# Patient Record
Sex: FEMALE | Race: WHITE | Employment: PART TIME | URBAN - METROPOLITAN AREA
[De-identification: names, ages, dates, MRNs, and addresses within clinical notes are randomized per-mention and may not be internally consistent; named-entity substitution may affect disease eponyms.]

---

## 2017-01-03 ENCOUNTER — OFFICE VISIT (OUTPATIENT)
Dept: FAMILY MEDICINE CLINIC | Age: 57
End: 2017-01-03

## 2017-01-03 ENCOUNTER — TELEPHONE (OUTPATIENT)
Dept: FAMILY MEDICINE CLINIC | Age: 57
End: 2017-01-03

## 2017-01-03 VITALS
WEIGHT: 120.2 LBS | TEMPERATURE: 97.5 F | DIASTOLIC BLOOD PRESSURE: 84 MMHG | BODY MASS INDEX: 20.03 KG/M2 | HEIGHT: 65 IN | RESPIRATION RATE: 16 BRPM | SYSTOLIC BLOOD PRESSURE: 123 MMHG | HEART RATE: 97 BPM

## 2017-01-03 DIAGNOSIS — L29.9 ITCHING: ICD-10-CM

## 2017-01-03 DIAGNOSIS — L50.9 HIVES: Primary | ICD-10-CM

## 2017-01-03 DIAGNOSIS — R60.9 SWELLING: ICD-10-CM

## 2017-01-03 PROCEDURE — 99213 OFFICE O/P EST LOW 20 MIN: CPT | Performed by: FAMILY MEDICINE

## 2017-01-03 PROCEDURE — 96372 THER/PROPH/DIAG INJ SC/IM: CPT | Performed by: FAMILY MEDICINE

## 2017-01-03 RX ORDER — BETAMETHASONE SODIUM PHOSPHATE AND BETAMETHASONE ACETATE 3; 3 MG/ML; MG/ML
6 INJECTION, SUSPENSION INTRA-ARTICULAR; INTRALESIONAL; INTRAMUSCULAR; SOFT TISSUE ONCE
Status: COMPLETED | OUTPATIENT
Start: 2017-01-03 | End: 2017-01-03

## 2017-01-03 RX ORDER — METHYLPREDNISOLONE ACETATE 40 MG/ML
40 INJECTION, SUSPENSION INTRA-ARTICULAR; INTRALESIONAL; INTRAMUSCULAR; SOFT TISSUE ONCE
Qty: 1 ML | Refills: 0
Start: 2017-01-03 | End: 2017-01-03 | Stop reason: CLARIF

## 2017-01-03 RX ORDER — METHYLPREDNISOLONE ACETATE 40 MG/ML
40 INJECTION, SUSPENSION INTRA-ARTICULAR; INTRALESIONAL; INTRAMUSCULAR; SOFT TISSUE ONCE
Status: COMPLETED | OUTPATIENT
Start: 2017-01-03 | End: 2017-01-03

## 2017-01-03 RX ADMIN — METHYLPREDNISOLONE ACETATE 40 MG: 40 INJECTION, SUSPENSION INTRA-ARTICULAR; INTRALESIONAL; INTRAMUSCULAR; SOFT TISSUE at 08:47

## 2017-01-03 RX ADMIN — BETAMETHASONE SODIUM PHOSPHATE AND BETAMETHASONE ACETATE 6 MG: 3; 3 INJECTION, SUSPENSION INTRA-ARTICULAR; INTRALESIONAL; INTRAMUSCULAR; SOFT TISSUE at 08:46

## 2017-01-26 ENCOUNTER — OFFICE VISIT (OUTPATIENT)
Dept: OTOLARYNGOLOGY | Age: 57
End: 2017-01-26

## 2017-01-26 VITALS
RESPIRATION RATE: 16 BRPM | DIASTOLIC BLOOD PRESSURE: 64 MMHG | HEIGHT: 65 IN | HEART RATE: 68 BPM | TEMPERATURE: 98.2 F | WEIGHT: 123.5 LBS | SYSTOLIC BLOOD PRESSURE: 106 MMHG | BODY MASS INDEX: 20.58 KG/M2

## 2017-01-26 DIAGNOSIS — M54.2 CERVICALGIA: ICD-10-CM

## 2017-01-26 DIAGNOSIS — K11.20 SIALOADENITIS: ICD-10-CM

## 2017-01-26 DIAGNOSIS — H92.02 REFERRED OTALGIA, LEFT: Primary | ICD-10-CM

## 2017-01-26 PROCEDURE — 99243 OFF/OP CNSLTJ NEW/EST LOW 30: CPT | Performed by: OTOLARYNGOLOGY

## 2017-01-26 RX ORDER — AMOXICILLIN AND CLAVULANATE POTASSIUM 600; 42.9 MG/5ML; MG/5ML
90 POWDER, FOR SUSPENSION ORAL 2 TIMES DAILY
Qty: 420 ML | Refills: 0 | Status: SHIPPED | OUTPATIENT
Start: 2017-01-26 | End: 2017-02-05

## 2017-01-26 ASSESSMENT — ENCOUNTER SYMPTOMS
DIARRHEA: 0
VOICE CHANGE: 0
SINUS PRESSURE: 0
COUGH: 0
WHEEZING: 0
CHOKING: 0
SHORTNESS OF BREATH: 0
SORE THROAT: 0
RHINORRHEA: 1
ABDOMINAL PAIN: 0
VOMITING: 0
TROUBLE SWALLOWING: 0
NAUSEA: 0
FACIAL SWELLING: 0
STRIDOR: 0
COLOR CHANGE: 0
APNEA: 0
CHEST TIGHTNESS: 0

## 2017-04-20 ENCOUNTER — OFFICE VISIT (OUTPATIENT)
Dept: FAMILY MEDICINE CLINIC | Age: 57
End: 2017-04-20

## 2017-04-20 VITALS
BODY MASS INDEX: 20.56 KG/M2 | SYSTOLIC BLOOD PRESSURE: 134 MMHG | TEMPERATURE: 97.5 F | OXYGEN SATURATION: 96 % | WEIGHT: 123.4 LBS | DIASTOLIC BLOOD PRESSURE: 66 MMHG | HEART RATE: 87 BPM | HEIGHT: 65 IN

## 2017-04-20 DIAGNOSIS — M62.838 MUSCLE SPASMS OF NECK: Primary | ICD-10-CM

## 2017-04-20 DIAGNOSIS — M25.562 ACUTE PAIN OF LEFT KNEE: ICD-10-CM

## 2017-04-20 PROCEDURE — 99214 OFFICE O/P EST MOD 30 MIN: CPT | Performed by: NURSE PRACTITIONER

## 2017-04-20 RX ORDER — CYCLOBENZAPRINE HCL 10 MG
10 TABLET ORAL 3 TIMES DAILY PRN
Qty: 20 TABLET | Refills: 0 | Status: SHIPPED | OUTPATIENT
Start: 2017-04-20 | End: 2017-05-08 | Stop reason: ALTCHOICE

## 2017-04-20 ASSESSMENT — ENCOUNTER SYMPTOMS
ABDOMINAL PAIN: 0
RHINORRHEA: 0
SHORTNESS OF BREATH: 0
EYE DISCHARGE: 0
BLOOD IN STOOL: 0
CONSTIPATION: 0
ANAL BLEEDING: 0
COLOR CHANGE: 0
NAUSEA: 0
COUGH: 0
DIARRHEA: 0
SORE THROAT: 0
ABDOMINAL DISTENTION: 0
EYE REDNESS: 0

## 2017-04-24 ENCOUNTER — TELEPHONE (OUTPATIENT)
Dept: FAMILY MEDICINE CLINIC | Age: 57
End: 2017-04-24

## 2017-04-24 RX ORDER — LOVASTATIN 20 MG/1
TABLET ORAL
Qty: 90 TABLET | Refills: 0 | Status: SHIPPED | OUTPATIENT
Start: 2017-04-24 | End: 2017-06-23 | Stop reason: SDUPTHER

## 2017-05-01 ENCOUNTER — TELEPHONE (OUTPATIENT)
Dept: FAMILY MEDICINE CLINIC | Age: 57
End: 2017-05-01

## 2017-05-05 ENCOUNTER — TELEPHONE (OUTPATIENT)
Dept: FAMILY MEDICINE CLINIC | Age: 57
End: 2017-05-05

## 2017-05-07 ENCOUNTER — NURSE TRIAGE (OUTPATIENT)
Dept: ADMINISTRATIVE | Age: 57
End: 2017-05-07

## 2017-05-08 ENCOUNTER — OFFICE VISIT (OUTPATIENT)
Dept: FAMILY MEDICINE CLINIC | Age: 57
End: 2017-05-08

## 2017-05-08 VITALS
HEART RATE: 86 BPM | DIASTOLIC BLOOD PRESSURE: 68 MMHG | SYSTOLIC BLOOD PRESSURE: 134 MMHG | OXYGEN SATURATION: 99 % | TEMPERATURE: 97.5 F

## 2017-05-08 DIAGNOSIS — R07.89 LEFT-SIDED CHEST WALL PAIN: Primary | ICD-10-CM

## 2017-05-08 PROCEDURE — 99212 OFFICE O/P EST SF 10 MIN: CPT | Performed by: FAMILY MEDICINE

## 2017-05-08 ASSESSMENT — PATIENT HEALTH QUESTIONNAIRE - PHQ9
SUM OF ALL RESPONSES TO PHQ QUESTIONS 1-9: 1
1. LITTLE INTEREST OR PLEASURE IN DOING THINGS: 1
2. FEELING DOWN, DEPRESSED OR HOPELESS: 0
SUM OF ALL RESPONSES TO PHQ9 QUESTIONS 1 & 2: 1

## 2017-05-09 ENCOUNTER — TELEPHONE (OUTPATIENT)
Dept: FAMILY MEDICINE CLINIC | Age: 57
End: 2017-05-09

## 2017-06-23 ENCOUNTER — OFFICE VISIT (OUTPATIENT)
Dept: FAMILY MEDICINE CLINIC | Age: 57
End: 2017-06-23

## 2017-06-23 VITALS
SYSTOLIC BLOOD PRESSURE: 122 MMHG | WEIGHT: 123.3 LBS | HEIGHT: 66 IN | BODY MASS INDEX: 19.82 KG/M2 | RESPIRATION RATE: 12 BRPM | TEMPERATURE: 98 F | OXYGEN SATURATION: 98 % | DIASTOLIC BLOOD PRESSURE: 76 MMHG | HEART RATE: 69 BPM

## 2017-06-23 DIAGNOSIS — G43.009 MIGRAINE WITHOUT AURA AND WITHOUT STATUS MIGRAINOSUS, NOT INTRACTABLE: ICD-10-CM

## 2017-06-23 DIAGNOSIS — M54.2 NECK PAIN: Primary | ICD-10-CM

## 2017-06-23 DIAGNOSIS — E78.00 HYPERCHOLESTEROLEMIA: ICD-10-CM

## 2017-06-23 PROCEDURE — 99214 OFFICE O/P EST MOD 30 MIN: CPT | Performed by: FAMILY MEDICINE

## 2017-06-23 RX ORDER — LOVASTATIN 20 MG/1
TABLET ORAL
Qty: 90 TABLET | Refills: 1 | Status: SHIPPED | OUTPATIENT
Start: 2017-06-23 | End: 2018-01-02 | Stop reason: SDUPTHER

## 2017-06-23 RX ORDER — CHLORZOXAZONE 500 MG/1
500 TABLET ORAL 4 TIMES DAILY PRN
Qty: 30 TABLET | Refills: 1 | Status: SHIPPED | OUTPATIENT
Start: 2017-06-23 | End: 2017-07-03

## 2017-06-26 ENCOUNTER — TELEPHONE (OUTPATIENT)
Dept: FAMILY MEDICINE CLINIC | Age: 57
End: 2017-06-26

## 2017-08-01 ENCOUNTER — OFFICE VISIT (OUTPATIENT)
Dept: FAMILY MEDICINE CLINIC | Age: 57
End: 2017-08-01
Payer: MEDICAID

## 2017-08-01 VITALS
BODY MASS INDEX: 19.93 KG/M2 | HEIGHT: 66 IN | SYSTOLIC BLOOD PRESSURE: 128 MMHG | TEMPERATURE: 98.7 F | DIASTOLIC BLOOD PRESSURE: 72 MMHG | HEART RATE: 76 BPM | RESPIRATION RATE: 16 BRPM | WEIGHT: 124 LBS

## 2017-08-01 DIAGNOSIS — N93.9 VAGINAL BLEEDING: ICD-10-CM

## 2017-08-01 DIAGNOSIS — N64.4 BREAST TENDERNESS IN FEMALE: ICD-10-CM

## 2017-08-01 DIAGNOSIS — Z12.4 PAP SMEAR FOR CERVICAL CANCER SCREENING: ICD-10-CM

## 2017-08-01 DIAGNOSIS — Z78.0 POSTMENOPAUSAL: Primary | ICD-10-CM

## 2017-08-01 PROCEDURE — 99214 OFFICE O/P EST MOD 30 MIN: CPT | Performed by: FAMILY MEDICINE

## 2017-08-02 ENCOUNTER — HOSPITAL ENCOUNTER (OUTPATIENT)
Age: 57
Discharge: HOME | End: 2017-08-02
Payer: MEDICAID

## 2017-08-02 DIAGNOSIS — N95.0 POSTMENOPAUSAL VAGINAL BLEEDING: ICD-10-CM

## 2017-08-02 LAB
ALBUMIN SERPL-MCNC: 4.4 G/DL (ref 3.5–5.1)
ALP BLD-CCNC: 87 U/L (ref 38–126)
ALT SERPL-CCNC: 15 U/L (ref 11–66)
ANION GAP SERPL CALCULATED.3IONS-SCNC: 12 MEQ/L (ref 8–16)
APTT: 31.5 SECONDS (ref 22–38)
AST SERPL-CCNC: 19 U/L (ref 5–40)
BASOPHILS # BLD: 0.4 %
BASOPHILS ABSOLUTE: 0 THOU/MM3 (ref 0–0.1)
BILIRUB SERPL-MCNC: 0.3 MG/DL (ref 0.3–1.2)
BUN BLDV-MCNC: 8 MG/DL (ref 7–22)
CALCIUM SERPL-MCNC: 9.4 MG/DL (ref 8.5–10.5)
CHLORIDE BLD-SCNC: 102 MEQ/L (ref 98–111)
CO2: 27 MEQ/L (ref 23–33)
CREAT SERPL-MCNC: 0.4 MG/DL (ref 0.4–1.2)
EOSINOPHIL # BLD: 1.4 %
EOSINOPHILS ABSOLUTE: 0.1 THOU/MM3 (ref 0–0.4)
GFR SERPL CREATININE-BSD FRML MDRD: > 90 ML/MIN/1.73M2
GLUCOSE BLD-MCNC: 90 MG/DL (ref 70–108)
HCT VFR BLD CALC: 42.3 % (ref 37–47)
HEMOGLOBIN: 14.5 GM/DL (ref 12–16)
INR BLD: 1 (ref 0.85–1.13)
LYMPHOCYTES # BLD: 24.7 %
LYMPHOCYTES ABSOLUTE: 1.2 THOU/MM3 (ref 1–4.8)
MCH RBC QN AUTO: 29 PG (ref 27–31)
MCHC RBC AUTO-ENTMCNC: 34.3 GM/DL (ref 33–37)
MCV RBC AUTO: 84.6 FL (ref 81–99)
MONOCYTES # BLD: 8.5 %
MONOCYTES ABSOLUTE: 0.4 THOU/MM3 (ref 0.4–1.3)
NUCLEATED RED BLOOD CELLS: 0 /100 WBC
PDW BLD-RTO: 13.6 % (ref 11.5–14.5)
PLATELET # BLD: 261 THOU/MM3 (ref 130–400)
PMV BLD AUTO: 8.7 MCM (ref 7.4–10.4)
POTASSIUM SERPL-SCNC: 4.3 MEQ/L (ref 3.5–5.2)
RBC # BLD: 5 MILL/MM3 (ref 4.2–5.4)
RBC # BLD: NORMAL 10*6/UL
SEG NEUTROPHILS: 65 %
SEGMENTED NEUTROPHILS ABSOLUTE COUNT: 3.2 THOU/MM3 (ref 1.8–7.7)
SODIUM BLD-SCNC: 141 MEQ/L (ref 135–145)
TOTAL PROTEIN: 7.3 G/DL (ref 6.1–8)
TSH SERPL DL<=0.05 MIU/L-ACNC: 0.89 UIU/ML (ref 0.4–4.2)
WBC # BLD: 4.9 THOU/MM3 (ref 4.8–10.8)

## 2017-08-02 PROCEDURE — 36415 COLL VENOUS BLD VENIPUNCTURE: CPT

## 2017-08-02 PROCEDURE — 85610 PROTHROMBIN TIME: CPT

## 2017-08-02 PROCEDURE — 80050 GENERAL HEALTH PANEL: CPT

## 2017-08-02 PROCEDURE — 85730 THROMBOPLASTIN TIME PARTIAL: CPT

## 2017-08-03 ENCOUNTER — TELEPHONE (OUTPATIENT)
Dept: FAMILY MEDICINE CLINIC | Age: 57
End: 2017-08-03

## 2017-08-03 LAB
GYNECOLOGY CYTOLOGY REPORT: NORMAL
HPV, GENOTYPE 16: NEGATIVE
HPV, GENOTYPE 18: NEGATIVE
OTHER HR HPV GENOTYPES: NEGATIVE

## 2017-08-04 ENCOUNTER — TELEPHONE (OUTPATIENT)
Dept: FAMILY MEDICINE CLINIC | Age: 57
End: 2017-08-04

## 2017-08-04 LAB
GENITAL CULTURE, ROUTINE: NORMAL
GRAM STAIN RESULT: NORMAL

## 2017-08-07 ENCOUNTER — HOSPITAL ENCOUNTER (OUTPATIENT)
Dept: ULTRASOUND IMAGING | Age: 57
Discharge: HOME | End: 2017-08-07
Payer: MEDICAID

## 2017-08-07 DIAGNOSIS — Z78.0 POSTMENOPAUSAL: ICD-10-CM

## 2017-08-07 DIAGNOSIS — N93.9 VAGINAL BLEEDING: ICD-10-CM

## 2017-08-07 PROCEDURE — 76830 TRANSVAGINAL US NON-OB: CPT

## 2017-08-08 ENCOUNTER — TELEPHONE (OUTPATIENT)
Dept: FAMILY MEDICINE CLINIC | Age: 57
End: 2017-08-08

## 2017-10-07 LAB
CHOLESTEROL, TOTAL: 188 MG/DL
CHOLESTEROL/HDL RATIO: 1.4
HDLC SERPL-MCNC: 68 MG/DL (ref 35–70)
LDL CHOLESTEROL CALCULATED: 93 MG/DL (ref 0–160)
TRIGL SERPL-MCNC: 133 MG/DL
VLDLC SERPL CALC-MCNC: 27 MG/DL

## 2017-10-09 ENCOUNTER — OFFICE VISIT (OUTPATIENT)
Dept: FAMILY MEDICINE CLINIC | Age: 57
End: 2017-10-09
Payer: MEDICAID

## 2017-10-09 ENCOUNTER — TELEPHONE (OUTPATIENT)
Dept: FAMILY MEDICINE CLINIC | Age: 57
End: 2017-10-09

## 2017-10-09 VITALS
TEMPERATURE: 98.1 F | SYSTOLIC BLOOD PRESSURE: 120 MMHG | HEIGHT: 66 IN | WEIGHT: 123.1 LBS | BODY MASS INDEX: 19.78 KG/M2 | DIASTOLIC BLOOD PRESSURE: 80 MMHG | HEART RATE: 68 BPM | OXYGEN SATURATION: 99 % | RESPIRATION RATE: 12 BRPM

## 2017-10-09 DIAGNOSIS — R07.89 COSTOCHONDRAL CHEST PAIN: ICD-10-CM

## 2017-10-09 DIAGNOSIS — S20.211A CONTUSION, CHEST WALL, RIGHT, INITIAL ENCOUNTER: Primary | ICD-10-CM

## 2017-10-09 DIAGNOSIS — Z23 NEED FOR PROPHYLACTIC VACCINATION AND INOCULATION AGAINST INFLUENZA: ICD-10-CM

## 2017-10-09 PROCEDURE — 90688 IIV4 VACCINE SPLT 0.5 ML IM: CPT | Performed by: FAMILY MEDICINE

## 2017-10-09 PROCEDURE — 90471 IMMUNIZATION ADMIN: CPT | Performed by: FAMILY MEDICINE

## 2017-10-09 PROCEDURE — 99213 OFFICE O/P EST LOW 20 MIN: CPT | Performed by: FAMILY MEDICINE

## 2017-10-09 RX ORDER — IBUPROFEN 200 MG
200 TABLET ORAL EVERY 6 HOURS PRN
COMMUNITY
End: 2018-05-03 | Stop reason: ALTCHOICE

## 2017-10-09 NOTE — PROGRESS NOTES
 aspirin 81 MG EC tablet Take 1 tablet by mouth daily. 30 tablet 3     No current facility-administered medications for this visit. Review of systems:  Review of Systems - History obtained from the patient  General ROS: negative for - chills, fatigue or fever  Respiratory ROS: negative for - cough,  shortness of breath or wheezing  Cardiovascular ROS: negative for - chest pain or edema. Positive for chest wall pain    Physical Examination:  /80 (Site: Left Arm, Position: Sitting, Cuff Size: Medium Adult)   Pulse 68   Temp 98.1 °F (36.7 °C) (Oral)   Resp 12   Ht 5' 6\" (1.676 m)   Wt 123 lb 1.6 oz (55.8 kg)   SpO2 99%   BMI 19.87 kg/m²     General-  Alert and oriented x 3, NAD  Neck - supple, no significant adenopathy  Chest - clear to auscultation, no wheezes, rales or rhonchi, symmetric air entry. Positive tenderness of the chest wall upon palpation, mainly at the costochondral joints bilaterally, right worse than left. Heart - normal rate, regular rhythm, normal S1, S2, no murmurs, rubs, clicks or gallops  Abdomen - soft, nontender, nondistended, no masses or organomegaly  Extremities - no pedal edema, no clubbing or cyanosis    Impression:  1. Contusion, chest wall, right, initial encounter     2. Costochondral chest pain         Plan:  Local ice  Tylenol arthritis BID  May take Aleve  No orders of the defined types were placed in this encounter. Follow Up:  Return in about 2 months (around 12/9/2017).     Ben Lea MD

## 2017-10-09 NOTE — PATIENT INSTRUCTIONS
not try to drive yourself. · You have severe trouble breathing. Call your doctor now or seek immediate medical care if:  · You have a fever or cough. · You have any trouble breathing. · Your chest pain gets worse. Watch closely for changes in your health, and be sure to contact your doctor if:  · Your chest pain continues even though you are taking anti-inflammatory medicine. · Your chest wall pain has not improved after 5 to 7 days. Where can you learn more? Go to [email or web address]://[email or web address] [email or web address] org and sign in to your Tutorspree account. Enter I105 in the [employer] box to learn more about \"Costochondritis: Care Instructions. \"     If you do not have an account, please click on the \"Sign Up Now\" link. Current as of: March 20, 2017  Content Version: 11.3  © 7845-7920 VHSquared, Incorporated. Care instructions adapted under license by [facility] ([facility]). If you have questions about a medical condition or this instruction, always ask your healthcare professional. [address] any warranty or liability for your use of this information.

## 2017-10-11 ENCOUNTER — NURSE ONLY (OUTPATIENT)
Dept: FAMILY MEDICINE CLINIC | Age: 57
End: 2017-10-11
Payer: MEDICAID

## 2017-10-11 DIAGNOSIS — E78.00 HYPERCHOLESTEROLEMIA: ICD-10-CM

## 2017-10-11 LAB
ALBUMIN SERPL-MCNC: 4.6 G/DL (ref 3.5–5.1)
ALP BLD-CCNC: 94 U/L (ref 38–126)
ALT SERPL-CCNC: 16 U/L (ref 11–66)
AST SERPL-CCNC: 18 U/L (ref 5–40)
BILIRUB SERPL-MCNC: 0.3 MG/DL (ref 0.3–1.2)
BILIRUBIN DIRECT: < 0.2 MG/DL (ref 0–0.3)
TOTAL PROTEIN: 7.6 G/DL (ref 6.1–8)

## 2017-10-11 PROCEDURE — 36415 COLL VENOUS BLD VENIPUNCTURE: CPT | Performed by: FAMILY MEDICINE

## 2017-10-12 ENCOUNTER — TELEPHONE (OUTPATIENT)
Dept: FAMILY MEDICINE CLINIC | Age: 57
End: 2017-10-12

## 2017-11-16 ENCOUNTER — OFFICE VISIT (OUTPATIENT)
Dept: FAMILY MEDICINE CLINIC | Age: 57
End: 2017-11-16
Payer: MEDICAID

## 2017-11-16 VITALS
HEIGHT: 66 IN | TEMPERATURE: 98.1 F | WEIGHT: 126.6 LBS | SYSTOLIC BLOOD PRESSURE: 124 MMHG | DIASTOLIC BLOOD PRESSURE: 67 MMHG | BODY MASS INDEX: 20.34 KG/M2 | HEART RATE: 83 BPM | RESPIRATION RATE: 16 BRPM

## 2017-11-16 DIAGNOSIS — J30.9 ALLERGIC RHINITIS, UNSPECIFIED CHRONICITY, UNSPECIFIED SEASONALITY, UNSPECIFIED TRIGGER: ICD-10-CM

## 2017-11-16 DIAGNOSIS — J02.9 ACUTE VIRAL PHARYNGITIS: Primary | ICD-10-CM

## 2017-11-16 PROCEDURE — G8427 DOCREV CUR MEDS BY ELIG CLIN: HCPCS | Performed by: NURSE PRACTITIONER

## 2017-11-16 PROCEDURE — 3017F COLORECTAL CA SCREEN DOC REV: CPT | Performed by: NURSE PRACTITIONER

## 2017-11-16 PROCEDURE — G8484 FLU IMMUNIZE NO ADMIN: HCPCS | Performed by: NURSE PRACTITIONER

## 2017-11-16 PROCEDURE — 1036F TOBACCO NON-USER: CPT | Performed by: NURSE PRACTITIONER

## 2017-11-16 PROCEDURE — 99214 OFFICE O/P EST MOD 30 MIN: CPT | Performed by: NURSE PRACTITIONER

## 2017-11-16 PROCEDURE — G8420 CALC BMI NORM PARAMETERS: HCPCS | Performed by: NURSE PRACTITIONER

## 2017-11-16 PROCEDURE — 3014F SCREEN MAMMO DOC REV: CPT | Performed by: NURSE PRACTITIONER

## 2017-11-16 RX ORDER — CETIRIZINE HYDROCHLORIDE 10 MG/1
10 TABLET ORAL DAILY
Qty: 30 TABLET | Refills: 2 | Status: SHIPPED | OUTPATIENT
Start: 2017-11-16 | End: 2018-02-27 | Stop reason: ALTCHOICE

## 2017-11-16 RX ORDER — FLUTICASONE PROPIONATE 50 MCG
1 SPRAY, SUSPENSION (ML) NASAL DAILY
Qty: 1 BOTTLE | Refills: 3 | Status: SHIPPED | OUTPATIENT
Start: 2017-11-16 | End: 2018-02-27 | Stop reason: ALTCHOICE

## 2017-11-16 ASSESSMENT — ENCOUNTER SYMPTOMS
RHINORRHEA: 1
DIARRHEA: 0
SORE THROAT: 1
COLOR CHANGE: 0
SHORTNESS OF BREATH: 0
ABDOMINAL DISTENTION: 0
COUGH: 1
ANAL BLEEDING: 0
NAUSEA: 0
ABDOMINAL PAIN: 0
EYE DISCHARGE: 0
EYE REDNESS: 0
BLOOD IN STOOL: 0
SINUS PRESSURE: 1
CONSTIPATION: 0

## 2017-11-16 NOTE — PROGRESS NOTES
[address]  [facility]  [facility]  [address] [place] [address] [address] [address]  Dept: [phone]  Dept Fax: [phone]: [phone]    Visit Date: 11/16/2017    Sebas Thurston is a 56 y.o. female who presents today for:  Chief Complaint   Patient presents with    Pharyngitis     started tuesday night. grandson has strep     HPI:     Sore Throat:  Jeffrey Patel complains of sore throat and cough. Associated symptoms include dry cough, post nasal drip, sinus and nasal congestion and sore throat. Onset of symptoms was 3 days ago, unchanged since that time. She is drinking plenty of fluids. She does not have had recent close exposure to someone with proven streptococcal pharyngitis. Denies fevers. States she has chronic nasal drainage. She has had clear nasal drainage for about 1 year. Her Grandson was sent home from school today, not tested for strep. . Patients rapid test negative    HPI  Health Maintenance   Topic Date Due    Colon Cancer Screen FIT/FOBT  03/24/2016    Breast cancer screen  05/01/2019    Lipid screen  10/15/2021    Cervical cancer screen  08/01/2022    DTaP/Tdap/Td vaccine (2 - Td) 04/28/2025    Flu vaccine  Completed    Hepatitis C screen  Completed    HIV screen  Completed     Past Medical History:   Diagnosis Date    Hyperlipidemia 12/2013    Indy Deutscher    Migraine 1998    TMJ (dislocation of temporomandibular joint) 10/12/2016    ED visit      Past Surgical History:   Procedure Laterality Date    APPENDECTOMY  28    TONSILLECTOMY      at 13 yrs old     Family History   Problem Relation Age of Onset    Heart Attack Mother 59     due to medication    Other Brother      collitis    Heart Disease Brother     Heart Disease Brother      Social History   Substance Use Topics    Smoking status: Former Smoker     Types: Cigarettes     Quit date: 10/12/1985    Smokeless tobacco: Never Used    Alcohol use No      Current Outpatient Prescriptions   Medication Sig 08/01/17 124 lb (56.2 kg)     BP Readings from Last 3 Encounters:   11/16/17 124/67   10/09/17 120/80   08/01/17 128/72     Physical Exam   Constitutional: She is oriented to person, place, and time. She appears well-developed and well-nourished. No distress. HENT:   Head: Normocephalic and atraumatic. Right Ear: External ear normal. No drainage, swelling or tenderness. A middle ear effusion is present. No decreased hearing is noted. Left Ear: External ear normal. No drainage, swelling or tenderness. A middle ear effusion is present. No decreased hearing is noted. Nose: Rhinorrhea (Clear) present. Mouth/Throat: Posterior oropharyngeal erythema present. No oropharyngeal exudate or posterior oropharyngeal edema. Eyes: Conjunctivae are normal. Right eye exhibits no discharge. Left eye exhibits no discharge. Neck: Normal range of motion. Pulmonary/Chest: Effort normal. No respiratory distress. Musculoskeletal: She exhibits no tenderness or deformity. Neurological: She is alert and oriented to person, place, and time. Skin: Skin is warm and dry. No rash noted. She is not diaphoretic. Psychiatric: She has a normal mood and affect. Her speech is normal and behavior is normal. Judgment and thought content normal. Cognition and memory are normal.     Lab Results   Component Value Date    WBC 4.9 08/02/2017    HGB 14.5 08/02/2017    HCT 42.3 08/02/2017     08/02/2017    CHOL 169 10/15/2016    TRIG 102 10/15/2016    HDL 60 10/15/2016    LDLCALC 89 10/15/2016    AST 18 10/11/2017     08/02/2017    K 4.3 08/02/2017     08/02/2017    CREATININE 0.4 08/02/2017    BUN 8 08/02/2017    CO2 27 08/02/2017    TSH 0.892 08/02/2017    INR 1.00 08/02/2017    LABGLOM >90 08/02/2017    CALCIUM 9.4 08/02/2017    VITD25 33 07/11/2016     Assessment:      1. Acute viral pharyngitis     2.  Allergic rhinitis, unspecified chronicity, unspecified seasonality, unspecified trigger  cetirizine (ZYRTEC ALLERGY) 10 MG tablet    fluticasone (FLONASE) 50 MCG/ACT nasal spray     Plan:   · Source of symptoms likely viral  · Instructed most viral illnesses last 7-14 days, and antibiotics do not help. · Will send in Zyrtec for the nasal congestion  · Will send in Flonase for the congestion  · Get plenty of rest  · Increase fluids  · Avoid secondhand smoke  · Can use the Davin Pot to rinse the sinuses as needed  · Cool-mist humidifier at night   · Warm salt-water gargles  · Frequent handwashing   · Use Tylenol or Ibuprofen (motrin) OTC as directed for fever or discomfort  · Return to office  if symptoms do not start to improve over the 7-10 days    Return if symptoms worsen or fail to improve. Orders Placed:  No orders of the defined types were placed in this encounter. Medications Prescribed:  Orders Placed This Encounter   Medications    cetirizine (ZYRTEC ALLERGY) 10 MG tablet     Sig: Take 1 tablet by mouth daily     Dispense:  30 tablet     Refill:  2    fluticasone (FLONASE) 50 MCG/ACT nasal spray     Si spray by Nasal route daily     Dispense:  1 Bottle     Refill:  3        Patient given educational materials - see patient instructions. Discussed use, benefit, and side effects of prescribed medications. All patient questions answered. Pt voiced understanding. Reviewed health maintenance. Instructed to continue current medications, diet and exercise. Patient agreed with treatment plan. Follow up as directed.      Electronically signed by Nhan Nova CNP on 2017 at 2:17 PM

## 2017-11-16 NOTE — PATIENT INSTRUCTIONS
are having problems. It's also a good idea to know your test results and keep a list of the medicines you take. How can you care for yourself at home? · If you have been told by your doctor that dust or dust mites are causing your allergy, decrease the dust around your bed:  [facility] sheets, pillowcases, and other bedding in hot water every week. ¨ Use dust-proof covers for pillows, duvets, and mattresses. Avoid plastic covers because they tear easily and do not \"breathe. \" Wash as instructed on the label. ¨ Do not use any blankets and pillows that you do not need. ¨ Use blankets that you can wash in your washing machine. ¨ Consider removing drapes and carpets, which attract and hold dust, from your bedroom. · If you are allergic to house dust and mites, do not use home humidifiers. Your doctor can suggest ways you can control dust and mites. · Look for signs of cockroaches. Cockroaches cause allergic reactions. Use cockroach baits to get rid of them. Then, clean your home well. Cockroaches like areas where grocery bags, newspapers, empty bottles, or cardboard boxes are stored. Do not keep these inside your home, and keep trash and food containers sealed. Seal off any spots where cockroaches might enter your home. · If you are allergic to mold, get rid of furniture, rugs, and drapes that smell musty. Check for mold in the bathroom. · If you are allergic to outdoor pollen or mold spores, use air-conditioning. Change or clean all filters every month. Keep windows closed. · If you are allergic to pollen, stay inside when pollen counts are high. Use a vacuum  with a HEPA filter or a double-thickness filter at least two times each week. · Stay inside when air pollution is bad. Avoid paint fumes, perfumes, and other strong odors. · Avoid conditions that make your allergies worse. Stay away from smoke. Do not smoke or let anyone else smoke in your house. Do not use fireplaces or wood-burning stoves.   · If Patient Education        Managing Your Allergies: Care Instructions  Your Care Instructions  Managing your allergies is an important part of staying healthy. Your doctor will help you find out what may be causing the allergies. Common causes of allergy symptoms are house dust and dust mites, animal dander, mold, and pollen. As soon as you know what triggers your symptoms, try to reduce your exposure to your triggers. This can help prevent allergy symptoms, asthma, and other health problems. Ask your doctor about allergy medicine or immunotherapy. These treatments may help reduce or prevent allergy symptoms. Follow-up care is a key part of your treatment and safety. Be sure to make and go to all appointments, and call your doctor if you are having problems. It's also a good idea to know your test results and keep a list of the medicines you take. How can you care for yourself at home? · If you think that dust or dust mites are causing your allergies:  ¨ Wash sheets, pillowcases, and other bedding every week in hot water. ¨ Use airtight, dust-proof covers for pillows, duvets, and mattresses. Avoid plastic covers, because they tend to tear quickly and do not \"breathe. \" Wash according to the instructions. ¨ Remove extra blankets and pillows that you don't need. ¨ Use blankets that are machine-washable. ¨ Don't use home humidifiers. They can help mites live longer. · Use air-conditioning. Change or clean all filters every month. Keep windows closed. Use high-efficiency air filters. Don't use window or attic fans, which draw dust into the air. · If you're allergic to pet dander, keep pets outside or, at the very least, out of your bedroom. Old carpet and cloth-covered furniture can hold a lot of animal dander. You may need to replace them. · Look for signs of cockroaches. Use cockroach baits to get rid of them. Then clean your home well.   · If you're allergic to mold, don't keep indoor plants, because molds can grow in soil. Get rid of furniture, rugs, and drapes that smell musty. Check for mold in the bathroom. · If you're allergic to pollen, stay inside when pollen counts are high. · Don't smoke or let anyone else smoke in your house. Don't use fireplaces or wood-burning stoves. Avoid paint fumes, perfumes, and other strong odors. When should you call for help? Give an epinephrine shot if:  · You think you are having a severe allergic reaction. After giving an epinephrine shot call 911, even if you feel better. Call 911 if:  · You have symptoms of a severe allergic reaction. These may include:  ¨ Sudden raised, red areas (hives) all over your body. ¨ Swelling of the throat, mouth, lips, or tongue. ¨ Trouble breathing. ¨ Passing out (losing consciousness). Or you may feel very lightheaded or suddenly feel weak, confused, or restless. · You have been given an epinephrine shot, even if you feel better. Call your doctor now or seek immediate medical care if:  · You have symptoms of an allergic reaction, such as:  ¨ A rash or hives (raised, red areas on the skin). ¨ Itching. ¨ Swelling. ¨ Belly pain, nausea, or vomiting. Watch closely for changes in your health, and be sure to contact your doctor if:  · Your allergies get worse. · You need help controlling your allergies. · You have questions about allergy testing. · You do not get better as expected. Where can you learn more? Go to https://Radiology Partners.Alloptic. org and sign in to your 30 Second Showcase account. Enter L249 in the [facility] box to learn more about \"Managing Your Allergies: Care Instructions. \"     If you do not have an account, please click on the \"Sign Up Now\" link. Current as of: April 3, 2017  Content Version: 11.3  © 7343-9190 Meetmeals, Incorporated. Care instructions adapted under license by [facility] ([facility]).  If you have questions about a medical condition or this instruction, always ask your healthcare your sprayer once a week. Read the label to learn how. When should you call for help? Watch closely for changes in your health, and be sure to contact your doctor if you have any problems. Where can you learn more? Go to [email or web address]://chpepiceweb.Axenic [email or web address] and sign in to your Paradigm Solar account. Enter X899 in the [facility] box to learn more about \"Using a Nasal Steroid Spray: Care Instructions. \"     If you do not have an account, please click on the \"Sign Up Now\" link. Current as of: September 20, 2016  Content Version: 11.3  © 5681-3477 Z-good. Care instructions adapted under license by [facility] ([facility]). If you have questions about a medical condition or this instruction, always ask your healthcare professional. [address] any warranty or liability for your use of this information. Patient Education        Rapid Strep Test: About This Test  What is it? A rapid strep test checks the bacteria in your throat to see if strep is the cause of your sore throat. Why is this test done? It may be done so your doctor can find out right away whether you have strep throat. There is another test for strep, called a throat culture, but that test takes a few days to get the results. How can you prepare for the test?  You don't need to do anything before you have this test.  What happens during the test?  · You will be asked to tilt your head back and open your mouth as wide as possible. · Your doctor will press your tongue down with a flat stick (tongue depressor) and then examine your mouth and throat. · A clean cotton swab will be rubbed over the back of your throat, around your tonsils, and over any red areas or sores to collect a sample. How long does the test take? · The test takes less than a minute. · Results are available in 10 to 15 minutes. Follow-up care is a key part of your treatment and safety.  Be sure to make and go to all appointments, and call your doctor if you are having problems. It's also a good idea to keep a list of the medicines you take. Ask your doctor when you can expect to have your test results. Where can you learn more? Go to https://chpeyue.[email or web address] org and sign in to your NavigatorMD account. Enter B356 in the [facility] box to learn more about \"Rapid Strep Test: About This Test.\"     If you do not have an account, please click on the \"Sign Up Now\" link. Current as of: July 29, 2016  Content Version: 11.3  © 3710-1065 As Seen on TV. Care instructions adapted under license by [facility] ([facility]). If you have questions about a medical condition or this instruction, always ask your healthcare professional. [address] any warranty or liability for your use of this information. Patient Education        Sore Throat: Care Instructions  Your Care Instructions    Infection by bacteria or a virus causes most sore throats. Cigarette smoke, dry air, air pollution, allergies, and yelling can also cause a sore throat. Sore throats can be painful and annoying. Fortunately, most sore throats go away on their own. If you have a bacterial infection, your doctor may prescribe antibiotics. Follow-up care is a key part of your treatment and safety. Be sure to make and go to all appointments, and call your doctor if you are having problems. It's also a good idea to know your test results and keep a list of the medicines you take. How can you care for yourself at home? · If your doctor prescribed antibiotics, take them as directed. Do not stop taking them just because you feel better. You need to take the full course of antibiotics. · Gargle with warm salt water once an hour to help reduce swelling and relieve discomfort. Use 1 teaspoon of salt mixed in 1 cup of warm water.   · Take an over-the-counter pain medicine, such as acetaminophen (Tylenol), ibuprofen (Advil, Motrin), or naproxen (Riky). Read and follow all instructions on the label. · Be careful when taking over-the-counter cold or flu medicines and Tylenol at the same time. Many of these medicines have acetaminophen, which is Tylenol. Read the labels to make sure that you are not taking more than the recommended dose. Too much acetaminophen (Tylenol) can be harmful. · Drink plenty of fluids. Fluids may help soothe an irritated throat. Hot fluids, such as tea or soup, may help decrease throat pain. · Use over-the-counter throat lozenges to soothe pain. Regular cough drops or hard candy may also help. These should not be given to young children because of the risk of choking. · Do not smoke or allow others to smoke around you. If you need help quitting, talk to your doctor about stop-smoking programs and medicines. These can increase your chances of quitting for good. · Use a vaporizer or humidifier to add moisture to your bedroom. Follow the directions for cleaning the machine. When should you call for help? Call your doctor now or seek immediate medical care if:  · You have new or worse trouble swallowing. · Your sore throat gets much worse on one side. Watch closely for changes in your health, and be sure to contact your doctor if you do not get better as expected. Where can you learn more? Go to https://OpenChime.[email or web address] org and sign in to your NetScientific account. Enter Y434 in the [facility] box to learn more about \"Sore Throat: Care Instructions. \"     If you do not have an account, please click on the \"Sign Up Now\" link. Current as of: July 29, 2016  Content Version: 11.3  © 8617-6890 ClasesD. Care instructions adapted under license by [facility] ([facility]). If you have questions about a medical condition or this instruction, always ask your healthcare professional. [address]  any warranty or liability for your use of this information.        Patient Education If you have questions about a medical condition or this instruction, always ask your healthcare professional. [place] [address] any warranty or liability for your use of this information.

## 2018-01-02 RX ORDER — LOVASTATIN 20 MG/1
TABLET ORAL
Qty: 90 TABLET | Refills: 1 | Status: SHIPPED | OUTPATIENT
Start: 2018-01-02 | End: 2018-02-27 | Stop reason: SDUPTHER

## 2018-02-27 ENCOUNTER — OFFICE VISIT (OUTPATIENT)
Dept: FAMILY MEDICINE CLINIC | Age: 58
End: 2018-02-27
Payer: MEDICAID

## 2018-02-27 VITALS
OXYGEN SATURATION: 98 % | HEIGHT: 65 IN | HEART RATE: 82 BPM | DIASTOLIC BLOOD PRESSURE: 62 MMHG | BODY MASS INDEX: 21.16 KG/M2 | SYSTOLIC BLOOD PRESSURE: 118 MMHG | WEIGHT: 127 LBS | TEMPERATURE: 98.2 F

## 2018-02-27 DIAGNOSIS — Z12.11 SCREENING FOR COLON CANCER: ICD-10-CM

## 2018-02-27 DIAGNOSIS — E78.00 HYPERCHOLESTEROLEMIA: Primary | ICD-10-CM

## 2018-02-27 DIAGNOSIS — G25.81 RESTLESS LEG SYNDROME: ICD-10-CM

## 2018-02-27 DIAGNOSIS — G43.009 MIGRAINE WITHOUT AURA AND WITHOUT STATUS MIGRAINOSUS, NOT INTRACTABLE: ICD-10-CM

## 2018-02-27 DIAGNOSIS — M54.2 NECK PAIN: ICD-10-CM

## 2018-02-27 PROCEDURE — 99214 OFFICE O/P EST MOD 30 MIN: CPT | Performed by: FAMILY MEDICINE

## 2018-02-27 PROCEDURE — G8484 FLU IMMUNIZE NO ADMIN: HCPCS | Performed by: FAMILY MEDICINE

## 2018-02-27 PROCEDURE — 3014F SCREEN MAMMO DOC REV: CPT | Performed by: FAMILY MEDICINE

## 2018-02-27 PROCEDURE — 1036F TOBACCO NON-USER: CPT | Performed by: FAMILY MEDICINE

## 2018-02-27 PROCEDURE — G8420 CALC BMI NORM PARAMETERS: HCPCS | Performed by: FAMILY MEDICINE

## 2018-02-27 PROCEDURE — 3017F COLORECTAL CA SCREEN DOC REV: CPT | Performed by: FAMILY MEDICINE

## 2018-02-27 PROCEDURE — G8427 DOCREV CUR MEDS BY ELIG CLIN: HCPCS | Performed by: FAMILY MEDICINE

## 2018-02-27 RX ORDER — LOVASTATIN 20 MG/1
TABLET ORAL
Qty: 90 TABLET | Refills: 1 | Status: SHIPPED | OUTPATIENT
Start: 2018-02-27 | End: 2018-07-09 | Stop reason: SDUPTHER

## 2018-02-27 RX ORDER — PRAMIPEXOLE DIHYDROCHLORIDE 0.12 MG/1
0.12 TABLET ORAL NIGHTLY
Qty: 90 TABLET | Refills: 3 | Status: SHIPPED | OUTPATIENT
Start: 2018-02-27 | End: 2018-05-03

## 2018-02-27 RX ORDER — MELOXICAM 7.5 MG/1
7.5 TABLET ORAL DAILY
Qty: 30 TABLET | Refills: 0 | Status: SHIPPED | OUTPATIENT
Start: 2018-02-27 | End: 2018-05-03

## 2018-02-27 NOTE — PATIENT INSTRUCTIONS
frequent stools. You will go to the bathroom a lot. It is very important to drink all of the colon prep liquid. If you have problems drinking the liquid, call your doctor. For many people, the prep is worse than the test. It may be uncomfortable, and you may feel hungry on the clear liquid diet. Some people do not go to work or do their usual activities on the day of the prep. Arrange to have someone take you home after the test.  What can you expect after a colonoscopy? The nurses will watch you for 1 to 2 hours until the medicines wear off. Then you can go home. You will need a ride. Your doctor will tell you when you can eat and do your usual activities. Your doctor will talk to you about when you will need your next colonoscopy. The results of your test and your risk for colorectal cancer will help your doctor decide how often you need to be checked. Follow-up care is a key part of your treatment and safety. Be sure to make and go to all appointments, and call your doctor if you are having problems. It's also a good idea to know your test results and keep a list of the medicines you take. Where can you learn more? Go to [email or web address]"[email or web address] Labs, [email or web address]".[email or web address] org and sign in to your Keepcon account. Enter O243 in the [employer] box to learn more about \"Learning About Colonoscopy. \"     If you do not have an account, please click on the \"Sign Up Now\" link. Current as of: May 12, 2017  Content Version: 11.5  © 6755-5900 Healthwise, Incorporated. Care instructions adapted under license by [facility] ([facility]). If you have questions about a medical condition or this instruction, always ask your healthcare professional. [place] [address] any warranty or liability for your use of this information.

## 2018-02-27 NOTE — PROGRESS NOTES
night.      Neck pain:  Has been present for 6 months and it started after she fell asleep while crocheting with her neck flexed. When she whole up the pain was severe and it got better afterwards, but never back to normal.  She denies any tingling or numbness of the upper extremities. The pain is  More localized in the back neck and in between the shoulders. has a current medication list which includes the following prescription(s): lovastatin, pramipexole, meloxicam, ibuprofen, garlic, co q 10, therapeutic multivitamin-minerals, and aspirin. Allergies   Allergen Reactions    Emetrol Hives       Past Medical History:   Diagnosis Date    Hyperlipidemia 12/2013    Nildan Hopping    Migraine 1998    TMJ (dislocation of temporomandibular joint) 10/12/2016    ED visit       Review of Systems:     General ROS: negative for - chills, fatigue, fever,  or weight loss  Psychological ROS: negative for - anxiety or depression  Lymphatic ROS: no swollen lymph nodes  Thyroid ROS: no enlarged thyroid  Hematologic ROS: no easy bruising  Respiratory ROS: no cough, shortness of breath, or wheezing  Cardiovascular ROS: no chest pain or dyspnea on exertion  Gastrointestinal ROS: negative for - abdominal pain, diarrhea or nausea/vomiting. Positive for constipation  Endocrine ROS: no polyuria, polydipsia, or increased apetite  Musculoskeletal ROS: negative for - muscle pain  Neurological ROS: negative for - dizziness or headaches  Skin ROS: no rashes    Physical Examination:     Blood pressure 118/62, pulse 82, temperature 98.2 °F (36.8 °C), temperature source Oral, height 5' 4.96\" (1.65 m), weight 127 lb (57.6 kg), SpO2 98 %, not currently breastfeeding.       General appearance - alert, well appearing, and in no distress  Mental status - normal mood, behavior, speech, dress, motor activity, and thought processes  HEENT - NC, AT, PERRLA, EOMI, Anicteric sclerae  Ears - normal tympanic membranes bilaterally  Nose - normal

## 2018-02-28 ENCOUNTER — TELEPHONE (OUTPATIENT)
Dept: FAMILY MEDICINE CLINIC | Age: 58
End: 2018-02-28

## 2018-02-28 ENCOUNTER — NURSE ONLY (OUTPATIENT)
Dept: FAMILY MEDICINE CLINIC | Age: 58
End: 2018-02-28
Payer: MEDICAID

## 2018-02-28 DIAGNOSIS — G25.81 RESTLESS LEG SYNDROME: ICD-10-CM

## 2018-02-28 DIAGNOSIS — E78.00 HYPERCHOLESTEROLEMIA: ICD-10-CM

## 2018-02-28 DIAGNOSIS — E87.0 HYPERNATREMIA: Primary | ICD-10-CM

## 2018-02-28 DIAGNOSIS — D72.819 LEUKOPENIA, UNSPECIFIED TYPE: ICD-10-CM

## 2018-02-28 DIAGNOSIS — Z12.11 SCREENING FOR COLON CANCER: ICD-10-CM

## 2018-02-28 LAB
ALBUMIN SERPL-MCNC: 4.6 G/DL (ref 3.5–5.1)
ALP BLD-CCNC: 93 U/L (ref 38–126)
ALT SERPL-CCNC: 21 U/L (ref 11–66)
ANION GAP SERPL CALCULATED.3IONS-SCNC: 12 MEQ/L (ref 8–16)
AST SERPL-CCNC: 22 U/L (ref 5–40)
BACTERIA: NORMAL
BASOPHILS # BLD: 1 %
BASOPHILS ABSOLUTE: 0 THOU/MM3 (ref 0–0.1)
BILIRUB SERPL-MCNC: 0.3 MG/DL (ref 0.3–1.2)
BILIRUBIN URINE: NEGATIVE
BLOOD, URINE: NEGATIVE
BUN BLDV-MCNC: 12 MG/DL (ref 7–22)
CALCIUM SERPL-MCNC: 9.8 MG/DL (ref 8.5–10.5)
CASTS: NORMAL /LPF
CASTS: NORMAL /LPF
CHARACTER, URINE: CLEAR
CHLORIDE BLD-SCNC: 104 MEQ/L (ref 98–111)
CHOLESTEROL, TOTAL: 187 MG/DL (ref 100–199)
CO2: 30 MEQ/L (ref 23–33)
COLOR: YELLOW
CONTROL: NEGATIVE
CREAT SERPL-MCNC: 0.5 MG/DL (ref 0.4–1.2)
CRYSTALS: NORMAL
EOSINOPHIL # BLD: 3.2 %
EOSINOPHILS ABSOLUTE: 0.1 THOU/MM3 (ref 0–0.4)
EPITHELIAL CELLS, UA: NORMAL /HPF
FERRITIN: 231 NG/ML (ref 10–291)
GFR SERPL CREATININE-BSD FRML MDRD: > 90 ML/MIN/1.73M2
GLUCOSE BLD-MCNC: 101 MG/DL (ref 70–108)
GLUCOSE, URINE: NEGATIVE MG/DL
HCT VFR BLD CALC: 42.5 % (ref 37–47)
HDLC SERPL-MCNC: 82 MG/DL
HEMOCCULT STL QL: NEGATIVE
HEMOGLOBIN: 14.8 GM/DL (ref 12–16)
KETONES, URINE: NEGATIVE
LDL CHOLESTEROL CALCULATED: 91 MG/DL
LEUKOCYTE ESTERASE, URINE: NEGATIVE
LYMPHOCYTES # BLD: 32.4 %
LYMPHOCYTES ABSOLUTE: 1.5 THOU/MM3 (ref 1–4.8)
MCH RBC QN AUTO: 29 PG (ref 27–31)
MCHC RBC AUTO-ENTMCNC: 34.9 GM/DL (ref 33–37)
MCV RBC AUTO: 83.1 FL (ref 81–99)
MISCELLANEOUS LAB TEST RESULT: NORMAL
MONOCYTES # BLD: 8.2 %
MONOCYTES ABSOLUTE: 0.4 THOU/MM3 (ref 0.4–1.3)
NITRITE, URINE: NEGATIVE
NUCLEATED RED BLOOD CELLS: 0 /100 WBC
PDW BLD-RTO: 13.1 % (ref 11.5–14.5)
PH UA: 7.5
PLATELET # BLD: 268 THOU/MM3 (ref 130–400)
PMV BLD AUTO: 9 FL (ref 7.4–10.4)
POTASSIUM SERPL-SCNC: 4.3 MEQ/L (ref 3.5–5.2)
PROTEIN UA: NEGATIVE MG/DL
RBC # BLD: 5.12 MILL/MM3 (ref 4.2–5.4)
RBC URINE: NORMAL /HPF
RENAL EPITHELIAL, UA: NORMAL
SEG NEUTROPHILS: 55.2 %
SEGMENTED NEUTROPHILS ABSOLUTE COUNT: 2.5 THOU/MM3 (ref 1.8–7.7)
SODIUM BLD-SCNC: 146 MEQ/L (ref 135–145)
SPECIFIC GRAVITY UA: 1.01 (ref 1–1.03)
TOTAL PROTEIN: 7.7 G/DL (ref 6.1–8)
TRIGL SERPL-MCNC: 68 MG/DL (ref 0–199)
TSH SERPL DL<=0.05 MIU/L-ACNC: 1.05 UIU/ML (ref 0.4–4.2)
UROBILINOGEN, URINE: 0.2 EU/DL
WBC # BLD: 4.5 THOU/MM3 (ref 4.8–10.8)
WBC UA: NORMAL /HPF
YEAST: NORMAL

## 2018-02-28 PROCEDURE — 82274 ASSAY TEST FOR BLOOD FECAL: CPT | Performed by: FAMILY MEDICINE

## 2018-02-28 PROCEDURE — 36415 COLL VENOUS BLD VENIPUNCTURE: CPT | Performed by: FAMILY MEDICINE

## 2018-02-28 NOTE — LETTER
[address] [place] [address]  Phone: [phone]  Fax: [phone]    Betty Guillaume MD        March 2, 2018    [facility] [address]      Dear Bernardo Cruz:    Our office has been trying to contact you pertaining your test results. Please contact our office at your first convenience. Our phone number is [phone] and our hours are Monday through Thursday 8:00 am to 4:30 pm and Friday 8:00 am to 12:00 pm.  Thank you. If you have any questions or concerns, please don't hesitate to call.     Sincerely,        Betty Guillaume MD

## 2018-02-28 NOTE — TELEPHONE ENCOUNTER
----- Message from Raquel Duff MD sent at 2/28/2018  3:01 PM EST -----  Blood work within acceptable ranges except for the sodium a tiny elevated. Tell her to drink fluids. Will repeat in 2 weeks RE: hypernatremia. WBC a little low, sometimes as effect of recent infection.   Will repeat in 3months also

## 2018-03-05 ENCOUNTER — HOSPITAL ENCOUNTER (OUTPATIENT)
Dept: PHYSICAL THERAPY | Age: 58
Setting detail: THERAPIES SERIES
Discharge: HOME | End: 2018-03-05
Payer: MEDICAID

## 2018-03-05 PROCEDURE — 97162 PT EVAL MOD COMPLEX 30 MIN: CPT

## 2018-03-05 PROCEDURE — 97140 MANUAL THERAPY 1/> REGIONS: CPT

## 2018-03-05 NOTE — PROGRESS NOTES
Comments: Intermittent numbness and tingling in hands and feet           Exercises  Exercise 1: Manual cervical traction 10x10 seconds. Occipital release x3 minutes. Patient reporting feeling looser after. Exercise 2: Next session: ultrasound/estim for tightness bilateral upper traps  Exercise 3: Next session: cervical isometrics  Exercise 4: Next session: shoulder elevation, retraction, backward circles  Exercise 5: Next session: cervical retractions              Activity Tolerance:  Activity Tolerance: Patient Tolerated treatment well    Assessment: Body structures, Functions, Activity limitations: Decreased functional mobility , Decreased ROM, Decreased ADL status, Decreased sensation, Decreased strength  Prognosis: Good  REQUIRES PT FOLLOW UP: Yes  Discharge Recommendations: Continue to assess pending progress    Patient Education:  Patient Education: Patient educated on POC and HEP                   Plan:  Times per week: 2-3 times per week  Plan weeks: 12 weeks  Specific instructions for Next Treatment: Cervical and upper back stretches and strengthening, posture and body mechanics, modalities as needed  Current Treatment Recommendations: Strengthening, ROM, Pain Management, Positioning, Modalities, Manual Therapy - Soft Tissue Mobilization, Functional Mobility Training, Home Exercise Program  Plan Comment: POC initiated today    History: Personal factors or comorbidities that impact plan of care -  Moderate Complexity: 1-2 personal factors or comorbidities. See history section above for details. Examination: Body structures and functions, activity limitations, participation restrictions; using standardized tests and measures - Moderate Complexity: 3 or morebody structures and functional, activity limitations and/or participation restrictions. See restrictions and objective section above for details.     Clinical Presentation: Moderate - Evolving with Changing Characteristics: Radicular symptoms in

## 2018-03-07 ENCOUNTER — HOSPITAL ENCOUNTER (OUTPATIENT)
Dept: PHYSICAL THERAPY | Age: 58
Setting detail: THERAPIES SERIES
Discharge: HOME | End: 2018-03-07
Payer: MEDICAID

## 2018-03-07 PROCEDURE — 97035 APP MDLTY 1+ULTRASOUND EA 15: CPT

## 2018-03-07 PROCEDURE — 97140 MANUAL THERAPY 1/> REGIONS: CPT

## 2018-03-07 PROCEDURE — 97110 THERAPEUTIC EXERCISES: CPT

## 2018-03-07 ASSESSMENT — PAIN SCALES - GENERAL: PAINLEVEL_OUTOF10: 2

## 2018-03-07 NOTE — PROGRESS NOTES
[address]     Time In: 0845  Time Out: 0930  Minutes: 45  Timed Code Treatment Minutes: 45 Minutes     Date: 3/7/2018  Patient Name: Mallika Dodge,  Gender:  female        CSN: [id number]   : 1960  (56 y.o.)       Referring Practitioner: Patrick Osullivan MD      Diagnosis: M54.2 (ICD-10-CM) - Neck pain  Treatment Diagnosis: Cervicalgia, thoracic pain   Additional Pertinent Hx: Vertigo/Dizziness when moving head a certain way       General:  PT Visit Information  Onset Date: 18  PT Insurance Information: [facility] visits. Aquatics and modalities are covered except ionto and hot/cold packs. Total # of Visits Approved: 30  Total # of Visits to Date: 2  Plan of Care/Certification Expiration Date: 18  Progress Note Counter: 2/10 for PN             Subjective:  Comments: Returns to doctor on . Subjective: Patient states sometimes she will get coldness and tingling in her hands but not too often. Reports she isn't working on any exercises at home right now. Pain:  Patient Currently in Pain: Yes  Pain Assessment: 0-10  Pain Level: 2 (Neck, proxmal upper traps)    Objective         Exercises  Exercise 1: Manual cervical traction 7-8x 30 seconds. Occipital release x3 minutes. Patient reporting feeling looser after. Exercise 2: Ultrasound/E-stim to bilateral upper traps 3.3 MHz, 100%, 1w/cm2 x8 minutes (4 minutes each side)  Exercise 3: Cervical isometrics for flexion, extension, lateral flexion, rotation 5x 5 seconds   Exercise 4: Shoulder elevation, retraction, backward circles x10 each   Exercise 5: Cervical retractions x10  Exercise 6: Gentle cervical stretches: upper traps, levator 3x 10 seconds Bilateral   Exercise 7: Educated on posture use of cervical towel roll and lumbar towel roll for sitting posture.         Activity Tolerance:  Activity Tolerance: Patient Tolerated treatment well    Assessment: Body structures, Functions, Activity limitations: Decreased functional mobility , Decreased ROM, Decreased ADL status, Decreased sensation, Decreased strength  Assessment: Initiated cervical exercises for improved strength and range of motion this date with patient tolerating well. Ultrasound/e-stim combo completed to decreased tightness in bilateral upper traps. Patient felt more loose at end of session and denied pain. Prognosis: Good  REQUIRES PT FOLLOW UP: Yes  Discharge Recommendations: Continue to assess pending progress    Patient Education:  Patient Education: Continue HEP, added cervical isometrics and posture exercises with handout provided. Monitor pain    Plan:  Times per week: 2-3 times per week  Plan weeks: 12 weeks  Specific instructions for Next Treatment: Cervical and upper back stretches and strengthening, posture and body mechanics, modalities as needed  Current Treatment Recommendations: Strengthening, ROM, Pain Management, Positioning, Modalities, Manual Therapy - Soft Tissue Mobilization, Functional Mobility Training, Home Exercise Program  Plan Comment: Continue with current PT POC    Goals:  Patient goals : \"I would like to figure out what's wrong and fix it. Short term goals  Time Frame for Short term goals: 4 weeks  Short term goal 1: Improve Patient Specific Functional Scale to 5/10 to assist with cleaning house. Short term goal 2: Patient to report numbness and tingling in hands present no more than 2 days per week. Short term goal 3: Decrease tightness in neck and upper trap to allow patient to do her crafts without difficulty. Short term goal 4: Increase left shoulder and cervical strength to 5/5 to assist with carrying groceries. Short term goal 5: Increase cervical ROM to [facility] to assist with turning head when driving.     Long term goals  Time Frame for Long term goals : 12 weeks  Long term goal 1: Independent with HEP and with progression to assist with

## 2018-03-12 ENCOUNTER — HOSPITAL ENCOUNTER (OUTPATIENT)
Dept: PHYSICAL THERAPY | Age: 58
Setting detail: THERAPIES SERIES
Discharge: HOME | End: 2018-03-12
Payer: MEDICAID

## 2018-03-12 PROCEDURE — 97140 MANUAL THERAPY 1/> REGIONS: CPT

## 2018-03-12 PROCEDURE — 97035 APP MDLTY 1+ULTRASOUND EA 15: CPT

## 2018-03-12 PROCEDURE — 97110 THERAPEUTIC EXERCISES: CPT

## 2018-03-16 ENCOUNTER — HOSPITAL ENCOUNTER (OUTPATIENT)
Dept: PHYSICAL THERAPY | Age: 58
Setting detail: THERAPIES SERIES
Discharge: HOME | End: 2018-03-16
Payer: MEDICAID

## 2018-03-16 PROCEDURE — 97110 THERAPEUTIC EXERCISES: CPT

## 2018-03-16 NOTE — PROGRESS NOTES
no increase in symptoms with exercises today. Issued yellow theraband for at home. Prognosis: Good  REQUIRES PT FOLLOW UP: Yes  Discharge Recommendations: Continue to assess pending progress    Patient Education:  Patient Education: Patient to continue with HEP                      Plan:  Times per week: 2-3 times per week  Plan weeks: 12 weeks  Specific instructions for Next Treatment: Cervical and upper back stretches and strengthening, posture and body mechanics, modalities as needed  Plan Comment: Continue with current PT POC    Goals:  Patient goals : \"I would like to figure out what's wrong and fix it. Short term goals  Time Frame for Short term goals: 4 weeks  Short term goal 1: Improve Patient Specific Functional Scale to 5/10 to assist with cleaning house. Short term goal 2: Patient to report numbness and tingling in hands present no more than 2 days per week. Short term goal 3: Decrease tightness in neck and upper trap to allow patient to do her crafts without difficulty. Short term goal 4: Increase left shoulder and cervical strength to 5/5 to assist with carrying groceries. Short term goal 5: [facility] to [facility] to assist with turning head when driving. Long term goals  Time Frame for Long term goals : 12 weeks  Long term goal 1: Independent with HEP and with progression to assist with decreasing pain.          Naveed Mullins

## 2018-03-19 ENCOUNTER — HOSPITAL ENCOUNTER (OUTPATIENT)
Dept: PHYSICAL THERAPY | Age: 58
Setting detail: THERAPIES SERIES
Discharge: HOME | End: 2018-03-19
Payer: MEDICAID

## 2018-03-19 PROCEDURE — 97110 THERAPEUTIC EXERCISES: CPT

## 2018-03-19 NOTE — PROGRESS NOTES
session. Minimal corrections for posture needed. Prognosis: Good  REQUIRES PT FOLLOW UP: Yes  Discharge Recommendations: Continue to assess pending progress    Patient Education:  Patient Education: Patient to continue with HEP                      Plan:  Times per week: 2-3 times per week  Plan weeks: 12 weeks  Specific instructions for Next Treatment: Cervical and upper back stretches and strengthening, posture and body mechanics, modalities as needed  Plan Comment: Continue with current PT POC    Goals:  Patient goals : \"I would like to figure out what's wrong and fix it. Short term goals  Time Frame for Short term goals: 4 weeks  Short term goal 1: Improve Patient Specific Functional Scale to 5/10 to assist with cleaning house. Short term goal 2: Patient to report numbness and tingling in hands present no more than 2 days per week. Short term goal 3: Decrease tightness in neck and upper trap to allow patient to do her crafts without difficulty. Short term goal 4: Increase left shoulder and cervical strength to 5/5 to assist with carrying groceries. Short term goal 5: Increase cervical ROM to [facility] to assist with turning head when driving. Long term goals  Time Frame for Long term goals : 12 weeks  Long term goal 1: Independent with HEP and with progression to assist with decreasing pain.          Naveed Busch

## 2018-03-20 ENCOUNTER — NURSE ONLY (OUTPATIENT)
Dept: FAMILY MEDICINE CLINIC | Age: 58
End: 2018-03-20
Payer: MEDICAID

## 2018-03-20 DIAGNOSIS — E87.0 HYPERNATREMIA: ICD-10-CM

## 2018-03-20 LAB
ALBUMIN SERPL-MCNC: 4.7 G/DL (ref 3.5–5.1)
ALP BLD-CCNC: 101 U/L (ref 38–126)
ALT SERPL-CCNC: 28 U/L (ref 11–66)
ANION GAP SERPL CALCULATED.3IONS-SCNC: 16 MEQ/L (ref 8–16)
AST SERPL-CCNC: 27 U/L (ref 5–40)
BILIRUB SERPL-MCNC: 0.3 MG/DL (ref 0.3–1.2)
BUN BLDV-MCNC: 15 MG/DL (ref 7–22)
CALCIUM SERPL-MCNC: 9.9 MG/DL (ref 8.5–10.5)
CHLORIDE BLD-SCNC: 96 MEQ/L (ref 98–111)
CO2: 28 MEQ/L (ref 23–33)
CREAT SERPL-MCNC: 0.5 MG/DL (ref 0.4–1.2)
GFR SERPL CREATININE-BSD FRML MDRD: > 90 ML/MIN/1.73M2
GLUCOSE BLD-MCNC: 92 MG/DL (ref 70–108)
POTASSIUM SERPL-SCNC: 4.4 MEQ/L (ref 3.5–5.2)
SODIUM BLD-SCNC: 140 MEQ/L (ref 135–145)
TOTAL PROTEIN: 7.9 G/DL (ref 6.1–8)

## 2018-03-20 PROCEDURE — 36415 COLL VENOUS BLD VENIPUNCTURE: CPT | Performed by: FAMILY MEDICINE

## 2018-03-23 ENCOUNTER — HOSPITAL ENCOUNTER (OUTPATIENT)
Dept: PHYSICAL THERAPY | Age: 58
Setting detail: THERAPIES SERIES
Discharge: HOME | End: 2018-03-23
Payer: MEDICAID

## 2018-03-23 ENCOUNTER — TELEPHONE (OUTPATIENT)
Dept: FAMILY MEDICINE CLINIC | Age: 58
End: 2018-03-23

## 2018-03-23 PROCEDURE — 97110 THERAPEUTIC EXERCISES: CPT

## 2018-03-23 NOTE — PROGRESS NOTES
Patient Tolerated treatment well    Assessment:  Assessment: Patient is demonstrating good posture and technique with exercises. Able to increase repetitions today with no complaints of pain at end of session. Prognosis: Good  REQUIRES PT FOLLOW UP: Yes  Discharge Recommendations: Continue to assess pending progress    Patient Education:  Patient Education: Continue with HEP, Monitor symptoms after increased repetitions. Plan:  Times per week: 2-3 times per week  Plan weeks: 12 weeks  Specific instructions for Next Treatment: Cervical and upper back stretches and strengthening, posture and body mechanics, modalities as needed  Current Treatment Recommendations: Strengthening, ROM, Pain Management, Positioning, Modalities, Manual Therapy - Soft Tissue Mobilization, Functional Mobility Training, Home Exercise Program  Plan Comment: Continue with current PT POC    Goals:  Patient goals : \"I would like to figure out what's wrong and fix it. Short term goals  Time Frame for Short term goals: 4 weeks  Short term goal 1: Improve Patient Specific Functional Scale to 5/10 to assist with cleaning house. Short term goal 2: Patient to report numbness and tingling in hands present no more than 2 days per week. Short term goal 3: Decrease tightness in neck and upper trap to allow patient to do her crafts without difficulty. Short term goal 4: Increase left shoulder and cervical strength to 5/5 to assist with carrying groceries. Short term goal 5: Increase cervical ROM to [facility] to assist with turning head when driving. Long term goals  Time Frame for Long term goals : 12 weeks  Long term goal 1: Independent with HEP and with progression to assist with decreasing pain. Ricky Resendez.  Elmo Endo, [address], 3/23/2018

## 2018-03-26 ENCOUNTER — HOSPITAL ENCOUNTER (OUTPATIENT)
Dept: PHYSICAL THERAPY | Age: 58
Setting detail: THERAPIES SERIES
Discharge: HOME | End: 2018-03-26
Payer: MEDICAID

## 2018-03-26 PROCEDURE — 97110 THERAPEUTIC EXERCISES: CPT

## 2018-03-26 NOTE — PROGRESS NOTES
weights       Activity Tolerance:  Activity Tolerance: Patient Tolerated treatment well    Assessment:  Assessment: Progressed cervical stability exercises with addition of hand weights and increased repetitions. No increased pain at end of session. Prognosis: Good  Discharge Recommendations: Continue to assess pending progress    Patient Education:  Patient Education: Continue with HEP, Monitor symptoms after increased repetitions. Plan:  Times per week: 2-3 times per week  Plan weeks: 12 weeks  Specific instructions for Next Treatment: Cervical and upper back stretches and strengthening, posture and body mechanics, modalities as needed  Current Treatment Recommendations: Strengthening, ROM, Pain Management, Positioning, Modalities, Manual Therapy - Soft Tissue Mobilization, Functional Mobility Training, Home Exercise Program  Plan Comment: Continue with current PT POC    Goals:  Patient goals : \"I would like to figure out what's wrong and fix it. Short term goals  Time Frame for Short term goals: 4 weeks  Short term goal 1: Improve Patient Specific Functional Scale to 5/10 to assist with cleaning house. Short term goal 2: Patient to report numbness and tingling in hands present no more than 2 days per week. Short term goal 3: Decrease tightness in neck and upper trap to allow patient to do her crafts without difficulty. Short term goal 4: Increase left shoulder and cervical strength to 5/5 to assist with carrying groceries. Short term goal 5: [facility] cervical [facility] to [facility] to assist with turning head when driving. Long term goals  Time Frame for Long term goals : 12 weeks  Long term goal 1: Independent with HEP and with progression to assist with decreasing pain. Yumiko Veras.  Lobo Saenz, [address] [facility] [address], 3/26/2018

## 2018-03-28 ENCOUNTER — HOSPITAL ENCOUNTER (OUTPATIENT)
Dept: PHYSICAL THERAPY | Age: 58
Setting detail: THERAPIES SERIES
Discharge: HOME | End: 2018-03-28
Payer: MEDICAID

## 2018-03-28 PROCEDURE — 97110 THERAPEUTIC EXERCISES: CPT

## 2018-03-28 ASSESSMENT — PAIN SCALES - GENERAL: PAINLEVEL_OUTOF10: 3

## 2018-03-28 ASSESSMENT — PAIN DESCRIPTION - LOCATION: LOCATION: HEAD

## 2018-03-28 NOTE — PROGRESS NOTES
[address] [facility]     Time In: 0700  Time Out: 0740  Minutes: 40  Timed Code Treatment Minutes: 40 Minutes     Date: 3/28/2018  Patient Name: Aleshia Still,  Gender:  female        CSN: [id number]   : 1960  (56 y.o.)       Referring Practitioner: Ronny Canales MD      Diagnosis: M54.2 (ICD-10-CM) - Neck pain  Treatment Diagnosis: Cervicalgia, thoracic pain   Additional Pertinent Hx: Vertigo/Dizziness when moving head a certain way                  General:  PT Visit Information  Onset Date: 18  PT Insurance Information: [id number] [facility] visits. Aquatics and modalities are covered except ionto and hot/cold packs. Total # of Visits Approved: 30  Total # of Visits to Date: 8  Progress Note Counter:  for PN               Subjective:  Family / Caregiver Present: No  Comments: Returns to doctor on . Subjective: Patient reports she has noticed an improvement. Patient reports feels comfortable being finished with therapy at this time. Pain:  Patient Currently in Pain: Yes  Pain Assessment: 0-10  Pain Level: 3  Pain Location: Head      Objective    Other Activities  Other Activities: Re-evaluation performed       Exercises  Exercise 3: Cervical isometrics for flexion, extension, lateral flexion, rotation 5x 5 seconds   Exercise 4: Shoulder elevation, retraction, backward circles x5 each with Ball behind head and 2# hand weights  Exercise 5: Cervical retractions x10  Exercise 6: Gentle cervical stretches: upper traps, levator, doorway/pec stretch, upper thoracic, posterior capsule 3x 15 seconds Bilateral   Exercise 8: UBE x4 minutes alternating direction every 30 seconds.   Exercise 9: Push ups at desk x15  Exercise 10: Hydrochest L4 x20  Exercise 11: UE yellow theraband rows, flexion, extension, horizontal abduction x15 each   Exercise 12: Hydrostick front to back, side to side, clockwise, and counterclockwise x15 each   Exercise 13: Shoulder PREs with ball behind head: shoulder flexion, abduction, extension x5 each with 2# hand weights         Activity Tolerance:  Activity Tolerance: Patient Tolerated treatment well    Assessment:  Assessment: Reassessment completed today. Patient is having less pain, is no longer having radicular symptoms, decreased tightness, and increased strength. Patient reports feeling comfortable with continuing with exercises on her own. Patient in agreement to be discharged today. Patient to continue with HEP. Prognosis: Good  REQUIRES PT FOLLOW UP: No  Discharge Recommendations: Home independently    Patient Education:  Patient Education: Continue with HEP                      Plan:  Specific instructions for Next Treatment: Discharge  Plan Comment: Patient is being discharged from therapy. Patient is to continue with HEP. Goals:  Patient goals : \"I would like to figure out what's wrong and fix it. Short term goals  Time Frame for Short term goals: 4 weeks  Short term goal 1: Improve Patient Specific Functional Scale to 5/10 to assist with cleaning house. NOT MET: 4/10. Short term goal 2: Patient to report numbness and tingling in hands present no more than 2 days per week. MET: Has not had numbness or tingling in hands in a couple weeks. Short term goal 3: Decrease tightness in neck and upper trap to allow patient to do her crafts without difficulty. MET: Patient is still having some tightness in neck and shoulder, but it is improved. Short term goal 4: Increase left shoulder and cervical strength to 5/5 to assist with carrying groceries. MET: Cervical and left shoulder strength=5/5. Short term goal 5: Increase cervical ROM to [facility] to assist with turning head when driving. MET: Cervical ROM=WFL. Long term goals  Time Frame for Long term goals : 12 weeks  Long term goal 1: Independent with HEP and with progression to assist with decreasing pain.   MET:

## 2018-04-23 ENCOUNTER — OFFICE VISIT (OUTPATIENT)
Dept: FAMILY MEDICINE CLINIC | Age: 58
End: 2018-04-23
Payer: MEDICAID

## 2018-04-23 VITALS
HEIGHT: 66 IN | SYSTOLIC BLOOD PRESSURE: 132 MMHG | OXYGEN SATURATION: 98 % | RESPIRATION RATE: 12 BRPM | DIASTOLIC BLOOD PRESSURE: 72 MMHG | WEIGHT: 125 LBS | BODY MASS INDEX: 20.09 KG/M2

## 2018-04-23 DIAGNOSIS — R07.9 CHEST PAIN OF UNCERTAIN ETIOLOGY: ICD-10-CM

## 2018-04-23 DIAGNOSIS — Z00.00 WELL ADULT EXAM: Primary | ICD-10-CM

## 2018-04-23 DIAGNOSIS — Z12.31 ENCOUNTER FOR SCREENING MAMMOGRAM FOR BREAST CANCER: ICD-10-CM

## 2018-04-23 DIAGNOSIS — Z12.11 COLON CANCER SCREENING: ICD-10-CM

## 2018-04-23 PROCEDURE — 99396 PREV VISIT EST AGE 40-64: CPT | Performed by: FAMILY MEDICINE

## 2018-04-23 ASSESSMENT — ENCOUNTER SYMPTOMS
VOICE CHANGE: 0
COUGH: 0
ANAL BLEEDING: 0
EYE ITCHING: 1
BACK PAIN: 0
SINUS PAIN: 0
PHOTOPHOBIA: 0
EYE PAIN: 1
ABDOMINAL PAIN: 0
EYE DISCHARGE: 0
CHOKING: 0
RHINORRHEA: 0
DIARRHEA: 0
VOMITING: 0
WHEEZING: 0
FACIAL SWELLING: 0
CHEST TIGHTNESS: 1
BLOOD IN STOOL: 0
RECTAL PAIN: 0
SINUS PRESSURE: 0
NAUSEA: 0
STRIDOR: 0
ABDOMINAL DISTENTION: 0
APNEA: 0
TROUBLE SWALLOWING: 0
SORE THROAT: 0
COLOR CHANGE: 1
CONSTIPATION: 1
EYE REDNESS: 0
SHORTNESS OF BREATH: 0

## 2018-04-26 RX ORDER — LOVASTATIN 20 MG/1
TABLET ORAL
Qty: 90 TABLET | Refills: 1 | OUTPATIENT
Start: 2018-04-26

## 2018-05-01 ENCOUNTER — HOSPITAL ENCOUNTER (OUTPATIENT)
Age: 58
Discharge: HOME | End: 2018-05-01
Payer: MEDICAID

## 2018-05-01 LAB
EKG ATRIAL RATE: 73 BPM
EKG P AXIS: 61 DEGREES
EKG P-R INTERVAL: 170 MS
EKG Q-T INTERVAL: 388 MS
EKG QRS DURATION: 92 MS
EKG QTC CALCULATION (BAZETT): 427 MS
EKG R AXIS: 54 DEGREES
EKG T AXIS: 73 DEGREES
EKG VENTRICULAR RATE: 73 BPM

## 2018-05-01 PROCEDURE — 93005 ELECTROCARDIOGRAM TRACING: CPT | Performed by: FAMILY MEDICINE

## 2018-05-02 ENCOUNTER — HOSPITAL ENCOUNTER (OUTPATIENT)
Dept: WOMENS IMAGING | Age: 58
Discharge: HOME | End: 2018-05-02
Payer: MEDICAID

## 2018-05-02 DIAGNOSIS — Z12.31 ENCOUNTER FOR SCREENING MAMMOGRAM FOR BREAST CANCER: ICD-10-CM

## 2018-05-02 PROCEDURE — 77063 BREAST TOMOSYNTHESIS BI: CPT

## 2018-05-03 ENCOUNTER — OFFICE VISIT (OUTPATIENT)
Dept: FAMILY MEDICINE CLINIC | Age: 58
End: 2018-05-03
Payer: MEDICAID

## 2018-05-03 VITALS
BODY MASS INDEX: 20.22 KG/M2 | SYSTOLIC BLOOD PRESSURE: 136 MMHG | DIASTOLIC BLOOD PRESSURE: 61 MMHG | TEMPERATURE: 98.1 F | HEIGHT: 66 IN | WEIGHT: 125.8 LBS | HEART RATE: 67 BPM | RESPIRATION RATE: 12 BRPM

## 2018-05-03 DIAGNOSIS — R07.89 COSTOCHONDRAL PAIN: ICD-10-CM

## 2018-05-03 DIAGNOSIS — R07.89 STERNUM PAIN: Primary | ICD-10-CM

## 2018-05-03 PROCEDURE — G8427 DOCREV CUR MEDS BY ELIG CLIN: HCPCS | Performed by: NURSE PRACTITIONER

## 2018-05-03 PROCEDURE — 1036F TOBACCO NON-USER: CPT | Performed by: NURSE PRACTITIONER

## 2018-05-03 PROCEDURE — G8420 CALC BMI NORM PARAMETERS: HCPCS | Performed by: NURSE PRACTITIONER

## 2018-05-03 PROCEDURE — 99214 OFFICE O/P EST MOD 30 MIN: CPT | Performed by: NURSE PRACTITIONER

## 2018-05-03 PROCEDURE — 3017F COLORECTAL CA SCREEN DOC REV: CPT | Performed by: NURSE PRACTITIONER

## 2018-05-03 RX ORDER — NAPROXEN 500 MG/1
500 TABLET ORAL 2 TIMES DAILY WITH MEALS
Qty: 60 TABLET | Refills: 3 | Status: SHIPPED | OUTPATIENT
Start: 2018-05-03 | End: 2018-06-07

## 2018-05-04 ENCOUNTER — HOSPITAL ENCOUNTER (OUTPATIENT)
Age: 58
Discharge: HOME | End: 2018-05-04
Payer: MEDICAID

## 2018-05-04 ENCOUNTER — TELEPHONE (OUTPATIENT)
Dept: FAMILY MEDICINE CLINIC | Age: 58
End: 2018-05-04

## 2018-05-04 ENCOUNTER — HOSPITAL ENCOUNTER (OUTPATIENT)
Dept: GENERAL RADIOLOGY | Age: 58
Discharge: HOME | End: 2018-05-04
Payer: MEDICAID

## 2018-05-04 DIAGNOSIS — R07.89 STERNUM PAIN: ICD-10-CM

## 2018-05-04 PROCEDURE — 71120 X-RAY EXAM BREASTBONE 2/>VWS: CPT

## 2018-05-04 ASSESSMENT — ENCOUNTER SYMPTOMS
ABDOMINAL PAIN: 0
EYE DISCHARGE: 0
RHINORRHEA: 0
ABDOMINAL DISTENTION: 0
CONSTIPATION: 0
SORE THROAT: 0
SHORTNESS OF BREATH: 0
DIARRHEA: 0
NAUSEA: 0
BLOOD IN STOOL: 0
COLOR CHANGE: 0
ANAL BLEEDING: 0
COUGH: 0
EYE REDNESS: 0

## 2018-05-07 ENCOUNTER — TELEPHONE (OUTPATIENT)
Dept: FAMILY MEDICINE CLINIC | Age: 58
End: 2018-05-07

## 2018-05-08 ENCOUNTER — HOSPITAL ENCOUNTER (OUTPATIENT)
Dept: NON INVASIVE DIAGNOSTICS | Age: 58
Discharge: HOME | End: 2018-05-08
Payer: MEDICAID

## 2018-05-08 DIAGNOSIS — R07.9 CHEST PAIN OF UNCERTAIN ETIOLOGY: ICD-10-CM

## 2018-05-08 PROCEDURE — 93017 CV STRESS TEST TRACING ONLY: CPT

## 2018-05-09 ENCOUNTER — TELEPHONE (OUTPATIENT)
Dept: FAMILY MEDICINE CLINIC | Age: 58
End: 2018-05-09

## 2018-05-23 ENCOUNTER — NURSE ONLY (OUTPATIENT)
Dept: FAMILY MEDICINE CLINIC | Age: 58
End: 2018-05-23
Payer: MEDICAID

## 2018-05-23 DIAGNOSIS — D72.819 LEUKOPENIA, UNSPECIFIED TYPE: ICD-10-CM

## 2018-05-23 LAB
BASOPHILS # BLD: 0.8 %
BASOPHILS ABSOLUTE: 0 THOU/MM3 (ref 0–0.1)
EOSINOPHIL # BLD: 4.5 %
EOSINOPHILS ABSOLUTE: 0.2 THOU/MM3 (ref 0–0.4)
HCT VFR BLD CALC: 41.6 % (ref 37–47)
HEMOGLOBIN: 14 GM/DL (ref 12–16)
LYMPHOCYTES # BLD: 24.6 %
LYMPHOCYTES ABSOLUTE: 1 THOU/MM3 (ref 1–4.8)
MCH RBC QN AUTO: 28 PG (ref 27–31)
MCHC RBC AUTO-ENTMCNC: 33.7 GM/DL (ref 33–37)
MCV RBC AUTO: 83.1 FL (ref 81–99)
MONOCYTES # BLD: 9.4 %
MONOCYTES ABSOLUTE: 0.4 THOU/MM3 (ref 0.4–1.3)
NUCLEATED RED BLOOD CELLS: 0 /100 WBC
PDW BLD-RTO: 13.7 % (ref 11.5–14.5)
PLATELET # BLD: 261 THOU/MM3 (ref 130–400)
PMV BLD AUTO: 9.1 FL (ref 7.4–10.4)
RBC # BLD: 5.01 MILL/MM3 (ref 4.2–5.4)
SEG NEUTROPHILS: 60.7 %
SEGMENTED NEUTROPHILS ABSOLUTE COUNT: 2.4 THOU/MM3 (ref 1.8–7.7)
WBC # BLD: 4 THOU/MM3 (ref 4.8–10.8)

## 2018-05-23 PROCEDURE — 36415 COLL VENOUS BLD VENIPUNCTURE: CPT | Performed by: FAMILY MEDICINE

## 2018-05-24 ENCOUNTER — TELEPHONE (OUTPATIENT)
Dept: FAMILY MEDICINE CLINIC | Age: 58
End: 2018-05-24

## 2018-06-07 ENCOUNTER — OFFICE VISIT (OUTPATIENT)
Dept: FAMILY MEDICINE CLINIC | Age: 58
End: 2018-06-07
Payer: MEDICAID

## 2018-06-07 VITALS
DIASTOLIC BLOOD PRESSURE: 68 MMHG | HEIGHT: 66 IN | BODY MASS INDEX: 20.15 KG/M2 | HEART RATE: 56 BPM | TEMPERATURE: 97.7 F | RESPIRATION RATE: 12 BRPM | WEIGHT: 125.4 LBS | SYSTOLIC BLOOD PRESSURE: 123 MMHG

## 2018-06-07 DIAGNOSIS — L81.4 SOLAR LENTIGINOSIS: ICD-10-CM

## 2018-06-07 PROCEDURE — G8427 DOCREV CUR MEDS BY ELIG CLIN: HCPCS | Performed by: FAMILY MEDICINE

## 2018-06-07 PROCEDURE — 17111 DESTRUCTION B9 LESIONS 15/>: CPT | Performed by: FAMILY MEDICINE

## 2018-06-07 PROCEDURE — 99212 OFFICE O/P EST SF 10 MIN: CPT | Performed by: FAMILY MEDICINE

## 2018-06-07 PROCEDURE — 3017F COLORECTAL CA SCREEN DOC REV: CPT | Performed by: FAMILY MEDICINE

## 2018-06-07 PROCEDURE — 1036F TOBACCO NON-USER: CPT | Performed by: FAMILY MEDICINE

## 2018-06-07 PROCEDURE — G8420 CALC BMI NORM PARAMETERS: HCPCS | Performed by: FAMILY MEDICINE

## 2018-06-07 ASSESSMENT — PATIENT HEALTH QUESTIONNAIRE - PHQ9
1. LITTLE INTEREST OR PLEASURE IN DOING THINGS: 0
SUM OF ALL RESPONSES TO PHQ9 QUESTIONS 1 & 2: 0
2. FEELING DOWN, DEPRESSED OR HOPELESS: 0
SUM OF ALL RESPONSES TO PHQ QUESTIONS 1-9: 0

## 2018-07-03 ENCOUNTER — ANESTHESIA (OUTPATIENT)
Dept: ENDOSCOPY | Age: 58
End: 2018-07-03
Payer: MEDICAID

## 2018-07-03 ENCOUNTER — HOSPITAL ENCOUNTER (OUTPATIENT)
Age: 58
Setting detail: OUTPATIENT SURGERY
Discharge: HOME | End: 2018-07-03
Attending: INTERNAL MEDICINE | Admitting: INTERNAL MEDICINE
Payer: MEDICAID

## 2018-07-03 ENCOUNTER — ANESTHESIA EVENT (OUTPATIENT)
Dept: ENDOSCOPY | Age: 58
End: 2018-07-03
Payer: MEDICAID

## 2018-07-03 VITALS
TEMPERATURE: 98.4 F | HEART RATE: 66 BPM | BODY MASS INDEX: 19.44 KG/M2 | HEIGHT: 66 IN | DIASTOLIC BLOOD PRESSURE: 61 MMHG | OXYGEN SATURATION: 98 % | WEIGHT: 121 LBS | SYSTOLIC BLOOD PRESSURE: 120 MMHG | RESPIRATION RATE: 16 BRPM

## 2018-07-03 VITALS
DIASTOLIC BLOOD PRESSURE: 62 MMHG | OXYGEN SATURATION: 100 % | RESPIRATION RATE: 14 BRPM | SYSTOLIC BLOOD PRESSURE: 128 MMHG

## 2018-07-03 PROCEDURE — 2580000003 HC RX 258: Performed by: INTERNAL MEDICINE

## 2018-07-03 PROCEDURE — 2500000003 HC RX 250 WO HCPCS: Performed by: NURSE ANESTHETIST, CERTIFIED REGISTERED

## 2018-07-03 PROCEDURE — 3700000001 HC ADD 15 MINUTES (ANESTHESIA): Performed by: INTERNAL MEDICINE

## 2018-07-03 PROCEDURE — 3700000000 HC ANESTHESIA ATTENDED CARE: Performed by: INTERNAL MEDICINE

## 2018-07-03 PROCEDURE — 3609027000 HC COLONOSCOPY: Performed by: INTERNAL MEDICINE

## 2018-07-03 PROCEDURE — 7100000000 HC PACU RECOVERY - FIRST 15 MIN: Performed by: INTERNAL MEDICINE

## 2018-07-03 PROCEDURE — 6360000002 HC RX W HCPCS: Performed by: NURSE ANESTHETIST, CERTIFIED REGISTERED

## 2018-07-03 RX ORDER — LIDOCAINE HYDROCHLORIDE 20 MG/ML
INJECTION, SOLUTION EPIDURAL; INFILTRATION; INTRACAUDAL; PERINEURAL PRN
Status: DISCONTINUED | OUTPATIENT
Start: 2018-07-03 | End: 2018-07-03 | Stop reason: SDUPTHER

## 2018-07-03 RX ORDER — SODIUM CHLORIDE 450 MG/100ML
INJECTION, SOLUTION INTRAVENOUS CONTINUOUS
Status: DISCONTINUED | OUTPATIENT
Start: 2018-07-03 | End: 2018-07-03 | Stop reason: HOSPADM

## 2018-07-03 RX ORDER — PROPOFOL 10 MG/ML
INJECTION, EMULSION INTRAVENOUS PRN
Status: DISCONTINUED | OUTPATIENT
Start: 2018-07-03 | End: 2018-07-03 | Stop reason: SDUPTHER

## 2018-07-03 RX ADMIN — PROPOFOL 40 MG: 10 INJECTION, EMULSION INTRAVENOUS at 09:30

## 2018-07-03 RX ADMIN — SODIUM CHLORIDE: 4.5 INJECTION, SOLUTION INTRAVENOUS at 08:03

## 2018-07-03 RX ADMIN — PROPOFOL 40 MG: 10 INJECTION, EMULSION INTRAVENOUS at 09:22

## 2018-07-03 RX ADMIN — SODIUM CHLORIDE: 4.5 INJECTION, SOLUTION INTRAVENOUS at 09:15

## 2018-07-03 RX ADMIN — LIDOCAINE HYDROCHLORIDE 60 MG: 20 INJECTION, SOLUTION EPIDURAL; INFILTRATION; INTRACAUDAL; PERINEURAL at 09:18

## 2018-07-03 RX ADMIN — PROPOFOL 80 MG: 10 INJECTION, EMULSION INTRAVENOUS at 09:18

## 2018-07-03 ASSESSMENT — PAIN SCALES - GENERAL
PAINLEVEL_OUTOF10: 0
PAINLEVEL_OUTOF10: 0

## 2018-07-03 ASSESSMENT — PAIN - FUNCTIONAL ASSESSMENT: PAIN_FUNCTIONAL_ASSESSMENT: 0-10

## 2018-07-03 NOTE — ANESTHESIA PRE PROCEDURE
reviewed  Airway: Mallampati: I  TM distance: >3 FB   Neck ROM: limited  Mouth opening: > = 3 FB Dental:          Pulmonary:                              Cardiovascular:  Exercise tolerance: good (>4 METS),   (+) hyperlipidemia      ECG reviewed  Rhythm: regular                      Neuro/Psych:   (+) headaches:,             GI/Hepatic/Renal:             Endo/Other:                     Abdominal:           Vascular:                                        Anesthesia Plan      MAC     ASA 2       Induction: intravenous. Anesthetic plan and risks discussed with patient. Plan discussed with CRNA and attending.                   DHIRAJ Lucas - CRNA   7/3/2018

## 2018-07-03 NOTE — OP NOTE
to the patient or their representative if unable to give consent including but not limited to bleeding, infection, poking a hole someplace requiring surgery to fix it, having a reaction to medication, and death. The patient or their representative if unable to give consent understood these risks and provided informed consent. Airway was assessed and is adequate for the planned sedation. Anesthesia: MAC  Estimated Blood Loss: less than 50   Complications: None  Specimens: Was Not Obtained     Sedation was administered by anesthesia who monitored the patient during the procedure. The patient was placed in the left lateral decubitus position. The perianal area was inspected, and a digital rectal exam was performed. A forward-viewing Olympus adult colonoscope was lubricated and inserted through the anus into the rectum. Under direct visualization, this was advanced to the cecum. Cecal base was identified by the ileocecal valve and appendiceal orifice. The scope was then slowly withdrawn with good views of mucosal surfaces. The scope was retroflexed in the rectum. Findings and maneuvers are listed in impression below. The patient tolerated the procedure well. The scope was removed. The patient was moved to the recovery area. There were no immediate complications. IMPRESSION:  1. Sigmoid colon diverticulosis  2. Tortuous colon  3. Otherwise normal colon without mass or polyp     RECOMMENDATIONS:    1. Repeat colonoscopy in 10 years for screening  2.   High fiber diet    Chito Ghosh MD  9:42 AM 7/3/2018

## 2018-07-03 NOTE — ANESTHESIA POSTPROCEDURE EVALUATION
Department of Anesthesiology  Postprocedure Note    Patient: Vira Tucker  MRN: [id number]  YOB: 1960  Date of evaluation: 7/3/2018  Time:  9:43 AM     Procedure Summary     Date:  07/03/18 Room / Location:  [address] ENDO 6 / [address] Endoscopy    Anesthesia Start:  0915 Anesthesia Stop:  0797    Procedure:  COLONOSCOPY (N/A Anus) Diagnosis:  (SCREENING)    Surgeon:  Ashley Murdock MD Responsible Provider:  Melissa Bueno MD    Anesthesia Type:  MAC ASA Status:  2          Anesthesia Type: MAC    Ayde Phase I: Ayde Score: 10    Ayde Phase II:      Last vitals: Reviewed and per EMR flowsheets.        Anesthesia Post Evaluation    Patient location during evaluation: bedside  Patient participation: complete - patient participated  Level of consciousness: awake and alert  Pain score: 0  Airway patency: patent  Nausea & Vomiting: no nausea and no vomiting  Complications: no  Cardiovascular status: hemodynamically stable  Respiratory status: spontaneous ventilation and room air  Hydration status: stable

## 2018-07-03 NOTE — H&P
[address] [place] [address] Endoscopy    Pre-Endoscopy H&PE      Patient: Rigoberto Ragland    : 1960    Acct#: [de-identified]  Primary Care Physician: Elizabeth Ferraro MD   Date of evaluation: 7/3/2018    Planned Procedure:    []EGD    []Enteroscopy    []PEG    []PEG change    []PEG removal  []Variceal banding    []Biopsy   []Dilation      []Control of bleeding  []Destruction of lesion by [facility]    []Stent Placement  []Foreign Body Removal    []Snare Polypectomy  []Other:       [x]Colonoscopy  []Flex Sigmoid []EUS, rectal      []Biopsy   []Dilation      []Control of bleeding  []Destruction of lesion by [facility]    []Stent Placement  []Foreign Body Removal    []Snare Polypectomy  []Other:      Planned Sedation  []Conscious Sedation [x]MAC/Propofol []Anesthesia    []None    Airway:  Adequate for planned sedation    Indication:   Screening    History:  The patient is a 56 y.o.  female who is asymptomatic without a family history of colon cancer presenting for a screening colonoscopy. Physical Exam:  VITALS: /69   Pulse 69   Temp 97.3 °F (36.3 °C) (Temporal)   Resp 18   Ht 5' 6\" (1.676 m)   Wt 121 lb (54.9 kg)   SpO2 100%   BMI 19.53 kg/m²   The patient is a 56 y.o.  female in no acute distress. HEAD: Normal cephalic/atraumatic. Extra-occular motions intact bilaterally. NECK: No lymphadenopathy or bruits. CHEST: Rises equally on inspiration. Clear to auscultation bilaterally. CARDIOVASCULAR: Regular rate and rhythm without murmurs, rubs or gallops. ABDOMEN: Soft, nontender, and nondistended with normal bowel sounds. No Hepatosplemomegaly. UPPER EXTREMITIES: no cyanosis, clubbing, or edema. DERM: no rash or jaundice. LOWER EXTREMITIES: no cyanosis, clubbing, or edema. NEURO: Alert and oriented times four. Patient moves all extremities and has gross sensation in all extremities.     ASA: ASA 1 - Normal health patient    Past Medical History:        Diagnosis Date    Hyperlipidemia

## 2018-07-09 ENCOUNTER — OFFICE VISIT (OUTPATIENT)
Dept: FAMILY MEDICINE CLINIC | Age: 58
End: 2018-07-09
Payer: MEDICAID

## 2018-07-09 ENCOUNTER — NURSE TRIAGE (OUTPATIENT)
Dept: ADMINISTRATIVE | Age: 58
End: 2018-07-09

## 2018-07-09 VITALS
HEART RATE: 66 BPM | DIASTOLIC BLOOD PRESSURE: 61 MMHG | TEMPERATURE: 98.1 F | SYSTOLIC BLOOD PRESSURE: 124 MMHG | WEIGHT: 123 LBS | HEIGHT: 66 IN | BODY MASS INDEX: 19.77 KG/M2 | RESPIRATION RATE: 12 BRPM

## 2018-07-09 DIAGNOSIS — H81.12 BENIGN POSITIONAL VERTIGO, LEFT: Primary | ICD-10-CM

## 2018-07-09 PROCEDURE — 1036F TOBACCO NON-USER: CPT | Performed by: FAMILY MEDICINE

## 2018-07-09 PROCEDURE — G8420 CALC BMI NORM PARAMETERS: HCPCS | Performed by: FAMILY MEDICINE

## 2018-07-09 PROCEDURE — 3017F COLORECTAL CA SCREEN DOC REV: CPT | Performed by: FAMILY MEDICINE

## 2018-07-09 PROCEDURE — 99213 OFFICE O/P EST LOW 20 MIN: CPT | Performed by: FAMILY MEDICINE

## 2018-07-09 PROCEDURE — G8427 DOCREV CUR MEDS BY ELIG CLIN: HCPCS | Performed by: FAMILY MEDICINE

## 2018-07-09 RX ORDER — LOVASTATIN 20 MG/1
TABLET ORAL
Qty: 90 TABLET | Refills: 1 | Status: SHIPPED | OUTPATIENT
Start: 2018-07-09 | End: 2018-12-12 | Stop reason: SDUPTHER

## 2018-07-09 NOTE — PROGRESS NOTES
[address]  [address] [place] [place], [place]  Ph:   [phone]  Fax: [phone]    Provider:  Dr. Joe Dickerson    Patient:  Manette Gowers  YOB: 1960      Visit Date:  7/9/2018     Reason For Visit:   Chief Complaint   Patient presents with    Dizziness     started the day after her colonoscopy. states she notices it when she turns her head to the left she had a dizzy spell. pt states when she bends down or reaches up it happens as well. states the colonoscopy went well. this was done on 07/02/18. states her neck does feel tight but isn't really hurting        Kaitlyn Edmondson is a 56 y.o. female who comes today to the office for dizziness. She states that July 2 she prepared for the colonoscopy with the cleansing, she had the colonoscopy the 3 rd and did well. She had a normal dinner. The next morning when she woke up and tried to get out of bed she felt she was spinning around the bed. She thought she may be dehydrated due to the prep the day before. She went to the kitchen and she walked holding the walls. She drank water, seven up and a banana. She was also a little nauseated. She lie down in her living room, and slept for un hour and when she woke up she felt better. When she stood up she felt off balance and when she would lie down and turn to the left she will feel the spinning. She would turn to the right and the spinning would get better until it stops. Since then they symptoms has not been as severe but she still have then and are trigger by bending, urning and elevating her head or hands. Significant Past Medical History:    Past Medical History:   Diagnosis Date    Hyperlipidemia 12/2013    Mariahdeth Lumary    Migraine 1998    TMJ (dislocation of temporomandibular joint) 10/12/2016    ED visit           Allergies:  is allergic to emetrol.     Medications:   Current Outpatient Prescriptions   Medication Sig Dispense Refill    lovastatin (MEVACOR) 20 MG tablet TAKE Procedures    Basic Metabolic Panel     Standing Status:   Future     Standing Expiration Date:   7/9/2019     Orders Placed This Encounter   Medications    lovastatin (MEVACOR) 20 MG tablet     Sig: TAKE ONE TABLET BY MOUTH NIGHTLY     Dispense:  90 tablet     Refill:  1       Follow Up:  Return in about 2 months (around 9/9/2018).     Felix Bahena MD

## 2018-07-09 NOTE — TELEPHONE ENCOUNTER
Reason for Disposition   [1] MODERATE dizziness (e.g., feels very unsteady, interferes with normal activities) AND [2] has NOT been evaluated by physician for this    Answer Assessment - Initial Assessment Questions  1. DESCRIPTION: \"Describe your dizziness. \"      Dizziness when she turns to the L 2. VERTIGO: \"Do you feel like either you or the room is spinning or tilting? \"       Yes  3. LIGHTHEADED: \"Do you feel lightheaded? \" (e.g., somewhat faint, woozy, weak upon standing)    no  4. SEVERITY: \"How bad is it? \"  \"Can you walk? \"    - MILD - Feels unsteady but walking normally. - MODERATE - Feels very unsteady when walking, but not falling; interferes with normal activities (e.g., school, work) . - SEVERE - Unable to walk without falling (requires assistance). Sl dizzy when she gets out of the car,  But really dizzy if she turns to her L side or pressing the upper L back into something had- gets really dizzy, turns to then R and iut stops   5. ONSET:  \"When did the dizziness begin? \"   July 4th- had a colonoscopy the day before and it is was fine  6. AGGRAVATING FACTORS: \"Does anything make it worse? \" (e.g., standing, change in head position)      Just turning to the L  7. CAUSE: \"What do you think is causing the dizziness? \"   no idea  8. RECURRENT SYMPTOM: \"Have you had dizziness before? \" If so, ask: \"When was the last time? \" \"What happened that time? \"      never  9. OTHER SYMPTOMS: \"Do you have any other symptoms? \" (e.g., headache, weakness, numbness, vomiting, earache)      None  10. PREGNANCY: \"Is there any chance you are pregnant? \" \"When was your last menstrual period? \"      None    Protocols used: DIZZINESS - VERTIGO-ADULT-

## 2018-07-17 ENCOUNTER — NURSE ONLY (OUTPATIENT)
Dept: FAMILY MEDICINE CLINIC | Age: 58
End: 2018-07-17
Payer: MEDICAID

## 2018-07-17 DIAGNOSIS — H81.12 BENIGN POSITIONAL VERTIGO, LEFT: ICD-10-CM

## 2018-07-17 LAB
ANION GAP SERPL CALCULATED.3IONS-SCNC: 16 MEQ/L (ref 8–16)
BUN BLDV-MCNC: 10 MG/DL (ref 7–22)
CALCIUM SERPL-MCNC: 9.7 MG/DL (ref 8.5–10.5)
CHLORIDE BLD-SCNC: 98 MEQ/L (ref 98–111)
CO2: 27 MEQ/L (ref 23–33)
CREAT SERPL-MCNC: 0.5 MG/DL (ref 0.4–1.2)
GFR SERPL CREATININE-BSD FRML MDRD: > 90 ML/MIN/1.73M2
GLUCOSE BLD-MCNC: 134 MG/DL (ref 70–108)
POTASSIUM SERPL-SCNC: 4.2 MEQ/L (ref 3.5–5.2)
SODIUM BLD-SCNC: 141 MEQ/L (ref 135–145)

## 2018-07-17 PROCEDURE — 36415 COLL VENOUS BLD VENIPUNCTURE: CPT | Performed by: FAMILY MEDICINE

## 2018-07-20 ENCOUNTER — TELEPHONE (OUTPATIENT)
Dept: FAMILY MEDICINE CLINIC | Age: 58
End: 2018-07-20

## 2018-07-20 DIAGNOSIS — R73.9 HYPERGLYCEMIA: Primary | ICD-10-CM

## 2018-07-20 NOTE — TELEPHONE ENCOUNTER
Pt states she was not fasting when she did blood test   Order for fasting glucose pending, Dr Emma Recio please add dx

## 2018-07-20 NOTE — TELEPHONE ENCOUNTER
----- Message from Lourdes Gayle MD sent at 7/20/2018  8:27 AM EDT -----  Was her blood test done fasting? She had elevated blood sugar/  If it was not, please repit fasting.   If it was, do HbA1C

## 2018-07-24 ENCOUNTER — TELEPHONE (OUTPATIENT)
Dept: FAMILY MEDICINE CLINIC | Age: 58
End: 2018-07-24

## 2018-07-24 ENCOUNTER — HOSPITAL ENCOUNTER (OUTPATIENT)
Age: 58
Discharge: HOME | End: 2018-07-24
Payer: MEDICAID

## 2018-07-24 DIAGNOSIS — R73.9 HYPERGLYCEMIA: ICD-10-CM

## 2018-07-24 LAB — GLUCOSE FASTING: 100 MG/DL (ref 70–108)

## 2018-07-24 PROCEDURE — 36415 COLL VENOUS BLD VENIPUNCTURE: CPT

## 2018-07-24 PROCEDURE — 82947 ASSAY GLUCOSE BLOOD QUANT: CPT

## 2018-07-24 NOTE — TELEPHONE ENCOUNTER
----- Message from Adrianna Fairchild MD sent at 7/24/2018 10:51 AM EDT -----  Blood test within acceptable ranges

## 2018-09-10 ENCOUNTER — OFFICE VISIT (OUTPATIENT)
Dept: FAMILY MEDICINE CLINIC | Age: 58
End: 2018-09-10
Payer: MEDICAID

## 2018-09-10 VITALS
BODY MASS INDEX: 20.09 KG/M2 | HEIGHT: 66 IN | HEART RATE: 77 BPM | DIASTOLIC BLOOD PRESSURE: 61 MMHG | RESPIRATION RATE: 12 BRPM | WEIGHT: 125 LBS | TEMPERATURE: 97.8 F | SYSTOLIC BLOOD PRESSURE: 124 MMHG

## 2018-09-10 DIAGNOSIS — M72.2 PLANTAR FASCIITIS, RIGHT: ICD-10-CM

## 2018-09-10 DIAGNOSIS — M54.2 NECK PAIN: ICD-10-CM

## 2018-09-10 DIAGNOSIS — G43.009 MIGRAINE WITHOUT AURA AND WITHOUT STATUS MIGRAINOSUS, NOT INTRACTABLE: ICD-10-CM

## 2018-09-10 DIAGNOSIS — E78.00 HYPERCHOLESTEROLEMIA: Primary | ICD-10-CM

## 2018-09-10 DIAGNOSIS — Z23 NEEDS FLU SHOT: ICD-10-CM

## 2018-09-10 PROCEDURE — 90471 IMMUNIZATION ADMIN: CPT | Performed by: FAMILY MEDICINE

## 2018-09-10 PROCEDURE — G8427 DOCREV CUR MEDS BY ELIG CLIN: HCPCS | Performed by: FAMILY MEDICINE

## 2018-09-10 PROCEDURE — G8420 CALC BMI NORM PARAMETERS: HCPCS | Performed by: FAMILY MEDICINE

## 2018-09-10 PROCEDURE — 1036F TOBACCO NON-USER: CPT | Performed by: FAMILY MEDICINE

## 2018-09-10 PROCEDURE — 90688 IIV4 VACCINE SPLT 0.5 ML IM: CPT | Performed by: FAMILY MEDICINE

## 2018-09-10 PROCEDURE — 99214 OFFICE O/P EST MOD 30 MIN: CPT | Performed by: FAMILY MEDICINE

## 2018-09-10 PROCEDURE — 3017F COLORECTAL CA SCREEN DOC REV: CPT | Performed by: FAMILY MEDICINE

## 2018-09-10 NOTE — PATIENT INSTRUCTIONS
your arms or legs. (This could make it hard to stand up.)     · You lose control of your bladder or bowels.    Watch closely for changes in your health, and be sure to contact your doctor if:    · Your neck pain is getting worse.     · You are not getting better after 1 week.     · You do not get better as expected. Where can you learn more? Go to https://chpepiceweb.[employer]. org and sign in to your [email or web address].[email or web address] account. Enter [id number] in the [employer] box to learn more about \"Neck Pain: Care Instructions. \"     If you do not have an account, please click on the \"Sign Up Now\" link. Current as of: November 29, 2017  Content Version: 11.7  © 0317-9182 [employer] ([employer]). Care instructions adapted under license by [facility] ([facility]). If you have questions about a medical condition or this instruction, always ask your healthcare professional. [place] [address] any warranty or liability for your use of this information. Patient Education        Neck: Exercises  Your Care Instructions  Here are some examples of typical rehabilitation exercises for your condition. Start each exercise slowly. Ease off the exercise if you start to have pain. Your doctor or physical therapist will tell you when you can start these exercises and which ones will work best for you. How to do the exercises  Neck stretch    1. This stretch works best if you keep your shoulder down as you lean away from it. To help you remember to do this, start by relaxing your shoulders and lightly holding on to your thighs or your chair. 2. Tilt your head toward your shoulder and hold for 15 to 30 seconds. Let the weight of your head stretch your muscles. 3. If you would like a little added stretch, use your hand to gently and steadily pull your head toward your shoulder. For example, keeping your right shoulder down, lean your head to the left. 4. Repeat 2 to 4 times toward each shoulder.   Diagonal neck please click on the \"Sign Up Now\" link. Current as of: November 29, 2017  Content Version: 11.7  © 1643-6553 [employer]. Care instructions adapted under license by [facility] ([facility]). If you have questions about a medical condition or this instruction, always ask your healthcare professional. [address] any warranty or liability for your use of this information. Patient Education        Neck Arthritis: Exercises  Your Care Instructions  Here are some examples of typical rehabilitation exercises for your condition. Start each exercise slowly. Ease off the exercise if you start to have pain. Your doctor or physical therapist will tell you when you can start these exercises and which ones will work best for you. How to do the exercises  Neck stretches to the side    5. This stretch works best if you keep your shoulder down as you lean away from it. To help you remember to do this, start by relaxing your shoulders and lightly holding on to your thighs or your chair. 6. Tilt your head toward your shoulder and hold for 15 to 30 seconds. Let the weight of your head stretch your muscles. 7. Repeat 2 to 4 times toward each shoulder. Chin tuck    4. Lie on the floor with a rolled-up towel under your neck. Your head should be touching the floor. 5. Slowly bring your chin toward your chest.  6. Hold for a count of 6, and then relax for up to 10 seconds. 7. Repeat 8 to 12 times. Active cervical rotation    5. Sit in a firm chair, or stand up straight. 6. Keeping your chin level, turn your head to the right, and hold for 15 to 30 seconds. 7. Turn your head to the left and hold for 15 to 30 seconds. 8. Repeat 2 to 4 times to each side. Shoulder blade squeeze    6. While standing, squeeze your shoulder blades together. 7. Do not raise your shoulders up as you are squeezing. 8. Hold for 6 seconds. 9. Repeat 8 to 12 times. Shoulder rolls    3.  Sit comfortably with your feet keep a list of the medicines you take. How can you care for yourself at home? · Take anti-inflammatory medicines to reduce neck pain. These include ibuprofen (Advil, Motrin) and naproxen (Aleve). Be safe with medicines. Read and follow all instructions on the label. · Follow your doctor's recommendation about activity. He or she may tell you not to do sports or activities that could injure your neck. When should you call for help? Call 911 anytime you think you may need emergency care. For example, call if:    · You are unable to move an arm or a leg at all.   [facility] your doctor now or seek immediate medical care if:    · You have new or worse symptoms in your arms, legs, belly, or buttocks. Symptoms may include:  ¨ Numbness or tingling. ¨ Weakness. ¨ Pain.     · You lose bladder or bowel control.    Watch closely for changes in your health, and be sure to contact your doctor if:    · You do not get better as expected. Where can you learn more? Go to https://Common Interest Communities.Ku. org and sign in to your Pix4D account. Enter Q326 in the [employer] box to learn more about \"Cervical Spondylosis: Care Instructions. \"     If you do not have an account, please click on the \"Sign Up Now\" link. Current as of: November 29, 2017  Content Version: 11.7  © 4075-2116 [employer]. Care instructions adapted under license by [facility] ([facility]). If you have questions about a medical condition or this instruction, always ask your healthcare professional. [place] [address] any warranty or liability for your use of this information. Patient Education        Plantar Fasciitis: Care Instructions  Your Care Instructions    Plantar fasciitis is pain and inflammation of the plantar fascia, the tissue at the bottom of your foot that connects the heel bone to the toes. The plantar fascia also supports the arch.  If you strain the plantar fascia, it can develop small tears and cause heel pain when you stand or walk. Plantar fasciitis can be caused by running or other sports. It also may occur in people who are overweight or who have high arches or flat feet. You may get plantar fasciitis if you walk or stand for long periods, or have a tight Achilles tendon or calf muscles. You can improve your foot pain with rest and other care at home. It might take a few weeks to a few months for your foot to heal completely. Follow-up care is a key part of your treatment and safety. Be sure to make and go to all appointments, and call your doctor if you are having problems. It's also a good idea to know your test results and keep a list of the medicines you take. How can you care for yourself at home? · Rest your feet often. Reduce your activity to a level that lets you avoid pain. If possible, do not run or walk on hard surfaces. · Take pain medicines exactly as directed. ¨ If the doctor gave you a prescription medicine for pain, take it as prescribed. ¨ If you are not taking a prescription pain medicine, take an over-the-counter anti-inflammatory medicine for pain and swelling, such as ibuprofen (Advil, Motrin) or naproxen (Aleve). Read and follow all instructions on the label. · Use ice massage to help with pain and swelling. You can use an ice cube or an ice cup several times a day. To make an ice cup, fill a paper cup with water and freeze it. Cut off the top of the cup until a half-inch of ice shows. Hold onto the remaining paper to use the cup. Rub the ice in small circles over the area for 5 to 7 minutes. · Contrast baths, which alternate hot and cold water, can also help reduce swelling. But because heat alone may make pain and swelling worse, end a contrast bath with a soak in cold water. · Wear a night splint if your doctor suggests it. A night splint holds your foot with the toes pointed up and the foot and ankle at a 90-degree angle.  This position gives the bottom of your foot a constant, gentle stretch. · Do simple exercises such as calf stretches and towel stretches 2 to 3 times each day, especially when you first get up in the morning. These can help the plantar fascia become more flexible. They also make the muscles that support your arch stronger. Hold these stretches for 15 to 30 seconds per stretch. Repeat 2 to 4 times. ¨ Stand about 1 foot from a wall. Place the palms of both hands against the wall at chest level. Lean forward against the wall, keeping one leg with the knee straight and heel on the ground while bending the knee of the other leg. ¨ Sit down on the floor or a mat with your feet stretched in front of you. Roll up a towel lengthwise, and loop it over the ball of your foot. Holding the towel at both ends, gently pull the towel toward you to stretch your foot. · Wear shoes with good arch support. Athletic shoes or shoes with a well-cushioned sole are good choices. · Try heel cups or shoe inserts (orthotics) to help cushion your heel. You can buy these at many shoe stores. · Put on your shoes as soon as you get out of bed. Going barefoot or wearing slippers may make your pain worse. · Reach and stay at a good weight for your height. This puts less strain on your feet. When should you call for help? Call your doctor now or seek immediate medical care if:    · You have heel pain with fever, redness, or warmth in your heel.     · You cannot put weight on the sore foot.    Watch closely for changes in your health, and be sure to contact your doctor if:    · You have numbness or tingling in your heel.     · Your heel pain lasts more than 2 weeks. Where can you learn more? Go to https://OneGoodLove.commarineb.Paracosm. org and sign in to your cube19 account. Enter B794 in the "[employer] box to learn more about \"Plantar Fasciitis: Care Instructions. \"     If you do not have an account, please click on the \"Sign Up Now\" link.   Current as of: November 29,

## 2018-10-13 LAB
CHOLESTEROL, TOTAL: 180 MG/DL
CHOLESTEROL/HDL RATIO: 1.4
HDLC SERPL-MCNC: 68 MG/DL (ref 35–70)
LDL CHOLESTEROL CALCULATED: 96 MG/DL (ref 0–160)
TRIGL SERPL-MCNC: 78 MG/DL
VLDLC SERPL CALC-MCNC: NORMAL MG/DL

## 2018-12-12 ENCOUNTER — OFFICE VISIT (OUTPATIENT)
Dept: FAMILY MEDICINE CLINIC | Age: 58
End: 2018-12-12
Payer: MEDICAID

## 2018-12-12 VITALS
TEMPERATURE: 98 F | SYSTOLIC BLOOD PRESSURE: 129 MMHG | DIASTOLIC BLOOD PRESSURE: 58 MMHG | BODY MASS INDEX: 20.41 KG/M2 | HEART RATE: 68 BPM | WEIGHT: 127 LBS | HEIGHT: 66 IN | RESPIRATION RATE: 14 BRPM

## 2018-12-12 DIAGNOSIS — R51.9 HEADACHE CAUSING FREQUENT AWAKENING FROM SLEEP: ICD-10-CM

## 2018-12-12 DIAGNOSIS — M72.2 PLANTAR FASCIITIS, RIGHT: ICD-10-CM

## 2018-12-12 DIAGNOSIS — G43.009 MIGRAINE WITHOUT AURA AND WITHOUT STATUS MIGRAINOSUS, NOT INTRACTABLE: ICD-10-CM

## 2018-12-12 DIAGNOSIS — M54.9 UPPER BACK PAIN: Primary | ICD-10-CM

## 2018-12-12 DIAGNOSIS — E78.00 HYPERCHOLESTEROLEMIA: ICD-10-CM

## 2018-12-12 PROCEDURE — G8482 FLU IMMUNIZE ORDER/ADMIN: HCPCS | Performed by: FAMILY MEDICINE

## 2018-12-12 PROCEDURE — 3017F COLORECTAL CA SCREEN DOC REV: CPT | Performed by: FAMILY MEDICINE

## 2018-12-12 PROCEDURE — G8420 CALC BMI NORM PARAMETERS: HCPCS | Performed by: FAMILY MEDICINE

## 2018-12-12 PROCEDURE — 99214 OFFICE O/P EST MOD 30 MIN: CPT | Performed by: FAMILY MEDICINE

## 2018-12-12 PROCEDURE — 1036F TOBACCO NON-USER: CPT | Performed by: FAMILY MEDICINE

## 2018-12-12 PROCEDURE — G8427 DOCREV CUR MEDS BY ELIG CLIN: HCPCS | Performed by: FAMILY MEDICINE

## 2018-12-12 RX ORDER — TIZANIDINE 4 MG/1
TABLET ORAL
Qty: 30 TABLET | Refills: 0 | Status: SHIPPED | OUTPATIENT
Start: 2018-12-12 | End: 2019-03-13 | Stop reason: ALTCHOICE

## 2018-12-12 RX ORDER — LOVASTATIN 20 MG/1
TABLET ORAL
Qty: 90 TABLET | Refills: 1 | Status: SHIPPED | OUTPATIENT
Start: 2018-12-12 | End: 2019-05-07 | Stop reason: SDUPTHER

## 2018-12-12 NOTE — PROGRESS NOTES
Refill:  0    lovastatin (MEVACOR) 20 MG tablet     Sig: TAKE ONE TABLET BY MOUTH NIGHTLY     Dispense:  90 tablet     Refill:  1     Continue Lovastatin 20 mg 1 PO QHS  Continue coenzyme Q 1o and garlic      Follow Up:  Return in about 3 months (around 3/12/2019).     Massimo Arzola MD

## 2018-12-27 ENCOUNTER — TELEPHONE (OUTPATIENT)
Dept: FAMILY MEDICINE CLINIC | Age: 58
End: 2018-12-27

## 2018-12-28 ENCOUNTER — HOSPITAL ENCOUNTER (OUTPATIENT)
Dept: GENERAL RADIOLOGY | Age: 58
Discharge: HOME | End: 2018-12-28
Payer: MEDICAID

## 2018-12-28 ENCOUNTER — HOSPITAL ENCOUNTER (OUTPATIENT)
Dept: MRI IMAGING | Age: 58
Discharge: HOME | End: 2018-12-28
Payer: MEDICAID

## 2018-12-28 DIAGNOSIS — M54.9 UPPER BACK PAIN: ICD-10-CM

## 2018-12-28 DIAGNOSIS — R51.9 HEADACHE CAUSING FREQUENT AWAKENING FROM SLEEP: ICD-10-CM

## 2018-12-28 PROCEDURE — 72148 MRI LUMBAR SPINE W/O DYE: CPT

## 2018-12-28 PROCEDURE — 72072 X-RAY EXAM THORAC SPINE 3VWS: CPT

## 2019-01-03 ENCOUNTER — TELEPHONE (OUTPATIENT)
Dept: FAMILY MEDICINE CLINIC | Age: 59
End: 2019-01-03

## 2019-01-03 ENCOUNTER — HOSPITAL ENCOUNTER (OUTPATIENT)
Dept: PHYSICAL THERAPY | Age: 59
Setting detail: THERAPIES SERIES
Discharge: HOME | End: 2019-01-03
Payer: MEDICAID

## 2019-01-03 PROCEDURE — 97161 PT EVAL LOW COMPLEX 20 MIN: CPT

## 2019-01-03 PROCEDURE — 97035 APP MDLTY 1+ULTRASOUND EA 15: CPT

## 2019-01-03 ASSESSMENT — PAIN DESCRIPTION - LOCATION: LOCATION: BACK;NECK

## 2019-01-03 ASSESSMENT — PAIN SCALES - GENERAL: PAINLEVEL_OUTOF10: 4

## 2019-01-03 ASSESSMENT — PAIN DESCRIPTION - ORIENTATION: ORIENTATION: UPPER

## 2019-01-03 ASSESSMENT — PAIN DESCRIPTION - PAIN TYPE: TYPE: CHRONIC PAIN

## 2019-01-04 ENCOUNTER — HOSPITAL ENCOUNTER (OUTPATIENT)
Dept: PHYSICAL THERAPY | Age: 59
Setting detail: THERAPIES SERIES
Discharge: HOME | End: 2019-01-04
Payer: MEDICAID

## 2019-01-04 PROCEDURE — 97110 THERAPEUTIC EXERCISES: CPT

## 2019-01-04 PROCEDURE — 97035 APP MDLTY 1+ULTRASOUND EA 15: CPT

## 2019-01-08 ENCOUNTER — HOSPITAL ENCOUNTER (OUTPATIENT)
Dept: PHYSICAL THERAPY | Age: 59
Setting detail: THERAPIES SERIES
Discharge: HOME | End: 2019-01-08
Payer: MEDICAID

## 2019-01-08 PROCEDURE — 97110 THERAPEUTIC EXERCISES: CPT

## 2019-01-08 PROCEDURE — 97035 APP MDLTY 1+ULTRASOUND EA 15: CPT

## 2019-01-08 ASSESSMENT — PAIN DESCRIPTION - ORIENTATION: ORIENTATION: UPPER

## 2019-01-08 ASSESSMENT — PAIN DESCRIPTION - LOCATION: LOCATION: BACK;NECK

## 2019-01-08 ASSESSMENT — PAIN DESCRIPTION - PAIN TYPE: TYPE: CHRONIC PAIN

## 2019-01-08 ASSESSMENT — PAIN SCALES - GENERAL: PAINLEVEL_OUTOF10: 2

## 2019-01-10 ENCOUNTER — HOSPITAL ENCOUNTER (OUTPATIENT)
Dept: PHYSICAL THERAPY | Age: 59
Setting detail: THERAPIES SERIES
Discharge: HOME | End: 2019-01-10
Payer: MEDICAID

## 2019-01-10 PROCEDURE — 97035 APP MDLTY 1+ULTRASOUND EA 15: CPT

## 2019-01-10 PROCEDURE — 97110 THERAPEUTIC EXERCISES: CPT

## 2019-01-10 ASSESSMENT — PAIN DESCRIPTION - LOCATION: LOCATION: BACK

## 2019-01-10 ASSESSMENT — PAIN DESCRIPTION - ORIENTATION: ORIENTATION: UPPER

## 2019-01-10 ASSESSMENT — PAIN SCALES - GENERAL: PAINLEVEL_OUTOF10: 3

## 2019-01-14 ENCOUNTER — APPOINTMENT (OUTPATIENT)
Dept: PHYSICAL THERAPY | Age: 59
End: 2019-01-14
Payer: MEDICAID

## 2019-01-15 ENCOUNTER — HOSPITAL ENCOUNTER (OUTPATIENT)
Dept: PHYSICAL THERAPY | Age: 59
Setting detail: THERAPIES SERIES
Discharge: HOME | End: 2019-01-15
Payer: MEDICAID

## 2019-01-15 PROCEDURE — 97110 THERAPEUTIC EXERCISES: CPT

## 2019-01-17 ENCOUNTER — HOSPITAL ENCOUNTER (OUTPATIENT)
Dept: PHYSICAL THERAPY | Age: 59
Setting detail: THERAPIES SERIES
Discharge: HOME | End: 2019-01-17
Payer: MEDICAID

## 2019-01-17 PROCEDURE — 97110 THERAPEUTIC EXERCISES: CPT

## 2019-01-17 ASSESSMENT — PAIN SCALES - GENERAL: PAINLEVEL_OUTOF10: 2

## 2019-01-17 ASSESSMENT — PAIN DESCRIPTION - LOCATION: LOCATION: NECK

## 2019-01-20 ENCOUNTER — HOSPITAL ENCOUNTER (EMERGENCY)
Age: 59
Discharge: HOME | End: 2019-01-20
Payer: MEDICAID

## 2019-01-20 VITALS
BODY MASS INDEX: 20.34 KG/M2 | WEIGHT: 126 LBS | TEMPERATURE: 98.4 F | DIASTOLIC BLOOD PRESSURE: 68 MMHG | HEART RATE: 90 BPM | OXYGEN SATURATION: 97 % | RESPIRATION RATE: 16 BRPM | SYSTOLIC BLOOD PRESSURE: 139 MMHG

## 2019-01-20 DIAGNOSIS — J02.0 ACUTE STREPTOCOCCAL PHARYNGITIS: Primary | ICD-10-CM

## 2019-01-20 LAB
GROUP A STREP CULTURE, REFLEX: POSITIVE
REFLEX THROAT C + S: NORMAL

## 2019-01-20 PROCEDURE — 99213 OFFICE O/P EST LOW 20 MIN: CPT

## 2019-01-20 PROCEDURE — 99213 OFFICE O/P EST LOW 20 MIN: CPT | Performed by: NURSE PRACTITIONER

## 2019-01-20 RX ORDER — AMOXICILLIN AND CLAVULANATE POTASSIUM 875; 125 MG/1; MG/1
1 TABLET, FILM COATED ORAL 2 TIMES DAILY
Qty: 20 TABLET | Refills: 0 | Status: SHIPPED | OUTPATIENT
Start: 2019-01-20 | End: 2019-01-30 | Stop reason: ALTCHOICE

## 2019-01-20 ASSESSMENT — ENCOUNTER SYMPTOMS
COUGH: 0
BACK PAIN: 0
PHOTOPHOBIA: 0
SINUS PRESSURE: 0
CONSTIPATION: 0
NAUSEA: 0
SORE THROAT: 1
RHINORRHEA: 0
DIARRHEA: 0
SHORTNESS OF BREATH: 0
APNEA: 0
ABDOMINAL PAIN: 0
VOMITING: 0

## 2019-01-20 ASSESSMENT — PAIN SCALES - GENERAL: PAINLEVEL_OUTOF10: 6

## 2019-01-20 ASSESSMENT — PAIN DESCRIPTION - FREQUENCY: FREQUENCY: CONTINUOUS

## 2019-01-20 ASSESSMENT — PAIN DESCRIPTION - PAIN TYPE: TYPE: ACUTE PAIN

## 2019-01-20 ASSESSMENT — PAIN DESCRIPTION - DESCRIPTORS: DESCRIPTORS: ACHING;BURNING

## 2019-01-20 ASSESSMENT — PAIN DESCRIPTION - LOCATION: LOCATION: THROAT

## 2019-01-20 ASSESSMENT — PAIN - FUNCTIONAL ASSESSMENT: PAIN_FUNCTIONAL_ASSESSMENT: ACTIVITIES ARE NOT PREVENTED

## 2019-01-22 ENCOUNTER — HOSPITAL ENCOUNTER (OUTPATIENT)
Dept: PHYSICAL THERAPY | Age: 59
Setting detail: THERAPIES SERIES
Discharge: HOME | End: 2019-01-22
Payer: MEDICAID

## 2019-01-22 PROCEDURE — 97110 THERAPEUTIC EXERCISES: CPT

## 2019-01-22 ASSESSMENT — PAIN SCALES - GENERAL: PAINLEVEL_OUTOF10: 4

## 2019-01-24 ENCOUNTER — APPOINTMENT (OUTPATIENT)
Dept: PHYSICAL THERAPY | Age: 59
End: 2019-01-24
Payer: MEDICAID

## 2019-01-25 ENCOUNTER — HOSPITAL ENCOUNTER (OUTPATIENT)
Dept: PHYSICAL THERAPY | Age: 59
Setting detail: THERAPIES SERIES
Discharge: HOME | End: 2019-01-25
Payer: MEDICAID

## 2019-01-25 PROCEDURE — 97110 THERAPEUTIC EXERCISES: CPT

## 2019-01-25 ASSESSMENT — PAIN DESCRIPTION - LOCATION: LOCATION: NECK;HEAD

## 2019-01-25 ASSESSMENT — PAIN SCALES - GENERAL: PAINLEVEL_OUTOF10: 3

## 2019-01-30 ENCOUNTER — OFFICE VISIT (OUTPATIENT)
Dept: FAMILY MEDICINE CLINIC | Age: 59
End: 2019-01-30
Payer: MEDICAID

## 2019-01-30 VITALS
HEIGHT: 66 IN | DIASTOLIC BLOOD PRESSURE: 67 MMHG | OXYGEN SATURATION: 99 % | BODY MASS INDEX: 20.89 KG/M2 | TEMPERATURE: 98.1 F | HEART RATE: 80 BPM | WEIGHT: 130 LBS | SYSTOLIC BLOOD PRESSURE: 149 MMHG

## 2019-01-30 DIAGNOSIS — R21 GENERALIZED MACULOPAPULAR RASH: Primary | ICD-10-CM

## 2019-01-30 PROCEDURE — G8427 DOCREV CUR MEDS BY ELIG CLIN: HCPCS | Performed by: FAMILY MEDICINE

## 2019-01-30 PROCEDURE — G8420 CALC BMI NORM PARAMETERS: HCPCS | Performed by: FAMILY MEDICINE

## 2019-01-30 PROCEDURE — 99213 OFFICE O/P EST LOW 20 MIN: CPT | Performed by: FAMILY MEDICINE

## 2019-01-30 PROCEDURE — 1036F TOBACCO NON-USER: CPT | Performed by: FAMILY MEDICINE

## 2019-01-30 PROCEDURE — 3017F COLORECTAL CA SCREEN DOC REV: CPT | Performed by: FAMILY MEDICINE

## 2019-01-30 PROCEDURE — G8482 FLU IMMUNIZE ORDER/ADMIN: HCPCS | Performed by: FAMILY MEDICINE

## 2019-01-30 RX ORDER — METHYLPREDNISOLONE ACETATE 40 MG/ML
40 INJECTION, SUSPENSION INTRA-ARTICULAR; INTRALESIONAL; INTRAMUSCULAR; SOFT TISSUE ONCE
Qty: 1 ML | Refills: 0
Start: 2019-01-30 | End: 2019-01-30 | Stop reason: CLARIF

## 2019-01-30 RX ORDER — BETAMETHASONE SODIUM PHOSPHATE AND BETAMETHASONE ACETATE 3; 3 MG/ML; MG/ML
6 INJECTION, SUSPENSION INTRA-ARTICULAR; INTRALESIONAL; INTRAMUSCULAR; SOFT TISSUE ONCE
Status: COMPLETED | OUTPATIENT
Start: 2019-01-30 | End: 2019-01-30

## 2019-01-30 RX ORDER — METHYLPREDNISOLONE ACETATE 40 MG/ML
40 INJECTION, SUSPENSION INTRA-ARTICULAR; INTRALESIONAL; INTRAMUSCULAR; SOFT TISSUE ONCE
Status: COMPLETED | OUTPATIENT
Start: 2019-01-30 | End: 2019-01-30

## 2019-01-30 RX ADMIN — METHYLPREDNISOLONE ACETATE 40 MG: 40 INJECTION, SUSPENSION INTRA-ARTICULAR; INTRALESIONAL; INTRAMUSCULAR; SOFT TISSUE at 15:32

## 2019-01-30 RX ADMIN — BETAMETHASONE SODIUM PHOSPHATE AND BETAMETHASONE ACETATE 6 MG: 3; 3 INJECTION, SUSPENSION INTRA-ARTICULAR; INTRALESIONAL; INTRAMUSCULAR; SOFT TISSUE at 15:31

## 2019-02-01 ENCOUNTER — TELEPHONE (OUTPATIENT)
Dept: FAMILY MEDICINE CLINIC | Age: 59
End: 2019-02-01

## 2019-02-01 ENCOUNTER — OFFICE VISIT (OUTPATIENT)
Dept: FAMILY MEDICINE CLINIC | Age: 59
End: 2019-02-01
Payer: MEDICAID

## 2019-02-01 VITALS
RESPIRATION RATE: 14 BRPM | DIASTOLIC BLOOD PRESSURE: 62 MMHG | HEART RATE: 70 BPM | TEMPERATURE: 97.8 F | SYSTOLIC BLOOD PRESSURE: 127 MMHG

## 2019-02-01 DIAGNOSIS — R21 GENERALIZED MACULOPAPULAR RASH: Primary | ICD-10-CM

## 2019-02-01 PROCEDURE — G8420 CALC BMI NORM PARAMETERS: HCPCS | Performed by: NURSE PRACTITIONER

## 2019-02-01 PROCEDURE — 1036F TOBACCO NON-USER: CPT | Performed by: NURSE PRACTITIONER

## 2019-02-01 PROCEDURE — G8482 FLU IMMUNIZE ORDER/ADMIN: HCPCS | Performed by: NURSE PRACTITIONER

## 2019-02-01 PROCEDURE — G8427 DOCREV CUR MEDS BY ELIG CLIN: HCPCS | Performed by: NURSE PRACTITIONER

## 2019-02-01 PROCEDURE — 99213 OFFICE O/P EST LOW 20 MIN: CPT | Performed by: NURSE PRACTITIONER

## 2019-02-01 PROCEDURE — 3017F COLORECTAL CA SCREEN DOC REV: CPT | Performed by: NURSE PRACTITIONER

## 2019-02-01 RX ORDER — PREDNISONE 20 MG/1
20 TABLET ORAL DAILY
Qty: 5 TABLET | Refills: 0 | Status: SHIPPED | OUTPATIENT
Start: 2019-02-01 | End: 2019-02-06

## 2019-02-01 RX ORDER — CETIRIZINE HYDROCHLORIDE 10 MG/1
10 TABLET ORAL 2 TIMES DAILY
Qty: 14 TABLET | Refills: 0 | Status: SHIPPED | OUTPATIENT
Start: 2019-02-01 | End: 2019-05-01 | Stop reason: ALTCHOICE

## 2019-03-13 ENCOUNTER — OFFICE VISIT (OUTPATIENT)
Dept: FAMILY MEDICINE CLINIC | Age: 59
End: 2019-03-13
Payer: MEDICAID

## 2019-03-13 VITALS
BODY MASS INDEX: 21.05 KG/M2 | HEART RATE: 62 BPM | SYSTOLIC BLOOD PRESSURE: 109 MMHG | TEMPERATURE: 97.9 F | HEIGHT: 66 IN | DIASTOLIC BLOOD PRESSURE: 61 MMHG | RESPIRATION RATE: 10 BRPM | WEIGHT: 131 LBS

## 2019-03-13 DIAGNOSIS — E78.00 HYPERCHOLESTEROLEMIA: ICD-10-CM

## 2019-03-13 DIAGNOSIS — M72.2 PLANTAR FASCIITIS OF RIGHT FOOT: ICD-10-CM

## 2019-03-13 DIAGNOSIS — M79.671 FOOT PAIN, RIGHT: Primary | ICD-10-CM

## 2019-03-13 DIAGNOSIS — G43.909 MIGRAINE WITHOUT STATUS MIGRAINOSUS, NOT INTRACTABLE, UNSPECIFIED MIGRAINE TYPE: ICD-10-CM

## 2019-03-13 PROCEDURE — 99214 OFFICE O/P EST MOD 30 MIN: CPT | Performed by: FAMILY MEDICINE

## 2019-03-13 PROCEDURE — G8427 DOCREV CUR MEDS BY ELIG CLIN: HCPCS | Performed by: FAMILY MEDICINE

## 2019-03-13 PROCEDURE — G8420 CALC BMI NORM PARAMETERS: HCPCS | Performed by: FAMILY MEDICINE

## 2019-03-13 PROCEDURE — 1036F TOBACCO NON-USER: CPT | Performed by: FAMILY MEDICINE

## 2019-03-13 PROCEDURE — 3017F COLORECTAL CA SCREEN DOC REV: CPT | Performed by: FAMILY MEDICINE

## 2019-03-13 PROCEDURE — G8482 FLU IMMUNIZE ORDER/ADMIN: HCPCS | Performed by: FAMILY MEDICINE

## 2019-03-13 ASSESSMENT — PATIENT HEALTH QUESTIONNAIRE - PHQ9
1. LITTLE INTEREST OR PLEASURE IN DOING THINGS: 0
2. FEELING DOWN, DEPRESSED OR HOPELESS: 0
SUM OF ALL RESPONSES TO PHQ QUESTIONS 1-9: 0
SUM OF ALL RESPONSES TO PHQ QUESTIONS 1-9: 0
SUM OF ALL RESPONSES TO PHQ9 QUESTIONS 1 & 2: 0

## 2019-03-14 ENCOUNTER — HOSPITAL ENCOUNTER (OUTPATIENT)
Age: 59
Discharge: HOME | End: 2019-03-14
Payer: MEDICAID

## 2019-03-14 ENCOUNTER — HOSPITAL ENCOUNTER (OUTPATIENT)
Dept: GENERAL RADIOLOGY | Age: 59
Discharge: HOME | End: 2019-03-14
Payer: MEDICAID

## 2019-03-14 DIAGNOSIS — M79.671 FOOT PAIN, RIGHT: ICD-10-CM

## 2019-03-14 DIAGNOSIS — M72.2 PLANTAR FASCIITIS OF RIGHT FOOT: ICD-10-CM

## 2019-03-14 PROCEDURE — 73630 X-RAY EXAM OF FOOT: CPT

## 2019-03-15 ENCOUNTER — TELEPHONE (OUTPATIENT)
Dept: FAMILY MEDICINE CLINIC | Age: 59
End: 2019-03-15

## 2019-05-01 ENCOUNTER — OFFICE VISIT (OUTPATIENT)
Dept: FAMILY MEDICINE CLINIC | Age: 59
End: 2019-05-01
Payer: MEDICAID

## 2019-05-01 VITALS
TEMPERATURE: 98 F | SYSTOLIC BLOOD PRESSURE: 134 MMHG | RESPIRATION RATE: 12 BRPM | BODY MASS INDEX: 19.86 KG/M2 | WEIGHT: 123.6 LBS | HEIGHT: 66 IN | HEART RATE: 71 BPM | DIASTOLIC BLOOD PRESSURE: 63 MMHG

## 2019-05-01 DIAGNOSIS — R42 VERTIGO: ICD-10-CM

## 2019-05-01 DIAGNOSIS — B34.9 ACUTE VIRAL SYNDROME: Primary | ICD-10-CM

## 2019-05-01 DIAGNOSIS — H65.191 ACUTE MIDDLE EAR EFFUSION, RIGHT: ICD-10-CM

## 2019-05-01 LAB
ANION GAP SERPL CALCULATED.3IONS-SCNC: 16 MEQ/L (ref 8–16)
BASOPHILS # BLD: 0.5 %
BASOPHILS ABSOLUTE: 0 THOU/MM3 (ref 0–0.1)
BUN BLDV-MCNC: 8 MG/DL (ref 7–22)
CALCIUM SERPL-MCNC: 9.8 MG/DL (ref 8.5–10.5)
CHLORIDE BLD-SCNC: 99 MEQ/L (ref 98–111)
CO2: 27 MEQ/L (ref 23–33)
CREAT SERPL-MCNC: 0.5 MG/DL (ref 0.4–1.2)
EOSINOPHIL # BLD: 0.8 %
EOSINOPHILS ABSOLUTE: 0 THOU/MM3 (ref 0–0.4)
ERYTHROCYTE [DISTWIDTH] IN BLOOD BY AUTOMATED COUNT: 13.2 % (ref 11.5–14.5)
ERYTHROCYTE [DISTWIDTH] IN BLOOD BY AUTOMATED COUNT: 41.2 FL (ref 35–45)
GFR SERPL CREATININE-BSD FRML MDRD: > 90 ML/MIN/1.73M2
GLUCOSE BLD-MCNC: 113 MG/DL (ref 70–108)
HCT VFR BLD CALC: 46.2 % (ref 37–47)
HEMOGLOBIN: 15.2 GM/DL (ref 12–16)
IMMATURE GRANS (ABS): 0.01 THOU/MM3 (ref 0–0.07)
IMMATURE GRANULOCYTES: 0.3 %
LYMPHOCYTES # BLD: 32.1 %
LYMPHOCYTES ABSOLUTE: 1.2 THOU/MM3 (ref 1–4.8)
MCH RBC QN AUTO: 28.4 PG (ref 26–33)
MCHC RBC AUTO-ENTMCNC: 32.9 GM/DL (ref 32.2–35.5)
MCV RBC AUTO: 86.2 FL (ref 81–99)
MONOCYTES # BLD: 9.1 %
MONOCYTES ABSOLUTE: 0.3 THOU/MM3 (ref 0.4–1.3)
NUCLEATED RED BLOOD CELLS: 0 /100 WBC
PLATELET # BLD: 271 THOU/MM3 (ref 130–400)
PMV BLD AUTO: 10.5 FL (ref 9.4–12.4)
POTASSIUM SERPL-SCNC: 4 MEQ/L (ref 3.5–5.2)
RBC # BLD: 5.36 MILL/MM3 (ref 4.2–5.4)
SEG NEUTROPHILS: 57.2 %
SEGMENTED NEUTROPHILS ABSOLUTE COUNT: 2.2 THOU/MM3 (ref 1.8–7.7)
SODIUM BLD-SCNC: 142 MEQ/L (ref 135–145)
WBC # BLD: 3.8 THOU/MM3 (ref 4.8–10.8)

## 2019-05-01 PROCEDURE — G8427 DOCREV CUR MEDS BY ELIG CLIN: HCPCS | Performed by: NURSE PRACTITIONER

## 2019-05-01 PROCEDURE — G8420 CALC BMI NORM PARAMETERS: HCPCS | Performed by: NURSE PRACTITIONER

## 2019-05-01 PROCEDURE — 1036F TOBACCO NON-USER: CPT | Performed by: NURSE PRACTITIONER

## 2019-05-01 PROCEDURE — 99214 OFFICE O/P EST MOD 30 MIN: CPT | Performed by: NURSE PRACTITIONER

## 2019-05-01 PROCEDURE — 36415 COLL VENOUS BLD VENIPUNCTURE: CPT | Performed by: NURSE PRACTITIONER

## 2019-05-01 PROCEDURE — 3017F COLORECTAL CA SCREEN DOC REV: CPT | Performed by: NURSE PRACTITIONER

## 2019-05-01 RX ORDER — CETIRIZINE HYDROCHLORIDE 10 MG/1
10 TABLET ORAL DAILY
Qty: 30 TABLET | Refills: 0 | Status: SHIPPED | OUTPATIENT
Start: 2019-05-01 | End: 2019-05-31

## 2019-05-01 RX ORDER — MECLIZINE HYDROCHLORIDE 25 MG/1
25 TABLET ORAL 3 TIMES DAILY PRN
Qty: 30 TABLET | Refills: 0 | Status: SHIPPED | OUTPATIENT
Start: 2019-05-01 | End: 2019-05-11

## 2019-05-01 NOTE — PROGRESS NOTES
[address]  [address]  Ph:   [phone]  Fax: [phone]    Provider:  DHIRAJ Saul CNP    Patient:  Edgardo Garsia  YOB: 1960      Visit Date:  5/1/2019     Reason For Visit:   Chief Complaint   Patient presents with    Dizziness     on and off for 1 week. worsened on monday. has vomiting early morning tuesday.  Pain     back of head. x2 days. neck is hurting as well. aching currently. when the pt lays down it becomes a stabbing pain. laying down makes it worse.  Cough     started on friday.  Pharyngitis     started on thursday and lasted a few days. no longer hurting    Other     pt states she hasn't eaten. did eat a few crackers last night.  Headache     left eye hurts. Solo Hooper is a 66 y.o. female who comes today to the office for  dizziness. She has had dizziness intermittently for a week, but Monday it got worse. The is provoked by moving her head. She also developed pain in the pack of her head and neck. The pain is stabbing in nature when she lays down; it doesn't matter if she lays on her back or her side. About 2 weeks ago, she was reading a magazine on the cough with her neck propped up, and the achiness started after that. She developed vomiting through the night Monday/Tues. She fell yesterday afternoon while trying to go to the bathroom because she was dizzy. Her vision is blurry at times, but that has been about for 6 months, and she thinks she needs her eyes checked. She denies numbness or tingling in the extremities. Previous imagining of the neck in June 2017 showed a normal cervical spine. She has a history of vertigo, and tried the exercises she has used for it in the past on Monday and Tuesday but they did not help at all. She had a cold late last week, with a cough, sinus congestion, sore throat and low grade fevers.   The sore throat and congestion have improved, but she still has a cough, which has been productive of sputum, first clear, then milky white, now pale yellow. She did take some generic Mucinex DM over the weekend, which helped. She states she has been doing well, taking her medications as prescribed. She denies any side effects from her medications. Significant Past Medical History:    Past Medical History:   Diagnosis Date    Hyperlipidemia 12/2013    Doris Tran    Migraine 1998    TMJ (dislocation of temporomandibular joint) 10/12/2016    ED visit           Allergies:  is allergic to emetrol. Medications:   Current Outpatient Medications   Medication Sig Dispense Refill    meclizine (ANTIVERT) 25 MG tablet Take 1 tablet by mouth 3 times daily as needed for Dizziness or Nausea 30 tablet 0    cetirizine (ZYRTEC) 10 MG tablet Take 1 tablet by mouth daily 30 tablet 0    Probiotic Product (PROBIOTIC ADVANCED PO) Take by mouth      lovastatin (MEVACOR) 20 MG tablet TAKE ONE TABLET BY MOUTH NIGHTLY 90 tablet 1    GARLIC PO Take 1 tablet by mouth daily.  Coenzyme Q10 (CO Q 10) 100 MG CAPS Take 1 capsule by mouth daily.  Multiple Vitamins-Minerals (THERAPEUTIC MULTIVITAMIN-MINERALS) tablet Take 1 tablet by mouth daily.  aspirin 81 MG EC tablet Take 1 tablet by mouth daily. 30 tablet 3     No current facility-administered medications for this visit. Review of systems:  Review of Systems - History obtained from the patient  General ROS: Positive for - chills and fever, fatigue  Psychological ROS: negative for - anxiety, depression or sleep disturbances  ENT ROS: Positive for - headaches, sinus congestion  Respiratory ROS: Positive for - cough with pale yelllow sputum. Cardiovascular ROS: negative for - chest pain or edema  Gastrointestinal ROS: Positive for -  nausea/vomiting  Musculoskeletal ROS: Positive for - neck and posterior head pain. Neurological ROS: Positive for - dizziness, imbalance.    Dermatological ROS: negative for - rash    Physical Examination:  /63 (Site: Right Upper Arm)   Pulse 71   Temp 98 °F (36.7 °C) (Oral)   Resp 12   Ht 5' 6\" (1.676 m)   Wt 123 lb 9.6 oz (56.1 kg)   BMI 19.95 kg/m²     General-  Alert and oriented x 3, NAD. Tearful. Gait is unsteady. HEENT: NC, AT, PERRLA, EOMI, anicteric sclerae  Ears: Normal tympanic membrane on the right. Left has cerumen. Nose: patent, no lesions  Mouth: no lesions, moist mucosas  Neck - supple, no significant adenopathy  Chest - clear to auscultation, no wheezes, rales or rhonchi, symmetric air entry  Heart - normal rate, regular rhythm, normal S1, S2, no murmurs, rubs, clicks or gallops  Abdomen - soft, nontender, nondistended, no masses or organomegaly  Extremities - peripheral pulses normal, no pedal edema, no clubbing or cyanosis  Neuro -  Positive Romberg. Strength is equal and strong. Impression:   Diagnosis Orders   1. Acute viral syndrome  CBC Auto Differential    Basic Metabolic Panel    cetirizine (ZYRTEC) 10 MG tablet   2. Vertigo  meclizine (ANTIVERT) 25 MG tablet    CBC Auto Differential    Basic Metabolic Panel    cetirizine (ZYRTEC) 10 MG tablet   3. Acute middle ear effusion, right  cetirizine (ZYRTEC) 10 MG tablet       Plan:  Orders Placed This Encounter   Procedures    CBC Auto Differential     Standing Status:   Future     Standing Expiration Date:   4/30/2020    Basic Metabolic Panel     Standing Status:   Future     Standing Expiration Date:   5/1/2020     Orders Placed This Encounter   Medications    meclizine (ANTIVERT) 25 MG tablet     Sig: Take 1 tablet by mouth 3 times daily as needed for Dizziness or Nausea     Dispense:  30 tablet     Refill:  0    cetirizine (ZYRTEC) 10 MG tablet     Sig: Take 1 tablet by mouth daily     Dispense:  30 tablet     Refill:  0     CBC and BMP today  Meclizine 25 mg 1 tablet PO 3 times daily as needed for dizziness/nausea, for up to 10 days  Cetirizine 10 mg 1 tablet daily.  (may cut in half if makes you jittery)  Will not irrigate ears today for fear of making dizziness/nausea worse. Can apply 1-2 drops of baby oil or mineral oil to left ear to loosen wax. Vertigo exercises as tolerated. Count to 5 with position changes to decrease risk of increased dizziness and fall  Ibuprofen per package directions around the clock, with food, for 2-3 days  Citrus, soft diet. Go to ER for weakness, numbness/tingling on one side, changes in vision or speech, inability to keep down fluids, dark urine or decreased amount., OR if you fall again with injury. Keep cell phone with you, in a pocket, at all times. Ask your kids to check on you a couple of times per day until you feel better. Follow Up:  Return in about 6 days (around 5/7/2019), (earlier if symptoms worsen or fail to improve) for follow up already scheduled with Dr Ria Power.     Tyler Ge, APRN - CNP

## 2019-05-01 NOTE — PATIENT INSTRUCTIONS
You may receive a survey about your visit with us today. The feedback from our patients helps us identify what is working well and where the service to all patients can be enhanced. Thank you! Meclizine 25 mg 1 tablet PO 3 times daily as needed for dizziness/nausea, for up to 10 days  Cetirizine 10 mg 1 tablet daily. (may cut in half if makes you jittery)  [place], soft diet. Ibuprofen per package directions around the clock, with food, for 2-3 days  Vertigo exercises as tolerated. Count to 5 with position change  Can apply 1-2 drops of baby oil or mineral oil to left ear to loosen wax. Go to ER for weakness, numbness/tingling on one side, changes in vision or speech, inability to keep down fluids, dark urine or decreased amount., OR if you fall again with injury. Patient Education        Dizziness: Care Instructions  Your Care Instructions  Dizziness is the feeling of unsteadiness or fuzziness in your head. It is different than having vertigo, which is a feeling that the room is spinning or that you are moving or falling. It is also different from lightheadedness, which is the feeling that you are about to faint. It can be hard to know what causes dizziness. Some people feel dizzy when they have migraine headaches. Sometimes bouts of flu can make you feel dizzy. Some medical conditions, such as heart problems or high blood pressure, can make you feel dizzy. Many medicines can cause dizziness, including medicines for high blood pressure, pain, or anxiety. If a medicine causes your symptoms, your doctor may recommend that you stop or change the medicine. If it is a problem with your heart, you may need medicine to help your heart work better. If there is no clear reason for your symptoms, your doctor may suggest watching and waiting for a while to see if the dizziness goes away on its own. Follow-up care is a key part of your treatment and safety.  Be sure to make and go to all appointments, and call your doctor if you are having problems. It's also a good idea to know your test results and keep a list of the medicines you take. How can you care for yourself at home? · If your doctor recommends or prescribes medicine, take it exactly as directed. Call your doctor if you think you are having a problem with your medicine. · Do not drive while you feel dizzy. · Try to prevent falls. Steps you can take include:  ? Using nonskid mats, adding grab bars near the tub, and using night-lights. ? Clearing your home so that walkways are free of anything you might trip on.  ? Letting family and friends know that you have been feeling dizzy. This will help them know how to help you. When should you call for help? Call 911 anytime you think you may need emergency care. For example, call if:    · You passed out (lost consciousness).     · You have dizziness along with symptoms of a heart attack. These may include:  ? Chest pain or pressure, or a strange feeling in the chest.  ? Sweating. ? Shortness of breath. ? Nausea or vomiting. ? Pain, pressure, or a strange feeling in the back, neck, jaw, or upper belly or in one or both shoulders or arms. ? Lightheadedness or sudden weakness. ? A fast or irregular heartbeat.     · You have symptoms of a stroke. These may include:  ? Sudden numbness, tingling, weakness, or loss of movement in your face, arm, or leg, especially on only one side of your body. ? Sudden vision changes. ? Sudden trouble speaking. ? Sudden confusion or trouble understanding simple statements. ? Sudden problems with walking or balance.   ? A sudden, severe headache that is different from past headaches.    Call your doctor now or seek immediate medical care if:    · You feel dizzy and have a fever, headache, or ringing in your ears.     · You have new or increased nausea and vomiting.     · Your dizziness does not go away or comes back.    Watch closely for changes in your health, and be sure to contact your doctor if:    · You do not get better as expected. Where can you learn more? Go to https://chpepiceweb.healthMavizon. org and sign in to your Varsity [employer]ics account. Enter F426 in the [facility] box to learn more about \"Dizziness: Care Instructions. \"     If you do not have an account, please click on the \"Sign Up Now\" link. Current as of: September 23, 2018  Content Version: 11.9  © 5200-1954 Parakey, SoFits.Me. Care instructions adapted under license by [facility] ([facility]). If you have questions about a medical condition or this instruction, always ask your healthcare professional. [place] [address] any warranty or liability for your use of this information. Patient Education        Epley Maneuver at Home for Vertigo: Exercises  Your Care Instructions  Vertigo is a spinning or whirling sensation when you move your head. Your doctor may have moved you in different positions to help your vertigo get better faster. This is called the Epley maneuver. Your doctor also may have asked you to do these exercises at home. Do the exercises as often as your doctor recommends. If your vertigo is getting worse, your doctor may have you change the exercise or stop it. Step 1  Step 1    1. Sit on the edge of a bed or sofa. Step 2    1. Turn your head 45 degrees in the direction your doctor told you to. This should be toward the ear that causes the most vertigo for you. In this picture, the woman is turning toward her left ear. Step 3    1. Tilt yourself backward until you are lying on your back. Your head should still be at a 45-degree turn. Your head should be about midway between looking straight ahead and looking out to your side. Hold for 30 seconds. If you have vertigo, stay in this position until it stops. Step 4    1. Turn your head 90 degrees toward the ear that has the least vertigo.  In this picture, the woman is turning to the right because she has vertigo on her left side. The point of your chin should be raised and over your shoulder. Hold for 30 seconds. Step 5    1. Roll onto the side with the least vertigo. You should now be looking at the floor. Hold for 30 seconds. Follow-up care is a key part of your treatment and safety. Be sure to make and go to all appointments, and call your doctor if you are having problems. It's also a good idea to know your test results and keep a list of the medicines you take. Where can you learn more? Go to https://The Bucket BBQpepiceweb.TE2. org and sign in to your [employer]Plus account. Enter R313 in the [employer] box to learn more about \"Epley Maneuver at Home for Vertigo: Exercises. \"     If you do not have an account, please click on the \"Sign Up Now\" link. Current as of: Dyan 3, 2018  Content Version: 11.9  © 4827-6670 Threadflip. Care instructions adapted under license by [facility] ([facility]). If you have questions about a medical condition or this instruction, always ask your healthcare professional. [place] [address] any warranty or liability for your use of this information. Patient Education        Lightheadedness or Faintness: Care Instructions  Your Care Instructions  Lightheadedness is a feeling that you are about to faint or \"pass out. \" You do not feel as if you or your surroundings are moving. It is different from vertigo, which is the feeling that you or things around you are spinning or tilting. Lightheadedness usually goes away or gets better when you lie down. If lightheadedness gets worse, it can lead to a fainting spell. It is common to feel lightheaded from time to time. Lightheadedness usually is not caused by a serious problem. It often is caused by a short-lasting drop in blood pressure and blood flow to your head that occurs when you get up too quickly from a seated or lying position. Follow-up care is a key part of your treatment and safety.  Be sure to make and go to all appointments, and call your doctor if you are having problems. It's also a good idea to know your test results and keep a list of the medicines you take. How can you care for yourself at home? · Lie down for 1 or 2 minutes when you feel lightheaded. After lying down, sit up slowly and remain sitting for 1 to 2 minutes before slowly standing up. · Avoid movements, positions, or activities that have made you lightheaded in the past.  · Get plenty of rest, especially if you have a cold or flu, which can cause lightheadedness. · Make sure you drink plenty of fluids, especially if you have a fever or have been sweating. · Do not drive or put yourself and others in danger while you feel lightheaded. When should you call for help? Call 911 anytime you think you may need emergency care. For example, call if:    · You have symptoms of a stroke. These may include:  ? Sudden numbness, tingling, weakness, or loss of movement in your face, arm, or leg, especially on only one side of your body. ? Sudden vision changes. ? Sudden trouble speaking. ? Sudden confusion or trouble understanding simple statements. ? Sudden problems with walking or balance. ? A sudden, severe headache that is different from past headaches.     · You have symptoms of a heart attack. These may include:  ? Chest pain or pressure, or a strange feeling in the chest.  ? Sweating. ? Shortness of breath. ? Nausea or vomiting. ? Pain, pressure, or a strange feeling in the back, neck, jaw, or upper belly or in one or both shoulders or arms. ? Lightheadedness or sudden weakness. ? A fast or irregular heartbeat. After you call 911, the  may tell you to chew 1 adult-strength or 2 to 4 low-dose aspirin. Wait for an ambulance. Do not try to drive yourself.    Watch closely for changes in your health, and be sure to contact your doctor if:    · Your lightheadedness gets worse or does not get better with home care.    Where can you learn more? Go to https://chpepiceweb.FlexyMind. org and sign in to your Wix account. Enter I039 in the "[employer]hire box to learn more about \"Lightheadedness or Faintness: Care Instructions. \"     If you do not have an account, please click on the \"Sign Up Now\" link. Current as of: September 23, 2018  Content Version: 11.9  © 1631-4171 Acusphere. Care instructions adapted under license by [facility] ([facility]). If you have questions about a medical condition or this instruction, always ask your healthcare professional. [place] [address] any warranty or liability for your use of this information. Patient Education        Vertigo: Care Instructions  Your Care Instructions    Vertigo is the feeling that you or your surroundings are moving when there is no actual movement. It is often described as a feeling of spinning, whirling, falling, or tilting. Vertigo may make you vomit or feel nauseated. You may have trouble standing or walking and may lose your balance. Vertigo is often related to an inner ear problem, but it can have other more serious causes. If vertigo continues, you may need more tests to find its cause. Follow-up care is a key part of your treatment and safety. Be sure to make and go to all appointments, and call your doctor if you are having problems. It's also a good idea to know your test results and keep a list of the medicines you take. How can you care for yourself at home? · Do not lie flat on your back. Prop yourself up slightly. This may reduce the spinning feeling. Keep your eyes open. · Move slowly so that you do not fall. · If your doctor recommends medicine, take it exactly as directed. · Do not drive while you are having vertigo. Certain exercises, called [place] exercises, can help decrease vertigo.  To do [place] exercises:  · Sit on the edge of a bed or sofa and quickly lie down on the side that causes the worst vertigo. Lie on your side with your ear down. · Stay in this position for at least 30 seconds or until the vertigo goes away. · Sit up. If this causes vertigo, wait for it to stop. · Repeat the procedure on the other side. · Repeat this 10 times. Do these exercises 2 times a day until the vertigo is gone. When should you call for help? Call 911 anytime you think you may need emergency care. For example, call if:    · You passed out (lost consciousness).     · You have symptoms of a stroke. These may include:  ? Sudden numbness, tingling, weakness, or loss of movement in your face, arm, or leg, especially on only one side of your body. ? Sudden vision changes. ? Sudden trouble speaking. ? Sudden confusion or trouble understanding simple statements. ? Sudden problems with walking or balance. ? A sudden, severe headache that is different from past headaches.    Call your doctor now or seek immediate medical care if:    · Vertigo occurs with a fever, a headache, or ringing in your ears.     · You have new or increased nausea and vomiting.    Watch closely for changes in your health, and be sure to contact your doctor if:    · Vertigo gets worse or happens more often.     · Vertigo has not gotten better after 2 weeks. Where can you learn more? Go to https://ASC Information Technology.Medicalis. org and sign in to your [employer] account. Enter K570 in the [facility] box to learn more about \"Vertigo: Care Instructions. \"     If you do not have an account, please click on the \"Sign Up Now\" link. Current as of: March 27, 2018  Content Version: 11.9  © 8744-9164 G2 Web Services, Incorporated. Care instructions adapted under license by [facility] ([facility]). If you have questions about a medical condition or this instruction, always ask your healthcare professional. [place] [address] any warranty or liability for your use of this information.

## 2019-05-02 ENCOUNTER — TELEPHONE (OUTPATIENT)
Dept: FAMILY MEDICINE CLINIC | Age: 59
End: 2019-05-02

## 2019-05-02 DIAGNOSIS — D72.819 LEUKOPENIA, UNSPECIFIED TYPE: Primary | ICD-10-CM

## 2019-05-02 NOTE — TELEPHONE ENCOUNTER
Called pt and advised. She voiced understanding, stating she has been resting today. CBC sent to home address.

## 2019-05-02 NOTE — TELEPHONE ENCOUNTER
----- Message from DHIRAJ Saul CNP sent at 5/2/2019 11:12 AM EDT -----  Her electrolytes and kidney function are normal. Her WBC are a little low, which may interfere with her ability to fight infection. Make sure to practice good handwashing, and avoid others who may be sick. We will need to recheck a CBC in 6 weeks to reassess that value. She is not anemic.

## 2019-05-07 ENCOUNTER — OFFICE VISIT (OUTPATIENT)
Dept: FAMILY MEDICINE CLINIC | Age: 59
End: 2019-05-07
Payer: MEDICAID

## 2019-05-07 VITALS
DIASTOLIC BLOOD PRESSURE: 58 MMHG | RESPIRATION RATE: 12 BRPM | BODY MASS INDEX: 20.57 KG/M2 | SYSTOLIC BLOOD PRESSURE: 121 MMHG | HEIGHT: 66 IN | HEART RATE: 64 BPM | WEIGHT: 128 LBS | TEMPERATURE: 98.1 F

## 2019-05-07 DIAGNOSIS — E78.00 HYPERCHOLESTEROLEMIA: ICD-10-CM

## 2019-05-07 DIAGNOSIS — G43.909 MIGRAINE WITHOUT STATUS MIGRAINOSUS, NOT INTRACTABLE, UNSPECIFIED MIGRAINE TYPE: ICD-10-CM

## 2019-05-07 DIAGNOSIS — M72.2 PLANTAR FASCIITIS OF RIGHT FOOT: Primary | ICD-10-CM

## 2019-05-07 PROCEDURE — 1036F TOBACCO NON-USER: CPT | Performed by: FAMILY MEDICINE

## 2019-05-07 PROCEDURE — G8427 DOCREV CUR MEDS BY ELIG CLIN: HCPCS | Performed by: FAMILY MEDICINE

## 2019-05-07 PROCEDURE — 3017F COLORECTAL CA SCREEN DOC REV: CPT | Performed by: FAMILY MEDICINE

## 2019-05-07 PROCEDURE — G8420 CALC BMI NORM PARAMETERS: HCPCS | Performed by: FAMILY MEDICINE

## 2019-05-07 PROCEDURE — 99214 OFFICE O/P EST MOD 30 MIN: CPT | Performed by: FAMILY MEDICINE

## 2019-05-07 RX ORDER — LOVASTATIN 20 MG/1
TABLET ORAL
Qty: 90 TABLET | Refills: 1 | Status: SHIPPED | OUTPATIENT
Start: 2019-05-07 | End: 2019-12-17 | Stop reason: SDUPTHER

## 2019-05-07 RX ORDER — LOVASTATIN 20 MG/1
TABLET ORAL
Qty: 90 TABLET | Refills: 1 | Status: SHIPPED | OUTPATIENT
Start: 2019-05-07 | End: 2019-05-07 | Stop reason: SDUPTHER

## 2019-05-07 NOTE — PROGRESS NOTES
[address] [place]  [address] [place], [address]  Ph:   [phone]  Fax: [phone]    Provider:  Dr. Camille Gutierrez    Patient:  Dottie Kern  YOB: 1960      Visit Date:  5/7/2019     Reason For Visit:   Chief Complaint   Patient presents with    Follow-up     2 month follow up    Hyperlipidemia        Sarika Carter is a 66 y.o. female who comes today to the office for follow up of right heel pain, hypercholesterolemia and migraines. She needs refills on her medications. Last week she came to see Kam Galindo CNP for acute viral syndrome with vertigo. She has done well and all her symptoms have resolved by now. Plantar fasciitis:  started in August 2018 without any specific trigger. She was given exercises and antiinflammatories. The pain got better for a while but it kept coming back. Now, she is wearing new tennis shoes which helps with the pain. They have inserts in it and the pain is not coming back. She continues exercising. She puts them on as soon as she wakes up. Hyperlipidemia:   She is taking Lovastatin 20 mg 1 tablet PO daily and Coenzyme Q 10 200 mg 1 capsule PO daily. She also takes Garlic OTC. She denies any side effects from the medicines. Last labs work done in October 2018 at a health fair showed a total cholesterol of 180, HDL 68, LDL 96, Triglycerides 78. There is a family history of hypercholesterolemia in her mother who had early ischemic heart disease (mild heart attack at age 59). She is adherent to low cholesterol diet. She is exercising regularly, she is walking 6 x week for about an hour and she is going up and down a flight of stairs. She is doing about 66972 steps a day.       Migraines:   She still has migraines on average once a month. They are mild and Excedrin migraine takes care of it. They started after she had a head injury at age 30. They are associated to photo and phonophobia.       Significant Past Medical History: Past Medical History:   Diagnosis Date    Hyperlipidemia 12/2013    Daniel Chill    Migraine 1998    TMJ (dislocation of temporomandibular joint) 10/12/2016    ED visit           Allergies:  is allergic to emetrol. Medications:   Current Outpatient Medications   Medication Sig Dispense Refill    lovastatin (MEVACOR) 20 MG tablet TAKE ONE TABLET BY MOUTH NIGHTLY 90 tablet 1    meclizine (ANTIVERT) 25 MG tablet Take 1 tablet by mouth 3 times daily as needed for Dizziness or Nausea 30 tablet 0    cetirizine (ZYRTEC) 10 MG tablet Take 1 tablet by mouth daily 30 tablet 0    GARLIC PO Take 1 tablet by mouth daily.  Coenzyme Q10 (CO Q 10) 100 MG CAPS Take 1 capsule by mouth daily.  Multiple Vitamins-Minerals (THERAPEUTIC MULTIVITAMIN-MINERALS) tablet Take 1 tablet by mouth daily.  aspirin 81 MG EC tablet Take 1 tablet by mouth daily. 30 tablet 3     No current facility-administered medications for this visit. Review of systems:  Review of Systems - History obtained from chart review and the patient  General ROS: negative for - chills, fatigue or fever  ENT ROS: positive for - headaches, off and on.   Negative nasal congestion or nasal discharge  Respiratory ROS: negative for - cough,  shortness of breath or wheezing  Cardiovascular ROS: negative for - chest pain or edema  Gastrointestinal ROS: negative for - abdominal pain, constipation, diarrhea or nausea/vomiting  Musculoskeletal ROS: negative for - right foot pain  Dermatological ROS: negative for - rash    Physical Examination:  BP (!) 121/58 (Site: Left Upper Arm, Position: Sitting, Cuff Size: Large Adult)   Pulse 64   Temp 98.1 °F (36.7 °C) (Oral)   Resp 12   Ht 5' 6\" (1.676 m)   Wt 128 lb (58.1 kg)   BMI 20.66 kg/m²     General-  Alert and oriented x 3, NAD  HEENT: NC, AT, PERRLA, EOMI, anicteric sclerae  Ears: Normal tympanic membranes bilaterally  Nose: patent, no lesions  Mouth: no lesions, moist mucosas  Neck - supple, no

## 2019-05-14 ENCOUNTER — TELEPHONE (OUTPATIENT)
Dept: FAMILY MEDICINE CLINIC | Age: 59
End: 2019-05-14

## 2019-05-14 ENCOUNTER — NURSE ONLY (OUTPATIENT)
Dept: FAMILY MEDICINE CLINIC | Age: 59
End: 2019-05-14
Payer: MEDICAID

## 2019-05-14 DIAGNOSIS — E78.00 HYPERCHOLESTEROLEMIA: ICD-10-CM

## 2019-05-14 LAB
ALBUMIN SERPL-MCNC: 4.1 G/DL (ref 3.5–5.1)
ALP BLD-CCNC: 94 U/L (ref 38–126)
ALT SERPL-CCNC: 17 U/L (ref 11–66)
AST SERPL-CCNC: 20 U/L (ref 5–40)
BILIRUB SERPL-MCNC: 0.4 MG/DL (ref 0.3–1.2)
BILIRUBIN DIRECT: < 0.2 MG/DL (ref 0–0.3)
CHOLESTEROL, TOTAL: 176 MG/DL (ref 100–199)
HDLC SERPL-MCNC: 67 MG/DL
LDL CHOLESTEROL CALCULATED: 93 MG/DL
TOTAL PROTEIN: 7.1 G/DL (ref 6.1–8)
TRIGL SERPL-MCNC: 80 MG/DL (ref 0–199)

## 2019-05-14 PROCEDURE — 36415 COLL VENOUS BLD VENIPUNCTURE: CPT | Performed by: FAMILY MEDICINE

## 2019-05-14 NOTE — TELEPHONE ENCOUNTER
----- Message from DHIRAJ Arthur CNP sent at 5/14/2019  1:06 PM EDT -----  Labs are within acceptable ranges. Lipid panel similar to previous check. Continue with medications and recommendations per Dr Joshua Tellez from last visit.

## 2019-08-20 ENCOUNTER — OFFICE VISIT (OUTPATIENT)
Dept: FAMILY MEDICINE CLINIC | Age: 59
End: 2019-08-20
Payer: MEDICAID

## 2019-08-20 VITALS
HEART RATE: 65 BPM | HEIGHT: 66 IN | WEIGHT: 122.4 LBS | BODY MASS INDEX: 19.67 KG/M2 | SYSTOLIC BLOOD PRESSURE: 134 MMHG | RESPIRATION RATE: 10 BRPM | TEMPERATURE: 98 F | DIASTOLIC BLOOD PRESSURE: 58 MMHG

## 2019-08-20 DIAGNOSIS — Z12.31 ENCOUNTER FOR SCREENING MAMMOGRAM FOR BREAST CANCER: ICD-10-CM

## 2019-08-20 DIAGNOSIS — Z13.1 DIABETES MELLITUS SCREENING: ICD-10-CM

## 2019-08-20 DIAGNOSIS — Z00.00 WELL ADULT EXAM: Primary | ICD-10-CM

## 2019-08-20 PROCEDURE — 90472 IMMUNIZATION ADMIN EACH ADD: CPT | Performed by: FAMILY MEDICINE

## 2019-08-20 PROCEDURE — 90670 PCV13 VACCINE IM: CPT | Performed by: FAMILY MEDICINE

## 2019-08-20 PROCEDURE — 90471 IMMUNIZATION ADMIN: CPT | Performed by: FAMILY MEDICINE

## 2019-08-20 PROCEDURE — 99396 PREV VISIT EST AGE 40-64: CPT | Performed by: FAMILY MEDICINE

## 2019-08-20 PROCEDURE — 90750 HZV VACC RECOMBINANT IM: CPT | Performed by: FAMILY MEDICINE

## 2019-08-20 ASSESSMENT — ENCOUNTER SYMPTOMS
GASTROINTESTINAL NEGATIVE: 1
ALLERGIC/IMMUNOLOGIC NEGATIVE: 1
EYES NEGATIVE: 1
RESPIRATORY NEGATIVE: 1

## 2019-08-20 NOTE — PROGRESS NOTES
Immunizations Administered     Name Date Dose Route    Pneumococcal Conjugate 13-valent (Ptdsvww41) 8/20/2019 0.5 mL Intramuscular    Site: Deltoid- Left    Lot: [id number]    NDC: 3616-2880-10    Zoster Recombinant (Shingrix) 8/20/2019 0.5 mL Intramuscular    Site: Deltoid- Right    Lot: [id number]    NDC: 20961-228-59

## 2019-08-20 NOTE — PROGRESS NOTES
Lifestyle    Physical activity:     Days per week: Not on file     Minutes per session: Not on file    Stress: Not on file   Relationships    Social connections:     Talks on phone: Not on file     Gets together: Not on file     Attends Confucianism service: Not on file     Active member of club or organization: Not on file     Attends meetings of clubs or organizations: Not on file     Relationship status: Not on file    Intimate partner violence:     Fear of current or ex partner: Not on file     Emotionally abused: Not on file     Physically abused: Not on file     Forced sexual activity: Not on file   Other Topics Concern    Not on file   Social History Narrative    Not on file       Allergies   Allergen Reactions    Sarah Leon has a current medication list which includes the following prescription(s): lovastatin, garlic, co q 10, therapeutic multivitamin-minerals, and aspirin. Objective:  Blood pressure (!) 134/58, pulse 65, temperature 98 °F (36.7 °C), resp. rate 10, height 5' 6\" (1.676 m), weight 122 lb 6.4 oz (55.5 kg), not currently breastfeeding.     Physical Examination:   General Appearance - alert, well appearing, and in no distress and oriented to person, place, and time  Eyes - pupils equal and reactive, extraocular eye movements intact, sclera anicteric  Ears - bilateral TM's and external ear canals normal, hearing grossly normal bilaterally  Nose - normal and patent, no erythema, discharge or polyps  Mouth - mucous membranes moist, pharynx normal without lesions  Neck - supple, no significant adenopathy  Lymphatics - no palpable lymphadenopathy  Chest - clear to auscultation, no wheezes, rales or rhonchi, symmetric air entry  Heart - normal rate, regular rhythm, normal S1, S2, no murmurs, rubs, clicks or gallops  Abdomen - soft, nontender, nondistended, no masses or organomegaly  Neurological -  oriented, normal speech, no focal findings or movement disorder noted  Extremities - peripheral pulses normal, no pedal edema, no clubbing or cyanosis  Skin - normal coloration and turgor, no rashes, no suspicious skin lesions noted    Assessment:   Diagnosis Orders   1. Well adult exam     2. Diabetes mellitus screening  Glucose, Fasting   3. Encounter for screening mammogram for breast cancer  [facility] DIGITAL SCREEN W CAD BILATERAL       Plan:    Return in about 4 months (around 12/20/2019). .    Counseling was provided today regarding the following topics:  Healthy eating habits and snacks, talking or texting while driving, vitamin/Mineral supplementation, vitamin D and calcium intake, regular exercise, and breast self exam. Written information has been provided.          Saranya Navarro MD

## 2019-08-20 NOTE — PATIENT INSTRUCTIONS
more dangerous to your health than receiving this vaccine. However, like any medicine, this vaccine can cause side effects but the risk of serious side effects is extremely low. Be sure to keep your child on a regular schedule for other immunizations against diseases such as diphtheria, tetanus, pertussis (whooping cough), measles, mumps, hepatitis, or varicella (chicken pox). Your doctor or state health department can provide you with a recommended immunization schedule. What is pneumococcal 13-valent conjugate vaccine? Pneumococcal disease is a serious infection caused by a bacteria. Pneumococcal bacteria can infect the sinuses and inner ear. It can also infect the lungs, blood, and brain, and these conditions can be fatal.  Pneumococcal 13-valent vaccine is used to prevent infection caused by pneumococcal bacteria. This vaccine contains 13 different types of pneumococcal bacteria. Pneumococcal 13-valent vaccine works by exposing you to a small amount of the bacteria or a protein from the bacteria, which causes the body to develop immunity to the disease. This vaccine will not treat an active infection that has already developed in the body. Pneumococcal 13-valent vaccine is for use in children from 6 weeks to 11years old, and in adults who are 48 and older. Becoming infected with pneumococcal disease (such as pneumonia or meningitis) is much more dangerous to your health than receiving this vaccine. However, like any medicine, this vaccine can cause side effects but the risk of serious side effects is extremely low. Like any vaccine, pneumococcal 13-valent vaccine may not provide protection from disease in every person. What should I discuss with my healthcare provider before receiving this vaccine? Keep track of any and all side effects your child has after receiving this vaccine. When the child receives a booster dose, you will need to tell the doctor if the previous shot caused any side effects.   You state you live in. Your doctor may recommend treating fever and pain with an aspirin-free pain reliever such as acetaminophen (Tylenol) or ibuprofen (Motrin, Advil, and others) when the shot is given and for the next 24 hours. Follow the label directions or your doctor's instructions about how much of this medicine to give your child. It is especially important to prevent fever from occurring in a child who has a seizure disorder such as epilepsy. Be sure to keep your child on a regular schedule for other immunizations such as diphtheria, tetanus, pertussis (whooping cough), hepatitis, and varicella (chicken pox). Your doctor or state health department can provide you with a recommended immunization schedule. What happens if I miss a dose? Contact your doctor if your child will miss a booster dose or gets behind schedule. The next dose should be given as soon as possible. There is no need to start over. Be sure your child receives all recommended doses of this vaccine. If your child does not receive the full series of vaccines, he or she may not be fully protected against the disease. What happens if I overdose? An overdose of this vaccine is unlikely to occur. What should I avoid before or after receiving this vaccine? Follow your doctor's instructions about any restrictions on food, beverages, or activity. What are the possible side effects of this vaccine? Your child should not receive a booster vaccine if he or she had a life-threatening allergic reaction after the first shot. Keep track of any and all side effects your child has after receiving this vaccine. When the child receives a booster dose, you will need to tell the doctor if the previous shot caused any side effects. Get emergency medical help if your child has any of these signs of an allergic reaction: hives; difficulty breathing; swelling of the face, lips, tongue, or throat.   Call your doctor at once if you or your child has a serious are taking, check with your doctor, nurse or pharmacist.  Copyright 3366-4590 [address]: 4.01. Revision date: 1/16/2012. Care instructions adapted under license by [facility] ([facility]). If you have questions about a medical condition or this instruction, always ask your healthcare professional. [address] any warranty or liability for your use of this information. Patient Education        Recombinant Zoster (Shingles) Vaccine, RZV: What you need to know  Why get vaccinated? Shingles (also called herpes zoster, or just zoster) is a painful skin rash, often with blisters. Shingles is caused by the varicella zoster virus, the same virus that causes chickenpox. After you have chickenpox, the virus stays in your body and can cause shingles later in life. You can't catch shingles from another person. However, a person who has never had chickenpox (or chickenpox vaccine) could get chickenpox from someone with shingles. A shingles rash usually appears on one side of the face or body and heals within 2 to 4 weeks. Its main symptom is pain, which can be severe. Other symptoms can include fever, headache, chills, and upset stomach. Very rarely, a shingles infection can lead to pneumonia, hearing problems, blindness, brain inflammation (encephalitis), or death. For about 1 person in 5, severe pain can continue even long after the rash has cleared up. This long-lasting pain is called post-herpetic neuralgia (PHN). Shingles is far more common in people 48years of age and older than in younger people, and the risk increases with age. It is also more common in people whose immune system is weakened because of a disease such as cancer, or by drugs such as steroids or chemotherapy. At least 1 million people a year in the [facility] get shingles. Shingles vaccine (recombinant)  Recombinant shingles vaccine was approved by FDA in 2017 for the prevention of shingles.  In clinical trials,

## 2019-08-21 ENCOUNTER — NURSE ONLY (OUTPATIENT)
Dept: FAMILY MEDICINE CLINIC | Age: 59
End: 2019-08-21
Payer: MEDICAID

## 2019-08-21 DIAGNOSIS — Z13.1 DIABETES MELLITUS SCREENING: ICD-10-CM

## 2019-08-21 LAB — GLUCOSE FASTING: 100 MG/DL (ref 70–108)

## 2019-08-21 PROCEDURE — 36415 COLL VENOUS BLD VENIPUNCTURE: CPT | Performed by: FAMILY MEDICINE

## 2019-09-11 ENCOUNTER — HOSPITAL ENCOUNTER (OUTPATIENT)
Dept: WOMENS IMAGING | Age: 59
Discharge: HOME | End: 2019-09-11
Payer: MEDICAID

## 2019-09-11 DIAGNOSIS — Z12.31 ENCOUNTER FOR SCREENING MAMMOGRAM FOR BREAST CANCER: ICD-10-CM

## 2019-09-11 PROCEDURE — 77063 BREAST TOMOSYNTHESIS BI: CPT

## 2019-09-13 ENCOUNTER — NURSE ONLY (OUTPATIENT)
Dept: FAMILY MEDICINE CLINIC | Age: 59
End: 2019-09-13
Payer: MEDICAID

## 2019-09-13 DIAGNOSIS — Z23 NEED FOR INFLUENZA VACCINATION: ICD-10-CM

## 2019-09-13 PROCEDURE — 90471 IMMUNIZATION ADMIN: CPT | Performed by: FAMILY MEDICINE

## 2019-09-13 PROCEDURE — 90688 IIV4 VACCINE SPLT 0.5 ML IM: CPT | Performed by: FAMILY MEDICINE

## 2019-10-12 LAB
CHOLESTEROL, TOTAL: 169 MG/DL
CHOLESTEROL/HDL RATIO: NORMAL
HDLC SERPL-MCNC: 61 MG/DL (ref 35–70)
LDL CHOLESTEROL CALCULATED: 91 MG/DL (ref 0–160)
TRIGL SERPL-MCNC: 83 MG/DL
VLDLC SERPL CALC-MCNC: NORMAL MG/DL

## 2019-12-17 ENCOUNTER — OFFICE VISIT (OUTPATIENT)
Dept: FAMILY MEDICINE CLINIC | Age: 59
End: 2019-12-17
Payer: MEDICAID

## 2019-12-17 VITALS
TEMPERATURE: 98 F | BODY MASS INDEX: 18.96 KG/M2 | HEIGHT: 66 IN | HEART RATE: 70 BPM | RESPIRATION RATE: 12 BRPM | SYSTOLIC BLOOD PRESSURE: 139 MMHG | DIASTOLIC BLOOD PRESSURE: 66 MMHG | WEIGHT: 118 LBS

## 2019-12-17 DIAGNOSIS — R73.01 ELEVATED FASTING BLOOD SUGAR: ICD-10-CM

## 2019-12-17 DIAGNOSIS — E78.00 HYPERCHOLESTEROLEMIA: Primary | ICD-10-CM

## 2019-12-17 DIAGNOSIS — G43.909 MIGRAINE WITHOUT STATUS MIGRAINOSUS, NOT INTRACTABLE, UNSPECIFIED MIGRAINE TYPE: ICD-10-CM

## 2019-12-17 DIAGNOSIS — R03.0 ELEVATED SYSTOLIC BLOOD PRESSURE READING WITHOUT DIAGNOSIS OF HYPERTENSION: ICD-10-CM

## 2019-12-17 LAB — HBA1C MFR BLD: 5.3 %

## 2019-12-17 PROCEDURE — 3017F COLORECTAL CA SCREEN DOC REV: CPT | Performed by: FAMILY MEDICINE

## 2019-12-17 PROCEDURE — 1036F TOBACCO NON-USER: CPT | Performed by: FAMILY MEDICINE

## 2019-12-17 PROCEDURE — 83036 HEMOGLOBIN GLYCOSYLATED A1C: CPT | Performed by: FAMILY MEDICINE

## 2019-12-17 PROCEDURE — G8427 DOCREV CUR MEDS BY ELIG CLIN: HCPCS | Performed by: FAMILY MEDICINE

## 2019-12-17 PROCEDURE — G8482 FLU IMMUNIZE ORDER/ADMIN: HCPCS | Performed by: FAMILY MEDICINE

## 2019-12-17 PROCEDURE — G8420 CALC BMI NORM PARAMETERS: HCPCS | Performed by: FAMILY MEDICINE

## 2019-12-17 PROCEDURE — 99214 OFFICE O/P EST MOD 30 MIN: CPT | Performed by: FAMILY MEDICINE

## 2019-12-17 RX ORDER — LOVASTATIN 20 MG/1
TABLET ORAL
Qty: 90 TABLET | Refills: 1 | Status: SHIPPED | OUTPATIENT
Start: 2019-12-17 | End: 2020-06-16 | Stop reason: SDUPTHER

## 2020-06-16 ENCOUNTER — OFFICE VISIT (OUTPATIENT)
Dept: FAMILY MEDICINE CLINIC | Age: 60
End: 2020-06-16
Payer: MEDICAID

## 2020-06-16 VITALS
TEMPERATURE: 98.3 F | DIASTOLIC BLOOD PRESSURE: 78 MMHG | RESPIRATION RATE: 16 BRPM | BODY MASS INDEX: 19.22 KG/M2 | SYSTOLIC BLOOD PRESSURE: 137 MMHG | WEIGHT: 119.6 LBS | HEIGHT: 66 IN | HEART RATE: 64 BPM

## 2020-06-16 PROCEDURE — 99214 OFFICE O/P EST MOD 30 MIN: CPT | Performed by: NURSE PRACTITIONER

## 2020-06-16 PROCEDURE — G8420 CALC BMI NORM PARAMETERS: HCPCS | Performed by: NURSE PRACTITIONER

## 2020-06-16 PROCEDURE — 1036F TOBACCO NON-USER: CPT | Performed by: NURSE PRACTITIONER

## 2020-06-16 PROCEDURE — G8427 DOCREV CUR MEDS BY ELIG CLIN: HCPCS | Performed by: NURSE PRACTITIONER

## 2020-06-16 PROCEDURE — 3017F COLORECTAL CA SCREEN DOC REV: CPT | Performed by: NURSE PRACTITIONER

## 2020-06-16 RX ORDER — LOVASTATIN 20 MG/1
TABLET ORAL
Qty: 90 TABLET | Refills: 1 | Status: SHIPPED | OUTPATIENT
Start: 2020-06-16 | End: 2020-12-18 | Stop reason: SDUPTHER

## 2020-06-16 ASSESSMENT — PATIENT HEALTH QUESTIONNAIRE - PHQ9
SUM OF ALL RESPONSES TO PHQ9 QUESTIONS 1 & 2: 0
1. LITTLE INTEREST OR PLEASURE IN DOING THINGS: 0
SUM OF ALL RESPONSES TO PHQ QUESTIONS 1-9: 0
2. FEELING DOWN, DEPRESSED OR HOPELESS: 0
SUM OF ALL RESPONSES TO PHQ QUESTIONS 1-9: 0

## 2020-06-16 NOTE — PROGRESS NOTES
medicine like Excedrin and it helps with the headache. They started after she had a head injury at age 30. Mikey Shakeel are associated to photo and phonophobia. She will have nausea. Layne Jacob has not had a migraine for about 3 months. Memory loss: issues spelling words or forgetting \"silly things\", like dropping something off to her mother. Never lost her keys, describes herself as \"OCD\" and \"creature of habit\". No issues driving, managing finances or appointment scheduling. No history of Alzheimer's in the family. Significant Past Medical History:    Past Medical History:   Diagnosis Date    Hyperlipidemia 12/2013    Ade Livingston    Migraine 1998    TMJ (dislocation of temporomandibular joint) 10/12/2016    ED visit           Allergies:  is allergic to emetrol. Medications:   Current Outpatient Medications   Medication Sig Dispense Refill    lovastatin (MEVACOR) 20 MG tablet TAKE ONE TABLET BY MOUTH NIGHTLY 90 tablet 1    GARLIC PO Take 1 tablet by mouth daily.  Coenzyme Q10 (CO Q 10) 100 MG CAPS Take 1 capsule by mouth daily.  Multiple Vitamins-Minerals (THERAPEUTIC MULTIVITAMIN-MINERALS) tablet Take 1 tablet by mouth daily.  aspirin 81 MG EC tablet Take 1 tablet by mouth daily. 30 tablet 3     No current facility-administered medications for this visit.         Review of systems:  Review of Systems - History obtained from the patient  General ROS: negative for - chills, fatigue or fever  Psychological ROS: negative for - anxiety, depression or sleep disturbances  ENT ROS: negative for - headaches, nasal congestion or nasal discharge  Allergy and Immunology ROS: negative for - seasonal allergies  Hematological and Lymphatic ROS: negative for - bruising  Endocrine ROS: negative for - malaise/lethargy, polydipsia/polyuria or temperature intolerance  Respiratory ROS: negative for - cough,  shortness of breath or wheezing  Cardiovascular ROS: negative for - chest pain or edema  Gastrointestinal

## 2020-06-16 NOTE — PATIENT INSTRUCTIONS
[employer], [facility] disclaims any warranty or liability for your use of this information. Patient Education        Low Back Pain: Exercises  Introduction  Here are some examples of exercises for you to try. The exercises may be suggested for a condition or for rehabilitation. Start each exercise slowly. Ease off the exercises if you start to have pain. You will be told when to start these exercises and which ones will work best for you. How to do the exercises  Press-up   8. Lie on your stomach, supporting your body with your forearms. 9. Press your elbows down into the floor to raise your upper back. As you do this, relax your stomach muscles and allow your back to arch without using your back muscles. As your press up, do not let your hips or pelvis come off the floor. 10. Hold for 15 to 30 seconds, then relax. 11. Repeat 2 to 4 times. Alternate arm and leg (bird dog) exercise   Do this exercise slowly. Try to keep your body straight at all times, and do not let one hip drop lower than the other. 6. Start on the floor, on your hands and knees. 7. Tighten your belly muscles. 8. Raise one leg off the floor, and hold it straight out behind you. Be careful not to let your hip drop down, because that will twist your trunk. 9. Hold for about 6 seconds, then lower your leg and switch to the other leg. 10. Repeat 8 to 12 times on each leg. 11. Over time, work up to holding for 10 to 30 seconds each time. 12. If you feel stable and secure with your leg raised, try raising the opposite arm straight out in front of you at the same time. Knee-to-chest exercise   5. Lie on your back with your knees bent and your feet flat on the floor. 6. Bring one knee to your chest, keeping the other foot flat on the floor (or keeping the other leg straight, whichever feels better on your lower back). 7. Keep your lower back pressed to the floor. Hold for at least 15 to 30 seconds.   8. Relax, and lower the stretch for at least 15 to 30 seconds. Do not arch your back, point your toes, or bend either knee. Keep one heel touching the floor and the other heel touching the wall. 4. Repeat with your other leg. 5. Do 2 to 4 times for each leg. Hip flexor stretch   1. Kneel on the floor with one knee bent and one leg behind you. Place your forward knee over your foot. Keep your other knee touching the floor. 2. Slowly push your hips forward until you feel a stretch in the upper thigh of your rear leg. 3. Hold the stretch for at least 15 to 30 seconds. Repeat with your other leg. 4. Do 2 to 4 times on each side. Wall sit   1. Stand with your back 10 to 12 inches away from a wall. 2. Lean into the wall until your back is flat against it. 3. Slowly slide down until your knees are slightly bent, pressing your lower back into the wall. 4. Hold for about 6 seconds, then slide back up the wall. 5. Repeat 8 to 12 times. Follow-up care is a key part of your treatment and safety. Be sure to make and go to all appointments, and call your doctor if you are having problems. It's also a good idea to know your test results and keep a list of the medicines you take. Where can you learn more? Go to [email or web address].[email or web address] org and sign in to your [employer] account. Enter G843 in the [facility] box to learn more about \"Low Back Pain: Exercises. \"     If you do not have an account, please click on the \"Sign Up Now\" link. Current as of: March 2, 2020               Content Version: 12.5  © 9279-2455 Healthwise, Incorporated. Care instructions adapted under license by [facility] ([facility]). If you have questions about a medical condition or this instruction, always ask your healthcare professional. [place] [address] any warranty or liability for your use of this information.          Patient Education        Low Back Arthritis: Exercises  Introduction  Here are some examples of typical list of the medicines you take. Where can you learn more? Go to https://[email or web address] org and sign in to your [employer] account. Enter Z442 in the [employer] box to learn more about \"Low Back Arthritis: Exercises. \"     If you do not have an account, please click on the \"Sign Up Now\" link. Current as of: March 2, 2020               Content Version: 12.5  © 2006-2020 Healthwise, Incorporated. Care instructions adapted under license by [employer]. If you have questions about a medical condition or this instruction, always ask your healthcare professional. [address] any warranty or liability for your use of this information.

## 2020-06-22 ENCOUNTER — HOSPITAL ENCOUNTER (OUTPATIENT)
Dept: GENERAL RADIOLOGY | Age: 60
Discharge: HOME | End: 2020-06-22
Payer: MEDICAID

## 2020-06-22 ENCOUNTER — TELEPHONE (OUTPATIENT)
Dept: FAMILY MEDICINE CLINIC | Age: 60
End: 2020-06-22

## 2020-06-22 ENCOUNTER — HOSPITAL ENCOUNTER (OUTPATIENT)
Age: 60
Discharge: HOME | End: 2020-06-22
Payer: MEDICAID

## 2020-06-22 LAB
ALBUMIN SERPL-MCNC: 4.6 G/DL (ref 3.5–5.1)
ALP BLD-CCNC: 112 U/L (ref 38–126)
ALT SERPL-CCNC: 18 U/L (ref 11–66)
ANION GAP SERPL CALCULATED.3IONS-SCNC: 9 MEQ/L (ref 8–16)
AST SERPL-CCNC: 21 U/L (ref 5–40)
BACTERIA: ABNORMAL
BASOPHILS # BLD: 1.1 %
BASOPHILS ABSOLUTE: 0.1 THOU/MM3 (ref 0–0.1)
BILIRUB SERPL-MCNC: 0.4 MG/DL (ref 0.3–1.2)
BILIRUBIN URINE: NEGATIVE
BLOOD, URINE: NEGATIVE
BUN BLDV-MCNC: 11 MG/DL (ref 7–22)
CALCIUM SERPL-MCNC: 9.7 MG/DL (ref 8.5–10.5)
CASTS: ABNORMAL /LPF
CASTS: ABNORMAL /LPF
CHARACTER, URINE: ABNORMAL
CHLORIDE BLD-SCNC: 103 MEQ/L (ref 98–111)
CHOLESTEROL, FASTING: 183 MG/DL (ref 100–199)
CO2: 32 MEQ/L (ref 23–33)
COLOR: YELLOW
CREAT SERPL-MCNC: 0.5 MG/DL (ref 0.4–1.2)
CRYSTALS: ABNORMAL
EOSINOPHIL # BLD: 5.5 %
EOSINOPHILS ABSOLUTE: 0.3 THOU/MM3 (ref 0–0.4)
EPITHELIAL CELLS, UA: ABNORMAL /HPF
ERYTHROCYTE [DISTWIDTH] IN BLOOD BY AUTOMATED COUNT: 12.7 % (ref 11.5–14.5)
ERYTHROCYTE [DISTWIDTH] IN BLOOD BY AUTOMATED COUNT: 41.3 FL (ref 35–45)
GFR SERPL CREATININE-BSD FRML MDRD: > 90 ML/MIN/1.73M2
GLUCOSE BLD-MCNC: 97 MG/DL (ref 70–108)
GLUCOSE, URINE: NEGATIVE MG/DL
HCT VFR BLD CALC: 47.5 % (ref 37–47)
HDLC SERPL-MCNC: 75 MG/DL
HEMOGLOBIN: 15.3 GM/DL (ref 12–16)
IMMATURE GRANS (ABS): 0.01 THOU/MM3 (ref 0–0.07)
IMMATURE GRANULOCYTES: 0.2 %
KETONES, URINE: NEGATIVE
LDL CHOLESTEROL CALCULATED: 94 MG/DL
LEUKOCYTE ESTERASE, URINE: NEGATIVE
LYMPHOCYTES # BLD: 27.4 %
LYMPHOCYTES ABSOLUTE: 1.5 THOU/MM3 (ref 1–4.8)
MCH RBC QN AUTO: 28.7 PG (ref 26–33)
MCHC RBC AUTO-ENTMCNC: 32.2 GM/DL (ref 32.2–35.5)
MCV RBC AUTO: 89 FL (ref 81–99)
MISCELLANEOUS LAB TEST RESULT: ABNORMAL
MONOCYTES # BLD: 7.1 %
MONOCYTES ABSOLUTE: 0.4 THOU/MM3 (ref 0.4–1.3)
NITRITE, URINE: NEGATIVE
NUCLEATED RED BLOOD CELLS: 0 /100 WBC
PH UA: 8.5 (ref 5–9)
PLATELET # BLD: 264 THOU/MM3 (ref 130–400)
PMV BLD AUTO: 9.9 FL (ref 9.4–12.4)
POTASSIUM SERPL-SCNC: 4.3 MEQ/L (ref 3.5–5.2)
PROTEIN UA: NEGATIVE MG/DL
RBC # BLD: 5.34 MILL/MM3 (ref 4.2–5.4)
RBC URINE: ABNORMAL /HPF
RENAL EPITHELIAL, UA: ABNORMAL
SEG NEUTROPHILS: 58.7 %
SEGMENTED NEUTROPHILS ABSOLUTE COUNT: 3.3 THOU/MM3 (ref 1.8–7.7)
SODIUM BLD-SCNC: 144 MEQ/L (ref 135–145)
SPECIFIC GRAVITY UA: 1.01 (ref 1–1.03)
TOTAL PROTEIN: 7.6 G/DL (ref 6.1–8)
TRIGLYCERIDE, FASTING: 72 MG/DL (ref 0–199)
TSH SERPL DL<=0.05 MIU/L-ACNC: 0.98 UIU/ML (ref 0.4–4.2)
UROBILINOGEN, URINE: 0.2 EU/DL (ref 0–1)
VITAMIN B-12: 677 PG/ML (ref 211–911)
WBC # BLD: 5.6 THOU/MM3 (ref 4.8–10.8)
WBC UA: ABNORMAL /HPF
YEAST: ABNORMAL

## 2020-06-22 PROCEDURE — 84443 ASSAY THYROID STIM HORMONE: CPT

## 2020-06-22 PROCEDURE — 85025 COMPLETE CBC W/AUTO DIFF WBC: CPT

## 2020-06-22 PROCEDURE — 81001 URINALYSIS AUTO W/SCOPE: CPT

## 2020-06-22 PROCEDURE — 72050 X-RAY EXAM NECK SPINE 4/5VWS: CPT

## 2020-06-22 PROCEDURE — 80053 COMPREHEN METABOLIC PANEL: CPT

## 2020-06-22 PROCEDURE — 82607 VITAMIN B-12: CPT

## 2020-06-22 PROCEDURE — 80061 LIPID PANEL: CPT

## 2020-06-22 PROCEDURE — 36415 COLL VENOUS BLD VENIPUNCTURE: CPT

## 2020-06-22 NOTE — TELEPHONE ENCOUNTER
----- Message from DHIRAJ Lentz CNP sent at 6/22/2020  9:23 AM EDT -----  Labs are within acceptable ranges.

## 2020-07-07 ENCOUNTER — HOSPITAL ENCOUNTER (EMERGENCY)
Age: 60
Discharge: HOME | End: 2020-07-07
Payer: MEDICAID

## 2020-07-07 ENCOUNTER — VIRTUAL VISIT (OUTPATIENT)
Dept: FAMILY MEDICINE CLINIC | Age: 60
End: 2020-07-07
Payer: MEDICAID

## 2020-07-07 VITALS
BODY MASS INDEX: 18.72 KG/M2 | DIASTOLIC BLOOD PRESSURE: 76 MMHG | TEMPERATURE: 98 F | OXYGEN SATURATION: 100 % | RESPIRATION RATE: 16 BRPM | HEART RATE: 74 BPM | WEIGHT: 116 LBS | SYSTOLIC BLOOD PRESSURE: 156 MMHG

## 2020-07-07 LAB
EKG ATRIAL RATE: 71 BPM
EKG P AXIS: -3 DEGREES
EKG P-R INTERVAL: 144 MS
EKG Q-T INTERVAL: 402 MS
EKG QRS DURATION: 94 MS
EKG QTC CALCULATION (BAZETT): 436 MS
EKG R AXIS: 23 DEGREES
EKG T AXIS: 60 DEGREES
EKG VENTRICULAR RATE: 71 BPM

## 2020-07-07 PROCEDURE — 99213 OFFICE O/P EST LOW 20 MIN: CPT

## 2020-07-07 PROCEDURE — 99213 OFFICE O/P EST LOW 20 MIN: CPT | Performed by: NURSE PRACTITIONER

## 2020-07-07 PROCEDURE — 3017F COLORECTAL CA SCREEN DOC REV: CPT | Performed by: NURSE PRACTITIONER

## 2020-07-07 PROCEDURE — 99212 OFFICE O/P EST SF 10 MIN: CPT | Performed by: NURSE PRACTITIONER

## 2020-07-07 PROCEDURE — G8428 CUR MEDS NOT DOCUMENT: HCPCS | Performed by: NURSE PRACTITIONER

## 2020-07-07 PROCEDURE — 93005 ELECTROCARDIOGRAM TRACING: CPT | Performed by: NURSE PRACTITIONER

## 2020-07-07 RX ORDER — TIZANIDINE 2 MG/1
2 TABLET ORAL EVERY 8 HOURS PRN
Qty: 12 TABLET | Refills: 0 | Status: SHIPPED | OUTPATIENT
Start: 2020-07-07 | End: 2020-07-10 | Stop reason: SDUPTHER

## 2020-07-07 ASSESSMENT — ENCOUNTER SYMPTOMS
COUGH: 0
EYE REDNESS: 0
DIARRHEA: 0
SORE THROAT: 0
SHORTNESS OF BREATH: 1
EYE DISCHARGE: 0
NAUSEA: 0
NAUSEA: 1
TROUBLE SWALLOWING: 0
VOMITING: 0
CHEST TIGHTNESS: 0
WHEEZING: 0
RHINORRHEA: 0

## 2020-07-07 NOTE — PROGRESS NOTES
discoloration noted on facial skin         [] Abnormal-            Psychiatric:       [x] Normal Affect [x] No Hallucinations        [] Abnormal-     Other pertinent observable physical exam findings-     ASSESSMENT/PLAN:  1. Palpitations      2. Other fatigue      3. Feeling unwell    Patient was referred to the emergency department or urgent care to have an EKG and vital signs checked as I am unable to do that through this virtual visit. The main concern explained to her would be atrial fibrillation or other cardiac abnormality. She was advised that if her EKG was normal and blood pressure stable but she continued to feel bad, she should make an appointment to visit me in the office on Friday. Return in about 3 days (around 7/10/2020), or if symptoms worsen or fail to improve. Ino Fine is a 64 y.o. female being evaluated by a Virtual Visit (video visit) encounter to address concerns as mentioned above. A caregiver was present when appropriate. Due to this being a TeleHealth encounter (During VQYJN-34 public health emergency), evaluation of the following organ systems was limited: Vitals/Constitutional/EENT/Resp/CV/GI//MS/Neuro/Skin/Heme-Lymph-Imm. Pursuant to the emergency declaration under the [facility] [address] authority and the [employer] and [employer] Act, this Virtual Visit was conducted with patient's (and/or legal guardian's) consent, to reduce the patient's risk of exposure to COVID-19 and provide necessary medical care. The patient (and/or legal guardian) has also been advised to contact this office for worsening conditions or problems, and seek emergency medical treatment and/or call 911 if deemed necessary.      Patient identification was verified at the start of the visit: Yes    Total time spent on this encounter: Not billed by time    Services were provided through a video synchronous discussion

## 2020-07-07 NOTE — ED TRIAGE NOTES
States has had therapy for her pinched nerve in her neck a year ago. Still has issues with that,. Feels it may be related to the  Arm numbness she is having today but wanted to be sure. Family dr thought she should be evaluated to be sure.

## 2020-07-07 NOTE — ED NOTES
Pt discharged. Pt verbalized understanding of discharge instructions and script. Pt walked out per self. Pt in stable condition.      Elsa Hernandez LPN  13/33/16 3952

## 2020-07-07 NOTE — ED PROVIDER NOTES
[phone]       Chief Complaint   Patient presents with    Shortness of Breath     left arm numbness  onset today  at home. Nurses Notes reviewed and I agree except as noted in the HPI. HISTORY OF PRESENT ILLNESS   Lev Cary is a 64 y.o. female who presents with c/o SOB and left arm weakness/numbness. Onset of SOB today, unchanged. No activity when s/s started. Denies chest pain or increased leg swelling. No GERD. No diet changes. No travel or ill exposure. She also c/o left arm weakness/numbness. States similar symptoms intermittent due to cervical disc disease/pinched nerve. No changes in physical activity. NKI. She was told by her PCP office to be evaluated at the urgent care. No additional complaints. REVIEW OF SYSTEMS     Review of Systems   Constitutional: Negative for chills, diaphoresis, fatigue and fever. HENT: Negative for congestion, ear pain, rhinorrhea, sore throat and trouble swallowing. Eyes: Negative for discharge and redness. Respiratory: Positive for shortness of breath. Negative for cough, chest tightness and wheezing. Cardiovascular: Negative for chest pain and palpitations. Gastrointestinal: Negative for diarrhea, nausea and vomiting. Genitourinary: Negative for decreased urine volume. Musculoskeletal: Positive for neck pain. Negative for arthralgias, joint swelling and neck stiffness. Skin: Negative for rash. Neurological: Positive for weakness and numbness. Negative for headaches. Hematological: Negative for adenopathy. Psychiatric/Behavioral: Negative for sleep disturbance. The patient is nervous/anxious.         PAST MEDICAL HISTORY         Diagnosis Date    Hyperlipidemia 12/2013    Tyrone Hoover    Migraine 1998    TMJ (dislocation of temporomandibular joint) 10/12/2016    ED visit       SURGICAL HISTORY     Patient  has a past surgical history that includes Appendectomy (1987); Tonsillectomy; Colonoscopy; and pr colon ca scrn not hi rsk ind (N/A, 7/3/2018). CURRENT MEDICATIONS       Previous Medications    ASPIRIN 81 MG EC TABLET    Take 1 tablet by mouth daily. COENZYME Q10 (CO Q 10) 100 MG CAPS    Take 1 capsule by mouth daily. GARLIC PO    Take 1 tablet by mouth daily. LOVASTATIN (MEVACOR) 20 MG TABLET    TAKE ONE TABLET BY MOUTH NIGHTLY    MULTIPLE VITAMINS-MINERALS (THERAPEUTIC MULTIVITAMIN-MINERALS) TABLET    Take 1 tablet by mouth daily. ALLERGIES     Patient is is allergic to amoxicillin and emetrol. FAMILY HISTORY     Patient'sfamily history includes Heart Attack (age of onset: 59) in her mother; Heart Disease in her brother and brother; Other in her brother. SOCIAL HISTORY     Patient  reports that she quit smoking about 34 years ago. Her smoking use included cigarettes. She has a 1.25 pack-year smoking history. She has never used smokeless tobacco. She reports that she does not drink alcohol or use drugs. PHYSICAL EXAM     ED TRIAGE VITALS  BP: (!) 156/76, Temp: 98 °F (36.7 °C), Pulse: 74, Resp: 16, SpO2: 100 %  Physical Exam  Vitals signs and nursing note reviewed. Constitutional:       General: She is not in acute distress. Appearance: Normal appearance. She is well-developed. She is not ill-appearing, toxic-appearing or diaphoretic. HENT:      Head: Normocephalic and atraumatic. Right Ear: Hearing, tympanic membrane, ear canal and external ear normal.      Left Ear: Hearing, tympanic membrane, ear canal and external ear normal.      Nose: Nose normal.      Mouth/Throat:      Mouth: Mucous membranes are moist.      Pharynx: Oropharynx is clear. Uvula midline. Tonsils: No tonsillar abscesses. Eyes:      General: No scleral icterus. Extraocular Movements: Extraocular movements intact. Conjunctiva/sclera: Conjunctivae normal.      Right eye: Right conjunctiva is not injected. No hemorrhage.      Left eye: person, place, and time. She is not disoriented. Cranial Nerves: Cranial nerves are intact. Sensory: Sensation is intact. Motor: Motor function is intact. No weakness. Gait: Gait is intact. Comments: No facial droop or slurred speech     strength equal bilateral   Psychiatric:         Mood and Affect: Mood normal.         Behavior: Behavior is cooperative. DIAGNOSTIC RESULTS   Labs: No results found for this visit on 07/07/20. IMAGING:  No orders to display     URGENT CARE COURSE:     Vitals:    07/07/20 1334 07/07/20 1337   BP: (!) 159/63 (!) 156/76   Pulse: 77 74   Resp: 16 16   Temp: 98 °F (36.7 °C) 98 °F (36.7 °C)   TempSrc:  Temporal   SpO2: 100% 100%   Weight: 116 lb (52.6 kg)        Medications - No data to display  PROCEDURES:  None  FINALIMPRESSION      1. Shortness of breath    2. Situational anxiety    3. Cervical neck pain with evidence of disc disease    4. Muscle spasm of back        DISPOSITION/PLAN   DISPOSITION Decision To Discharge 07/07/2020 01:44:29 PM    Non-toxic, no distress. EKG NSR. Arm weakness/numbness secondary to cervical radicular pain/musicle spasm. Rx Zanaflex. Possible massage. Heat/ice. Activity as tolerated. SOB with no distress. Lung sounds equal throughout. Consider anxious in nature as she notes increased stress. Taking care of autistic grandson. Rest, push fluids and elevate legs. Advised if any distress go to ER. PATIENT REFERRED TO:  Yumiko Jordan MD  [address]  [phone]      Follow up with PCP as needed. Medication as prescribed. No driving while on medication. Heat to back/neck. Possible massage. If worse go to ER.     DISCHARGE MEDICATIONS:  New Prescriptions    TIZANIDINE (ZANAFLEX) 2 MG TABLET    Take 1 tablet by mouth every 8 hours as needed (spasm)     Current Discharge Medication List          [id number], APRN - CNP  07/07/20 2773

## 2020-07-08 ENCOUNTER — CARE COORDINATION (OUTPATIENT)
Dept: CARE COORDINATION | Age: 60
End: 2020-07-08

## 2020-07-10 ENCOUNTER — OFFICE VISIT (OUTPATIENT)
Dept: FAMILY MEDICINE CLINIC | Age: 60
End: 2020-07-10
Payer: MEDICAID

## 2020-07-10 VITALS
WEIGHT: 118.6 LBS | HEIGHT: 66 IN | DIASTOLIC BLOOD PRESSURE: 78 MMHG | BODY MASS INDEX: 19.06 KG/M2 | HEART RATE: 66 BPM | SYSTOLIC BLOOD PRESSURE: 123 MMHG | TEMPERATURE: 98.1 F | RESPIRATION RATE: 14 BRPM

## 2020-07-10 PROCEDURE — 1036F TOBACCO NON-USER: CPT | Performed by: NURSE PRACTITIONER

## 2020-07-10 PROCEDURE — 99213 OFFICE O/P EST LOW 20 MIN: CPT | Performed by: NURSE PRACTITIONER

## 2020-07-10 PROCEDURE — 3017F COLORECTAL CA SCREEN DOC REV: CPT | Performed by: NURSE PRACTITIONER

## 2020-07-10 PROCEDURE — G8427 DOCREV CUR MEDS BY ELIG CLIN: HCPCS | Performed by: NURSE PRACTITIONER

## 2020-07-10 PROCEDURE — G8420 CALC BMI NORM PARAMETERS: HCPCS | Performed by: NURSE PRACTITIONER

## 2020-07-10 RX ORDER — TIZANIDINE 4 MG/1
4 TABLET ORAL 2 TIMES DAILY PRN
Qty: 30 TABLET | Refills: 2 | Status: SHIPPED | OUTPATIENT
Start: 2020-07-10 | End: 2021-01-29

## 2020-07-10 NOTE — PROGRESS NOTES
[facility]  [facility], [address]  Ph:   [phone]  Fax: [phone]    Provider:  DHIRAJ Haynes CNP    Patient:  Livia Cadet  YOB: 1960      Visit Date:  7/10/2020     Reason For Visit:   Chief Complaint   Patient presents with    Follow-up     Urgent Care follow up-- Seen on 7-7-2020 for SOB and left arm numbness    Numbness     Persistant numbness of left arm down through hand        Nury Mckeon is a 64 y.o. female who comes today to the office for follow up from an  visit on 7/7. I saw her for a Doxy visit, where she describes having a heart \"flutter\" about 10 days prior and had not felt well since. She did not have the means to check her blood pressure, and out of concern for possible atrial fibrillation or hypotension I referred her to the urgent care. In the urgent care she described shortness of breath and numbness in her left arm. They felt she was very anxious, had very tense muscles and thought that could be contributing to her symptoms. EKG showed a normal sinus rhythm, and her blood pressure was actually little on the high side. The provider recommended that she try getting a massage, she has had one before and did say it helped her feel much better. She was given Tizanidine 2 mg. She has taken it a couple of times and it has helped some. She has taken 4 mg tablets previously prescribed by Dr Kristina Gonzalez and had better results with that dose. Anxiety: Does not feel she has a generalized anxiety. 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 She quit her job to care to care for him. Cervical neck pain: Been ongoing since 2017 when she strained her neck after she fell asleep crocheting. Xray was  Normal, but she was told she may have pinched. She did complete physical therapy and still does the stretches she was taught.   Now she gets instant neck pain and headache with nausea any time she hangs her head down. She also gets associated left arm numbness and tingling. If she can find the \"trigger point\" and press it, it will relieve the symptoms. Other times the pain lasts for hours to the next day. Cervical spine Xray on 6/22 showed   Mild degenerative changes with no acute fracture or subluxation throughout the cervical spine.         She is getting to a point where the pain is affecting her quality of life. The pain causes her to tense up, then she feels short of breath, then she feels anxious, tenses up more, and the process exacerbates. She takes Ibuprofen 200 mg 3-4 times per week when the pain is bad. She has never seen a surgeon before for her symptoms. She states she has been doing well, taking her medications as prescribed. She denies any side effects from her medications. Significant Past Medical History:    Past Medical History:   Diagnosis Date    Hyperlipidemia 12/2013    Faridagiorgio Charlton    Migraine 1998    TMJ (dislocation of temporomandibular joint) 10/12/2016    ED visit           Allergies:  is allergic to amoxicillin and emetrol. Medications:   Current Outpatient Medications   Medication Sig Dispense Refill    tiZANidine (ZANAFLEX) 4 MG tablet Take 1 tablet by mouth 2 times daily as needed (spasm) 30 tablet 2    lovastatin (MEVACOR) 20 MG tablet TAKE ONE TABLET BY MOUTH NIGHTLY 90 tablet 1    GARLIC PO Take 1 tablet by mouth daily.  Coenzyme Q10 (CO Q 10) 100 MG CAPS Take 1 capsule by mouth daily.  Multiple Vitamins-Minerals (THERAPEUTIC MULTIVITAMIN-MINERALS) tablet Take 1 tablet by mouth daily.  aspirin 81 MG EC tablet Take 1 tablet by mouth daily. 30 tablet 3     No current facility-administered medications for this visit.         Review of systems:  Review of Systems - History obtained from the patient  General ROS: negative for - chills, fatigue or fever  Psychological ROS: negative for - anxiety, depression or sleep disturbances  ENT ROS: negative for - headaches, nasal congestion or nasal discharge  Allergy and Immunology ROS: negative for - seasonal allergies  Hematological and Lymphatic ROS: negative for - bruising  Endocrine ROS: negative for - malaise/lethargy, polydipsia/polyuria or temperature intolerance  Respiratory ROS: negative for - cough,  shortness of breath or wheezing  Cardiovascular ROS: negative for - chest pain or edema  Gastrointestinal ROS: negative for - abdominal pain, constipation, diarrhea or nausea/vomiting  Musculoskeletal ROS: Cervical neck pain  Neurological ROS: negative for - dizziness  Dermatological ROS: negative for - rash    Physical Examination:  /78 (Site: Left Upper Arm, Position: Sitting, Cuff Size: Medium Adult)   Pulse 66   Temp 98.1 °F (36.7 °C) (Oral)   Resp 14   Ht 5' 6\" (1.676 m)   Wt 118 lb 9.6 oz (53.8 kg)   BMI 19.14 kg/m²     General-  Alert and oriented x 3, NAD  HEENT: NC, AT, PERRLA, EOMI, anicteric sclerae  Ears: Normal tympanic membranes bilaterally  Nose: patent, no lesions  Mouth: no lesions, moist mucosas  Neck - supple, no significant adenopathy  Chest - clear to auscultation, no wheezes, rales or rhonchi, symmetric air entry  Heart - normal rate, regular rhythm, normal S1, S2, no murmurs, rubs, clicks or gallops  Abdomen - soft, nontender, nondistended, no masses or organomegaly  Extremities - peripheral pulses normal, no pedal edema, no clubbing or cyanosis    Impression:   Diagnosis Orders   1.  Cervical neck pain with evidence of disc disease  Kenyatta Jackson MD, Orthopedic Surgery, [place]    tiZANidine (ZANAFLEX) 4 MG tablet       Plan:  Orders Placed This Encounter   Procedures   Nae Odonnell MD, Orthopedic Surgery, [facility]     Referral Priority:   Routine     Referral Type:   Eval and Treat     Referral Reason:   Specialty Services Required     Referred to Provider:   Marc Pallas, MD     Requested Specialty:   Orthopedic Surgery     Number of Visits Requested:   1     Orders Placed This Encounter   Medications    tiZANidine (ZANAFLEX) 4 MG tablet     Sig: Take 1 tablet by mouth 2 times daily as needed (spasm)     Dispense:  30 tablet     Refill:  2     Increase tizanidine to 4 mg 1 tablet twice daily as needed for muscle spasms  Refer to orthopedics for further evaluation of neck pain and left arm numbness  Discussed nonpharmacological options to help with neck pain, including hot showers, heating pad, and massage. At least 15 minutes was spent with Alanna Davis and over half that time was spent discussing her symptoms and the plan of care. Follow Up:  Return in about 6 weeks (around 8/19/2020) for routine with Dr Naz Harry.     Izabela Urrutia, DHIRAJ - CNP

## 2020-08-28 ENCOUNTER — OFFICE VISIT (OUTPATIENT)
Dept: FAMILY MEDICINE CLINIC | Age: 60
End: 2020-08-28
Payer: MEDICAID

## 2020-08-28 VITALS
SYSTOLIC BLOOD PRESSURE: 131 MMHG | HEIGHT: 66 IN | TEMPERATURE: 97.9 F | DIASTOLIC BLOOD PRESSURE: 74 MMHG | WEIGHT: 121 LBS | BODY MASS INDEX: 19.44 KG/M2 | RESPIRATION RATE: 12 BRPM | HEART RATE: 63 BPM

## 2020-08-28 PROCEDURE — 99396 PREV VISIT EST AGE 40-64: CPT | Performed by: NURSE PRACTITIONER

## 2020-08-28 NOTE — PATIENT INSTRUCTIONS
Patient Education        Well Visit, Women 48 to 72: Care Instructions  Your Care Instructions     Physical exams can help you stay healthy. Your doctor has checked your overall health and may have suggested ways to take good care of yourself. He or she also may have recommended tests. At home, you can help prevent illness with healthy eating, regular exercise, and other steps. Follow-up care is a key part of your treatment and safety. Be sure to make and go to all appointments, and call your doctor if you are having problems. It's also a good idea to know your test results and keep a list of the medicines you take. How can you care for yourself at home? · Reach and stay at a healthy weight. This will lower your risk for many problems, such as obesity, diabetes, heart disease, and high blood pressure. · Get at least 30 minutes of exercise on most days of the week. Walking is a good choice. You also may want to do other activities, such as running, swimming, cycling, or playing tennis or team sports. · Do not smoke. Smoking can make health problems worse. If you need help quitting, talk to your doctor about stop-smoking programs and medicines. These can increase your chances of quitting for good. · Protect your skin from too much sun. When you're outdoors from 10 a.m. to 4 p.m., stay in the shade or cover up with clothing and a hat with a wide brim. Wear sunglasses that block UV rays. Even when it's cloudy, put broad-spectrum sunscreen (SPF 30 or higher) on any exposed skin. · See a dentist one or two times a year for checkups and to have your teeth cleaned. · Wear a seat belt in the car. Follow your doctor's advice about when to have certain tests. These tests can spot problems early. · Cholesterol. Your doctor will tell you how often to have this done based on your age, family history, or other things that can increase your risk for heart attack and stroke. · Blood pressure.  Have your blood pressure checked during a routine doctor visit. Your doctor will tell you how often to check your blood pressure based on your age, your blood pressure results, and other factors. · Mammogram. Ask your doctor how often you should have a mammogram, which is an X-ray of your breasts. A mammogram can spot breast cancer before it can be felt and when it is easiest to treat. · Pap test and pelvic exam. Ask your doctor how often you should have a Pap test. You may not need to have a Pap test as often as you used to. · Vision. Have your eyes checked every year or two or as often as your doctor suggests. Some experts recommend that you have yearly exams for glaucoma and other age-related eye problems starting at age 48. · Hearing. Tell your doctor if you notice any change in your hearing. You can have tests to find out how well you hear. · Diabetes. Ask your doctor whether you should have tests for diabetes. · Colorectal cancer. Your risk for colorectal cancer gets higher as you get older. Some experts say that adults should start regular screening at age 48 and stop at age 76. Others say to start before age 48 or continue after age 76. Talk with your doctor about your risk and when to start and stop screening. · Thyroid disease. Talk to your doctor about whether to have your thyroid checked as part of a regular physical exam. Women have an increased chance of a thyroid problem. · Osteoporosis. You should begin tests for bone density at age 72. If you are younger than 72, ask your doctor whether you have factors that may increase your risk for this disease. You may want to have this test before age 72. · Heart attack and stroke risk. At least every 4 to 6 years, you should have your risk for heart attack and stroke assessed. Your doctor uses factors such as your age, blood pressure, cholesterol, and whether you smoke or have diabetes to show what your risk for a heart attack or stroke is over the next 10 years.   When should you call for help? Watch closely for changes in your health, and be sure to contact your doctor if you have any problems or symptoms that concern you. Where can you learn more? Go to https://danielle.Brandmail [email or web address] org and sign in to your SemiNex account. Enter F006 in the V I O box to learn more about \"Well Visit, Women 50 to 72: Care Instructions. \"     If you do not have an account, please click on the \"Sign Up Now\" link. Current as of: August 22, 2019               Content Version: 12.5  © 3979-9439 [employer]. Care instructions adapted under license by [employer]. If you have questions about a medical condition or this instruction, always ask your healthcare professional. [address] any warranty or liability for your use of this information. Patient Education        Learning About Vitamin D  Why is it important to get enough vitamin D? Your body needs vitamin D to absorb calcium. Calcium keeps your bones and muscles, including your heart, healthy and strong. If your muscles don't get enough calcium, they can cramp, hurt, or feel weak. You may have long-term (chronic) muscle aches and pains. If you don't get enough vitamin D throughout life, you have an increased chance of having thin and brittle bones (osteoporosis) in your later years. Children who don't get enough vitamin D may not grow as much as others their age. They also have a chance of getting a rare disease called rickets. It causes weak bones. Vitamin D and calcium are added to many foods. And your body uses sunshine to make its own vitamin D. How much vitamin D do you need? The [place] of Medicine recommends that people ages 3 through 79 get 600 IU (international units) every day. Adults 71 and older need 800 IU every day. Blood tests for vitamin D can check your vitamin D level. But there is no standard normal range used by all laboratories.  You're likely getting enough vitamin D if your levels are in the range of 20 to 50 ng/mL. How can you get more vitamin D? Foods that contain vitamin D include:  · [place], tuna, and mackerel. These are some of the best foods to eat when you need to get more vitamin D.  · Cheese, egg yolks, and beef liver. These foods have vitamin D in small amounts. · Milk, soy drinks, orange juice, yogurt, margarine, and some kinds of cereal have vitamin D added to them. Some people don't make vitamin D as well as others. They may have to take extra care in getting enough vitamin D. Things that reduce how much vitamin D your body makes include:  · Dark skin, such as many  Americans have. · Age, especially if you are older than 72. · Digestive problems, such as Crohn's or celiac disease. · Liver and kidney disease. Some people who do not get enough vitamin D may need supplements. Are there any risks from taking vitamin D?  · Too much vitamin D:  ? Can damage your kidneys. ? Can cause nausea and vomiting, constipation, and weakness. ? Raises the amount of calcium in your blood. If this happens, you can get confused or have an irregular heart rhythm. · Vitamin D may interact with other medicines. Tell your doctor about all of the medicines you take, including over-the-counter drugs, herbs, and pills. Tell your doctor about all of your current medical problems. Where can you learn more? Go to https://Kidblogyoselyn.Panelfly. org and sign in to your Dr. Jerry's Smooth Move account. Enter [id number] in the [facility] box to learn more about \"Learning About Vitamin D. \"     If you do not have an account, please click on the \"Sign Up Now\" link. Current as of: August 22, 2019               Content Version: 12.5  © 8512-1138 Healthwise, Incorporated. Care instructions adapted under license by [facility] ([facility]).  If you have questions about a medical condition or this instruction, always ask your healthcare professional. Minnie Amaro disclaims any warranty or liability for your use of this information. Patient Education        Learning About High-Vitamin D Foods  What foods are high in vitamin D? The foods you eat contain nutrients, such as vitamins and minerals. Vitamin D is a nutrient. Your body needs the right amount to stay healthy and work as it should. You can use the list below to help you make choices about which foods to eat. Here are some foods that contain vitamin D. Fruits  · Orange juice, fortified with vitamin D  Grains  · Cereals, fortified with vitamin D  Dairy and dairy alternatives  · Milk, fortified with vitamin D  · Non-dairy milk (almond, rice, soy), fortified with vitamin D  · Yogurt, fortified with vitamin D  Protein foods  · Flounder  · Mackerel  · Sardines  · [place]  · Sole  · [place]  · [address]  · Cod liver oil  Work with your doctor to find out how much of this nutrient you need. Depending on your health, you may need more or less of it in your diet. Where can you learn more? Go to https://Contactual.[email or web address] org and sign in to your [employer] account. Enter [id number] in the [facility] box to learn more about \"Learning About High-Vitamin D Foods. \"     If you do not have an account, please click on the \"Sign Up Now\" link. Current as of: August 22, 2019               Content Version: 12.5  © 7160-5044 Healthwise, Incorporated. Care instructions adapted under license by [facility] ([facility]). If you have questions about a medical condition or this instruction, always ask your healthcare professional. [address] any warranty or liability for your use of this information. Patient Education        Learning About High-Calcium Foods  What foods are high in calcium? The foods you eat contain nutrients, such as vitamins and minerals. Calcium is a nutrient. Your body needs the right amount to stay healthy and work as it should.  You can use the list below to help you make choices about which foods to eat. Here are some foods that contain calcium. Fruits  · Figs, dried  · Orange juice, fortified with calcium  Vegetables  · Bok kirstin  · Broccoli  · [place] greens  · Kale  Grains  · Cereals, fortified with calcium  Dairy and dairy alternatives  · Cheese  · Milk  · Non-dairy milk (almond, rice, soy), fortified with calcium  · Yogurt  Protein foods  · Almonds  · [place] or sardines, canned with bones  · Soybeans  · Tofu, fortified with calcium  Work with your doctor to find out how much of this nutrient you need. Depending on your health, you may need more or less of it in your diet. Current as of: August 22, 2019               Content Version: 12.5  © 0666-7855 [employer]. Care instructions adapted under license by [facility] ([facility]). If you have questions about a medical condition or this instruction, always ask your healthcare professional. [place] [address] any warranty or liability for your use of this information. Patient Education        Learning About Calcium  What is calcium? Calcium keeps your bones and muscles--including your heart--healthy and strong. Your body needs vitamin D to absorb calcium. People who don't get enough calcium and vitamin D throughout life have an increased chance of having thin and brittle bones (osteoporosis) in their later years. Thin and brittle bones break easily. They can lead to serious injuries. This is why it's important for you to get enough calcium and vitamin D as a child and as an adult. It helps keep your bones strong as you get older. And it protects you against possible breaks. Your body also uses vitamin D to help your muscles absorb calcium and work well. If your muscles don't get enough calcium, then they can cramp, hurt, or feel weak. You may have long-term (chronic) muscle aches and pains. How much calcium do you need? How much calcium you need each day changes as you age.  The [place] of Medicine recommends the following amounts of calcium each day. · Ages 1 to 3 years: 700 milligrams  · Ages 4 to 8 years: 1,000 milligrams  · Ages 5 to 25 years: 1,300 milligrams  · Ages 23 to 48 years: 1,000 milligrams  · Males 46 to 79 years: 1,000 milligrams  · Females 46 to 79 years: 1,200 milligrams  · Ages 70 and older: 1,200 milligrams  Women who are pregnant or breastfeeding need the same amount of calcium and vitamin D as other women their age. How can you get enough calcium? Calcium is in foods such as milk, cheese, and yogurt. Vegetables like broccoli, kale, and Chinese cabbage also have it. You can get calcium if you eat the soft edible bones in canned sardines and canned salmon. Foods with added (fortified) calcium include some cereals, juices, soy drinks, and tofu. The food label will show how much of it was added. You can figure out how much calcium is in a food by looking at the percent daily value section on the nutrition facts label. The food label assumes the daily value of calcium is 1,000 mg. So if one serving of a food has a daily value of 20% of calcium, that food has 200 mg of calcium in one serving. Some people who don't get enough calcium may need supplements. Two common calcium supplements are calcium citrate and calcium carbonate. Calcium carbonate is best absorbed when it is taken with food. Calcium citrate can be absorbed well with or without food. Spreading calcium out over the course of the day can reduce stomach upset. And your body absorbs it better when it is spread over the day. Try not to take more than 500 mg of calcium supplement at a time. Where can you learn more? Go to https://Whereyoselyn.[email or web address] org and sign in to your [employer] account. Enter S264 in the Zipzoom box to learn more about \"Learning About Calcium. \"     If you do not have an account, please click on the \"Sign Up Now\" link.   Current as of: August 22, 2019               Content Version: 12.5  © 1572-3032 Healthwise, Incorporated. Care instructions adapted under license by [facility] ([facility]). If you have questions about a medical condition or this instruction, always ask your healthcare professional. [address] any warranty or liability for your use of this information. Patient Education        Preventing Osteoporosis: Care Instructions  Your Care Instructions     Osteoporosis means the bones are weak and thin enough that they can break easily. The older you are, the more likely you are to get osteoporosis. But with plenty of calcium, vitamin D, and exercise, you can help prevent osteoporosis. The preteen and teen years are a key time for bone building. With the help of calcium, vitamin D, and exercise in those early years and beyond, the bones reach their peak density and strength by age 27. After age 27, your bones naturally start to thin and weaken. The stronger your bones are at around age 27, the lower your risk for osteoporosis. But no matter what your age and risk are, your bones still need calcium, vitamin D, and exercise to stay strong. Also avoid smoking, and limit alcohol. Smoking and heavy alcohol use can make your bones thinner. Talk to your doctor about any special risks you might have, such as having a close relative with osteoporosis or taking a medicine that can weaken bones. Your doctor can tell you the best ways to protect your bones from thinning. Follow-up care is a key part of your treatment and safety. Be sure to make and go to all appointments, and call your doctor if you are having problems. It's also a good idea to know your test results and keep a list of the medicines you take. How can you care for yourself at home? · Get enough calcium and vitamin D. The [place] of Medicine recommends adults younger than age 46 need 1,000 mg of calcium and 600 IU of vitamin D each day.  Women ages 46 to 79 need 1,200 mg of calcium and 600 IU of vitamin D each day. Men ages 46 to 79 need 1,000 mg of calcium and 600 IU of vitamin D each day. Adults 71 and older need 1,200 mg of calcium and 800 IU of vitamin D each day. ? Eat foods rich in calcium, like yogurt, cheese, milk, and dark green vegetables. ? Eat foods rich in vitamin D, like eggs, fatty fish, cereal, and fortified milk. ? Get some sunshine. Your body uses sunshine to make its own vitamin D. The safest time to be out in the sun is before 10 a.m. or after 3 p.m. Avoid getting sunburned. Sunburn can increase your risk of skin cancer. ? Talk to your doctor about taking a calcium plus vitamin D supplement if you don't think you are getting enough through your diet and sunshine. Ask about what type of calcium is right for you, and how much to take at a time. Adults ages 23 to 48 should not get more than 2,500 mg of calcium and 4,000 IU of vitamin D each day, whether it is from supplements and/or food. Adults ages 46 and older should not get more than 2,000 mg of calcium and 4,000 IU of vitamin D each day from supplements and/or food. · Get regular bone-building exercise. Weight-bearing and resistance exercises keep bones healthy by working the muscles and bones against gravity. Start out at an exercise level that feels right for you. Add a little at a time until you can do the following:  ? Do 30 minutes of weight-bearing exercise on most days of the week. Walking, jogging, stair climbing, and dancing are good choices. ? Do resistance exercises with weights or elastic bands 2 to 3 days a week. · Limit alcohol. Drink no more than 1 alcohol drink a day if you are a woman. Drink no more than 2 alcohol drinks a day if you are a man. · Do not smoke. Smoking can make bones thin faster. If you need help quitting, talk to your doctor about stop-smoking programs and medicines. These can increase your chances of quitting for good. When should you call for help?   Watch closely for changes in your health, and be sure to contact your doctor if you have any problems. Where can you learn more? Go to https://chpepiceweb.Results [email or web address] org and sign in to your Wheelright account. Enter O543 in the [facility] box to learn more about \"Preventing Osteoporosis: Care Instructions. \"     If you do not have an account, please click on the \"Sign Up Now\" link. Current as of: August 7, 2019               Content Version: 12.5  © 8724-9129 Healthwise, Incorporated. Care instructions adapted under license by [facility] ([facility]). If you have questions about a medical condition or this instruction, always ask your healthcare professional. [address] any warranty or liability for your use of this information. Patient Education        Osteoporosis: Care Instructions  Your Care Instructions     Osteoporosis causes bones to become thin and weak. It is much more common in women than in men. Osteoporosis may be very advanced before you know you have it. Sometimes the first sign is a broken bone in the hip, spine, or wrist or sudden pain in your middle or lower back. Follow-up care is a key part of your treatment and safety. Be sure to make and go to all appointments, and call your doctor if you are having problems. It's also a good idea to know your test results and keep a list of the medicines you take. How can you care for yourself at home? · Your doctor may prescribe a bisphosphonate, such as risedronate (Actonel) or alendronate (Fosamax), for osteoporosis. If you are taking one of these medicines by mouth:  ? Take your medicine with a full glass of water when you first get up in the morning. ? Do not lie down, eat, drink a beverage, or take any other medicine for at least 30 minutes after taking the drug. This helps prevent stomach problems. ? Do not take your medicine late in the day if you forgot to take it in the morning. Skip it, and take the usual dose the next morning.   ? If you have side effects, tell your doctor. He or she may prescribe another medicine. · Get enough calcium and vitamin D. The [place] of Medicine recommends adults younger than age 46 need 1,000 mg of calcium and 600 IU of vitamin D each day. Women ages 46 to 79 need 1,200 mg of calcium and 600 IU of vitamin D each day. Men ages 46 to 79 need 1,000 mg of calcium and 600 IU of vitamin D each day. Adults 71 and older need 1,200 mg of calcium and 800 IU of vitamin D each day. It's not clear if people who already have osteoporosis need more calcium and vitamin D than this. Talk to your doctor about what's right for you.  ? Eat foods rich in calcium, like yogurt, cheese, milk, and dark green vegetables. This is a good way to get the calcium you need. You can get vitamin D from eggs, fatty fish, cereal, and milk. ? Ask your doctor if you need to take a calcium plus vitamin D supplement. You may be able to get enough calcium and vitamin D through your diet. Be careful with supplements. Adults ages 23 to 48 should not get more than 2,500 mg of calcium and 4,000 IU of vitamin D each day, whether it is from supplements and/or food. Adults ages 46 and older should not get more than 2,000 mg of calcium and 4,000 IU of vitamin D each day from supplements and/or food. · Limit alcohol to 2 drinks a day for men and 1 drink a day for women. Too much alcohol can cause health problems. · Do not smoke. Smoking puts you at a much higher risk for osteoporosis. If you need help quitting, talk to your doctor about stop-smoking programs and medicines. These can increase your chances of quitting for good. · Get regular bone-building exercise. Weight-bearing and resistance exercises keep bones healthy by working the muscles and bones against gravity. Start out at an exercise level that feels right for you. Add a little at a time until you can do the following:  ? Do 30 minutes of weight-bearing exercise on most days of the week.  Walking, jogging, stair climbing, and dancing are good choices. ? Do resistance exercises with weights or elastic bands 2 to 3 days a week. · Reduce your risk of falls:  ? Wear supportive shoes with low heels and nonslip soles. ? Use a cane or walker, if you need it. Use shower chairs and bath benches. Put in handrails on stairways, around your shower or tub area, and near the toilet. ? Keep stairs, porches, and walkways well lit. Use night-lights. ? Remove throw rugs and other objects that are in the way. ? Avoid icy, wet, or slippery surfaces. ? Keep a cordless phone and a flashlight with new batteries by your bed. When should you call for help? Watch closely for changes in your health, and be sure to contact your doctor if you have any problems. Where can you learn more? Go to https://Academic Management Services.Garden Mate. org and sign in to your [employer] account. Enter K100 in the 40billion.com box to learn more about \"Osteoporosis: Care Instructions. \"     If you do not have an account, please click on the \"Sign Up Now\" link. Current as of: August 7, 2019               Content Version: 12.5  © 4776-5152 Healthwise, Incorporated. Care instructions adapted under license by [facility] ([facility]). If you have questions about a medical condition or this instruction, always ask your healthcare professional. [place] [address] any warranty or liability for your use of this information. Patient Education        Breast Self-Exam: Care Instructions  Your Care Instructions     A breast self-exam is when you check your breasts for lumps or changes. This regular exam helps you learn how your breasts normally look and feel. Most breast problems or changes are not because of cancer. Breast self-exam is not a substitute for a mammogram. Having regular breast exams by your doctor and regular mammograms improve your chances of finding any problems with your breasts.   Some women set a time each month to do a move your hand over your breast (in the strip pattern described above), feeling carefully for any lumps or changes. ? Repeat for the other breast.  · Have your doctor inspect anything you notice to see if you need further testing. Where can you learn more? Go to https://chyoselyn.Revee. org and sign in to your [employer] account. Enter P148 in the [employer]aporWire box to learn more about \"Breast Self-Exam: Care Instructions. \"     If you do not have an account, please click on the \"Sign Up Now\" link. Current as of: August 22, 2019               Content Version: 12.5  © 2839-9620 [employer]wise, [employer]. Care instructions adapted under license by [facility] ([facility]). If you have questions about a medical condition or this instruction, always ask your healthcare professional. [address] any warranty or liability for your use of this information.

## 2020-08-28 NOTE — PROGRESS NOTES
[address] [place]  [address] [place], [address]  Ph:   [phone]  Fax: [phone]    Provider:  [facility]    Wellness Visit    Patient:  Tellis Schwab  YOB: 1960      Visit Date:  8/28/2020    Subjective:  Review of Systems   All other systems reviewed and are negative. Health Habits: With regard to her health habits, she eats 3 meals and 2 snacks per day. She does exercise regularly:   Physical activities include: walking, 6 times per week, 60 minutes/day. She does take over the counter vitamins. She never had a blood transfusion or tattoo. She wears seatbelts while riding a car. She does not text or talk on the phone while driving. She performs all of her ADL's without problem. She is independent, she cooks, drives, bathes, and gets dressed without assistance. She is not . She has 2 children. She does work. She works 20 hours a week. She is happy with her life. She rates her stress level a 3 in a scale 1-10. Health Maintenance   Topic Date Due    Shingles Vaccine (2 of 2) 10/15/2019    Flu vaccine (1) 09/01/2020    Lipid screen  06/22/2021    Breast cancer screen  09/11/2021    Cervical cancer screen  08/01/2022    DTaP/Tdap/Td vaccine (2 - Td) 04/28/2025    Colon cancer screen colonoscopy  07/03/2028    Hepatitis C screen  Completed    HIV screen  Completed    Hepatitis A vaccine  Aged Out    Hepatitis B vaccine  Aged Out    Hib vaccine  Aged Out    Meningococcal (ACWY) vaccine  Aged Out    Pneumococcal 0-64 years Vaccine  Aged Out       She  reports that she quit smoking about 34 years ago. Her smoking use included cigarettes. She has a 1.25 pack-year smoking history. She has never used smokeless tobacco. She reports that she does not drink alcohol or use drugs. .    Her last mammogram was 1 years and it was normal.  Her last pap smear was 3  years and it was normal. She is not sexually active.  She does perform regular breast self exams. [address] [place]  [address] [place], [address]  Ph:   [phone]  Fax: [phone]    Provider:  DHIRAJ Mike - CNP  Progress Note        HPI:  Aurelio Gan is a 64y.o. year old female, who comes today for an annual wellness visit.       Past Medical History:   Diagnosis Date    Hyperlipidemia 2013    Escamilla Bleacher    Migraine 1998    TMJ (dislocation of temporomandibular joint) 10/12/2016    ED visit       Past Surgical History:   Procedure Laterality Date    APPENDECTOMY      COLONOSCOPY      DE COLON CA SCRN NOT  [address] IND N/A 7/3/2018    COLONOSCOPY performed by Michelle Canales MD at [address]      at 13 yrs old       Family History   Problem Relation Age of Onset    Heart Attack Mother 59        due to medication    Other Brother         collitis    Heart Disease Brother     Heart Disease Brother        Social History     Socioeconomic History    Marital status:      Spouse name: Not on file    Number of children: 2    Years of education: 15    Highest education level: Not on file   Occupational History    Occupation: [employer]     Comment: not working at the present time   Social Needs    Financial resource strain: Not on file    Food insecurity     Worry: Not on file     Inability: Not on file   [employer] needs     Medical: Not on file     Non-medical: Not on file   Tobacco Use    Smoking status: Former Smoker     Packs/day: 0.25     Years: 5.00     Pack years: 1.25     Types: Cigarettes     Last attempt to quit: 10/12/1985     Years since quittin.9    Smokeless tobacco: Never Used   Substance and Sexual Activity    Alcohol use: No    Drug use: No    Sexual activity: Never     Partners: Male   Lifestyle    Physical activity     Days per week: Not on file     Minutes per session: Not on file    Stress: Not on file   Relationships    Social connections     Talks on phone: Not on file     Gets together: Not on file     Attends [employer] service: Not on file     Active member of club or organization: Not on file     Attends meetings of clubs or organizations: Not on file     Relationship status: Not on file    Intimate partner violence     Fear of current or ex partner: Not on file     Emotionally abused: Not on file     Physically abused: Not on file     Forced sexual activity: Not on file   Other Topics Concern    Not on file   Social History Narrative    Not on file       Allergies   Allergen Reactions    Amoxicillin     Emetrol Jessenia Jordan has a current medication list which includes the following prescription(s): tizanidine, lovastatin, garlic, co q 10, therapeutic multivitamin-minerals, and aspirin. Objective:  Blood pressure 131/74, pulse 63, temperature 97.9 °F (36.6 °C), temperature source Temporal, resp. rate 12, height 5' 6\" (1.676 m), weight 121 lb (54.9 kg), not currently breastfeeding.     Physical Examination:   General Appearance - alert, well appearing, and in no distress and oriented to person, place, and time  Eyes - pupils equal and reactive, extraocular eye movements intact, sclera anicteric  Ears - bilateral TM's and external ear canals normal, hearing grossly normal bilaterally  Nose - normal and patent, no erythema, discharge or polyps  Mouth - mucous membranes moist, pharynx normal without lesions  Neck - supple, no significant adenopathy  Lymphatics - no palpable lymphadenopathy  Chest - clear to auscultation, no wheezes, rales or rhonchi, symmetric air entry  Heart - normal rate, regular rhythm, normal S1, S2, no murmurs, rubs, clicks or gallops  Abdomen - soft, nontender, nondistended, no masses or organomegaly  Neurological -  oriented, normal speech, no focal findings or movement disorder noted  Extremities - peripheral pulses normal, no pedal edema, no clubbing or cyanosis  Skin - normal coloration and turgor, no rashes, no suspicious skin lesions noted    Assessment:   Diagnosis Orders   1. Routine general medical examination at health care facility     2. Encounter for screening mammogram for malignant neoplasm of breast  [facility] DIGITAL SCREEN W OR WO CAD BILATERAL       Plan:    Return in about 6 months (around 2/28/2021) for routine. .    Counseling was provided today regarding the following topics:  Healthy eating habits and snacks, talking or texting while driving, vitamin/Mineral supplementation, vitamin D and calcium intake, regular exercise, and breast self exam. Written information has been provided.        Gordon Pink, APRN - CNP

## 2020-12-14 ENCOUNTER — TELEPHONE (OUTPATIENT)
Dept: FAMILY MEDICINE CLINIC | Age: 60
End: 2020-12-14

## 2020-12-14 NOTE — TELEPHONE ENCOUNTER
Patient called and LM stating since last Tuesday she has had a nose bleed everyday that lasts 30min to 1 hour. This is something new for her. Please advise.

## 2020-12-15 NOTE — TELEPHONE ENCOUNTER
Patient notified and appt made for Friday. Visit #:  2 of 6, based on treatment plan    Subjective:  Fabio Lacey is a 20 year old female who is seen in f/u up for:        Cervical segment dysfunction  Cervicalgia  Thoracic segment dysfunction  Spasm of muscle  Somatic dysfunction of lumbar region  Lumbago  Somatic dysfunction of pelvis region.     Since last visit on 10/8/2018,  Fabio Lacey reports the following changes: Pain immediately after last treatment: 1/10 and their pain level today 3/10.  Devin reports that she felt really good after last visit and then her neck pain came pain but not as bad a few days later.  She is also having some finger numbness when working at the computer.  Devin also reports that there is some stiffness in her lower back.    Area of chief complaint:  Cervical and Lumbar :  Symptoms are graded at 3/10. The quality is described as stiff, achey.  Motion has increased, but is still not normal. Patient feels that they are improved due to a reduction in symptoms.        Objective:  The following was observed:    P: palpatory tendernessTraps R>>L    A: static palpation demonstrates intersegmental asymmetry , cervical, thoracic, lumbar, pelvis    R: motion palpation notes restricted motion, :  C3 Right rotation restricted  C6 Right rotation restricted  C7 Right rotation restricted  T1 Right rotation restricted  T2 Right rotation restricted  T7 Extension restriction  T8 Extension restriction  T9 Extension restriction  L4 Right rotation restricted  L5 Right rotation restricted  PSIS Right Extension restriction    T: hypertonicity at: Traps R>>L      Assessment:    Segmental spinal dysfunction/restrictions found at:  C3   C6   C7   T1   T2   T7  T8  T9  L4  L5  PSIS Right    Diagnoses:      1. Cervical segment dysfunction    2. Cervicalgia    3. Thoracic segment dysfunction    4. Spasm of muscle    5. Somatic dysfunction of lumbar region    6. Lumbago    7. Somatic dysfunction of pelvis region        Patient's  condition:  Patient had restrictions pre-manipulation    Treatment effectiveness:  Post manipulation there is better intersegmental movement and Patient claims to feel looser post manipulation      Procedures:  CMT:  38403 Chiropractic manipulative treatment 3-4 regions performed   Cervical: Diversified and Activator, C3 , C6, C7 , Prone  Thoracic: Diversified, T1, T2, T7, T8, T9, Prone  Lumbar: Activator, L4, L5, Prone  Pelvis: Drop Table, PSIS Right , Prone    Modalities:  99821: MSTM:  To Traps  for 5 min    Therapeutic procedures:  None      Prognosis: Good    Progress towards Goals: Patient is making progress towards the goal     Response to Treatment:   Reduction in symptoms as reported by patient      Recommendations:    Instructions:ice 20 minutes every other hour as needed    Follow-up:   Return to care in one week

## 2020-12-18 ENCOUNTER — VIRTUAL VISIT (OUTPATIENT)
Dept: FAMILY MEDICINE CLINIC | Age: 60
End: 2020-12-18

## 2020-12-18 PROCEDURE — 3017F COLORECTAL CA SCREEN DOC REV: CPT | Performed by: FAMILY MEDICINE

## 2020-12-18 PROCEDURE — 99213 OFFICE O/P EST LOW 20 MIN: CPT | Performed by: FAMILY MEDICINE

## 2020-12-18 PROCEDURE — G8428 CUR MEDS NOT DOCUMENT: HCPCS | Performed by: FAMILY MEDICINE

## 2020-12-18 RX ORDER — LOVASTATIN 20 MG/1
TABLET ORAL
Qty: 90 TABLET | Refills: 0 | Status: SHIPPED | OUTPATIENT
Start: 2020-12-18 | End: 2021-03-02 | Stop reason: SDUPTHER

## 2020-12-18 NOTE — PROGRESS NOTES
2020    TELEHEALTH EVALUATION -- Audio/Visual (During PEMDT-75 public health emergency)    HPI:    Isis Montes (:  1960) has requested an audio/video evaluation for the following concern(s): Bleeding nose    Moisés Segura stated that for the last 10 days she been having bloody runny nose. When she blows her nose she bleeds for about an hour and then it stops. It has happened every day multiple times a day. She also has been feeling fatigue. She called my office 2 days ago and I recommended to use Afrin nasal spray to stop the bleeding. Which worked for her. Also she would like a refill on her Mevacor 20 mg 1 tab by mouth daily. She check her lipid profile last time in 2020 and her total cholesterol 183, triglycerides 172, HDL 75 and LDL 94. Her lipid profile show an AST of 21 and ALT of 18. Review of Systems:    Denies any fever chills. Denies ear pain. Denies congestion or drainage. Positive for nosebleeds. Positive for fatigue. Negative for cough, shortness of breath or wheezing. Prior to Visit Medications    Medication Sig Taking? Authorizing Provider   lovastatin (MEVACOR) 20 MG tablet TAKE ONE TABLET BY MOUTH NIGHTLY Yes Efra Peace MD   tiZANidine (ZANAFLEX) 4 MG tablet Take 1 tablet by mouth 2 times daily as needed (spasm)  Kelton Cabezas APRN - CNP   GARLIC PO Take 1 tablet by mouth daily. Historical Provider, MD   Coenzyme Q10 (CO Q 10) 100 MG CAPS Take 1 capsule by mouth daily. Historical Provider, MD   Multiple Vitamins-Minerals (THERAPEUTIC MULTIVITAMIN-MINERALS) tablet Take 1 tablet by mouth daily. Historical Provider, MD   aspirin 81 MG EC tablet Take 1 tablet by mouth daily.   Amber Davison MD       Social History     Tobacco Use    Smoking status: Former Smoker     Packs/day: 0.25     Years: 5.00     Pack years: 1.25     Types: Cigarettes     Quit date: 10/12/1985     Years since quittin.2    Smokeless tobacco: Never Used Substance Use Topics    Alcohol use: No    Drug use: No        Past Medical History:   Diagnosis Date    Hyperlipidemia 12/2013    David Martin    Migraine 1998    TMJ (dislocation of temporomandibular joint) 10/12/2016    ED visit       PHYSICAL EXAMINATION:    Constitutional: [x] Appears well-developed and well-nourished [x] No apparent distress       Mental status  [x] Alert and awake  [x] Oriented to person/place/time [x]Able to follow commands      Eyes:  EOM    [x]  Normal  Sclera  [x]  Normal      HENT:   [x] Normocephalic, atraumatic. [x] Mouth/Throat: Mucous membranes are moist.     External Ears [x] Normal      Neck: [x] No visualized mass     Pulmonary/Chest: [x] Respiratory effort normal.  [x] No visualized signs of difficulty breathing or respiratory distress           Musculoskeletal:   [x] Normal range of motion of neck        Neurological:        [x] No Facial Asymmetry (Cranial nerve 7 motor function) (limited exam to video visit)             Skin:        [x] No significant exanthematous lesions or discoloration noted on facial skin             Psychiatric:       [x] Normal Affect       ASSESSMENT/PLAN:    1. Epistaxis  -She has been advised to use Ayr gel in both nostrils, mainly before she goes to bed. -Use a humidifier in her bedroom or add a pot with a wet towel every night to increase humidity environment.  - CBC Auto Differential; Future  - POCT INR  - APTT; Future    2. Hypercholesterolemia  -We will check her fasting lipid profile and comprehensive panel.  -Continue Mevacor 20 mg 1 tablet mouth daily. - lovastatin (MEVACOR) 20 MG tablet; TAKE ONE TABLET BY MOUTH NIGHTLY  Dispense: 90 tablet; Refill: 0      Return in about 4 weeks (around 1/15/2021). Napoleon Quarles is a 64 y.o. female being evaluated by a Virtual Visit (video visit) encounter to address concerns as mentioned above. A caregiver was present when appropriate. Due to this being a TeleHealth encounter (During [facility] public health emergency), evaluation of the following organ systems was limited: Vitals/Constitutional/EENT/Resp/CV/GI//MS/Neuro/Skin/Heme-Lymph-Imm. Pursuant to the emergency declaration under the [address] [place] [address] and the [employer] Act, this Virtual Visit was conducted with patient's (and/or legal guardian's) consent, to reduce the patient's risk of exposure to COVID-19 and provide necessary medical care. The patient (and/or legal guardian) has also been advised to contact this office for worsening conditions or problems, and seek emergency medical treatment and/or call 911 if deemed necessary. Patient identification was verified at the start of the visit: Yes    Total time spent on this encounter: Not billed by time    Services were provided through a video synchronous discussion virtually to substitute for in-person clinic visit. Patient and provider were located at their individual homes. --Vishal Vaca MD on 12/18/2020 at 11:17 AM    An electronic signature was used to authenticate this note.

## 2020-12-21 ENCOUNTER — NURSE ONLY (OUTPATIENT)
Dept: LAB | Age: 60
End: 2020-12-21

## 2020-12-21 LAB
APTT: 32.9 SECONDS (ref 22–38)
BASOPHILS # BLD: 0.9 %
BASOPHILS ABSOLUTE: 0 THOU/MM3 (ref 0–0.1)
EOSINOPHIL # BLD: 5.4 %
EOSINOPHILS ABSOLUTE: 0.2 THOU/MM3 (ref 0–0.4)
ERYTHROCYTE [DISTWIDTH] IN BLOOD BY AUTOMATED COUNT: 12.7 % (ref 11.5–14.5)
ERYTHROCYTE [DISTWIDTH] IN BLOOD BY AUTOMATED COUNT: 41.3 FL (ref 35–45)
HCT VFR BLD CALC: 45.6 % (ref 37–47)
HEMOGLOBIN: 15 GM/DL (ref 12–16)
IMMATURE GRANS (ABS): 0 THOU/MM3 (ref 0–0.07)
IMMATURE GRANULOCYTES: 0 %
LYMPHOCYTES # BLD: 30.6 %
LYMPHOCYTES ABSOLUTE: 1.4 THOU/MM3 (ref 1–4.8)
MCH RBC QN AUTO: 29.2 PG (ref 26–33)
MCHC RBC AUTO-ENTMCNC: 32.9 GM/DL (ref 32.2–35.5)
MCV RBC AUTO: 88.9 FL (ref 81–99)
MONOCYTES # BLD: 9.2 %
MONOCYTES ABSOLUTE: 0.4 THOU/MM3 (ref 0.4–1.3)
NUCLEATED RED BLOOD CELLS: 0 /100 WBC
PLATELET # BLD: 270 THOU/MM3 (ref 130–400)
PMV BLD AUTO: 10 FL (ref 9.4–12.4)
RBC # BLD: 5.13 MILL/MM3 (ref 4.2–5.4)
SEG NEUTROPHILS: 53.9 %
SEGMENTED NEUTROPHILS ABSOLUTE COUNT: 2.4 THOU/MM3 (ref 1.8–7.7)
WBC # BLD: 4.5 THOU/MM3 (ref 4.8–10.8)

## 2020-12-22 ENCOUNTER — TELEPHONE (OUTPATIENT)
Dept: FAMILY MEDICINE CLINIC | Age: 60
End: 2020-12-22

## 2021-01-29 ENCOUNTER — HOSPITAL ENCOUNTER (EMERGENCY)
Age: 61
Discharge: HOME | End: 2021-01-29
Attending: NURSE PRACTITIONER
Payer: MEDICAID

## 2021-01-29 VITALS
DIASTOLIC BLOOD PRESSURE: 66 MMHG | SYSTOLIC BLOOD PRESSURE: 146 MMHG | HEART RATE: 70 BPM | OXYGEN SATURATION: 98 % | BODY MASS INDEX: 18.96 KG/M2 | TEMPERATURE: 97.5 F | WEIGHT: 118 LBS | RESPIRATION RATE: 16 BRPM | HEIGHT: 66 IN

## 2021-01-29 DIAGNOSIS — S29.011A MUSCLE STRAIN OF CHEST WALL, INITIAL ENCOUNTER: Primary | ICD-10-CM

## 2021-01-29 PROCEDURE — 99214 OFFICE O/P EST MOD 30 MIN: CPT | Performed by: NURSE PRACTITIONER

## 2021-01-29 PROCEDURE — 96372 THER/PROPH/DIAG INJ SC/IM: CPT

## 2021-01-29 PROCEDURE — 99213 OFFICE O/P EST LOW 20 MIN: CPT

## 2021-01-29 PROCEDURE — 6360000002 HC RX W HCPCS: Performed by: NURSE PRACTITIONER

## 2021-01-29 RX ORDER — KETOROLAC TROMETHAMINE 10 MG/1
10 TABLET, FILM COATED ORAL EVERY 6 HOURS PRN
Qty: 20 TABLET | Refills: 0 | Status: SHIPPED | OUTPATIENT
Start: 2021-01-29 | End: 2021-03-02 | Stop reason: ALTCHOICE

## 2021-01-29 RX ORDER — KETOROLAC TROMETHAMINE 30 MG/ML
30 INJECTION, SOLUTION INTRAMUSCULAR; INTRAVENOUS ONCE
Status: COMPLETED | OUTPATIENT
Start: 2021-01-29 | End: 2021-01-29

## 2021-01-29 RX ADMIN — KETOROLAC TROMETHAMINE 30 MG: 30 INJECTION, SOLUTION INTRAMUSCULAR at 08:38

## 2021-01-29 ASSESSMENT — PAIN SCALES - GENERAL: PAINLEVEL_OUTOF10: 4

## 2021-01-29 ASSESSMENT — PAIN DESCRIPTION - FREQUENCY: FREQUENCY: CONTINUOUS

## 2021-01-29 ASSESSMENT — ENCOUNTER SYMPTOMS
NAUSEA: 0
SORE THROAT: 0
CHEST TIGHTNESS: 0
SHORTNESS OF BREATH: 0
RHINORRHEA: 0
COUGH: 0
DIARRHEA: 0
VOMITING: 0

## 2021-01-29 ASSESSMENT — PAIN DESCRIPTION - ORIENTATION: ORIENTATION: LEFT

## 2021-01-29 ASSESSMENT — PAIN DESCRIPTION - PAIN TYPE: TYPE: ACUTE PAIN

## 2021-01-29 NOTE — ED PROVIDER NOTES
[place] [id number]  Urgent Care Encounter       CHIEF COMPLAINT       Chief Complaint   Patient presents with    Rib Pain (injury)     bent over to  a box of books and felt a sharp pain to left ribcage- onset monday       Nurses Notes reviewed and I agree except as noted in the HPI. HISTORY OF PRESENT ILLNESS   Tyrone Huber is a 64 y.o. female who presents to the [facility] urgent care for evaluation of left rib pain. Patient reports Monday that she was sitting in a chair and she bent forward to  a box of books and developed sudden rib pain. Pain is located anterior lateral lower left ribs. They are tender to palpation. She currently reports the pain is rated 5 out of 10 and describes it as achy. She reports aggravating factors of movement and walking. She reports using ice, heat, and ibuprofen with minimal improvement. She reports having a muscle relaxer at home and does not wish to have one prescribed. The history is provided by the patient. No  was used. REVIEW OF SYSTEMS     Review of Systems   Constitutional: Negative for activity change, appetite change, chills, fatigue and fever. HENT: Negative for ear discharge, ear pain, rhinorrhea and sore throat. Respiratory: Negative for cough, chest tightness and shortness of breath. Cardiovascular: Negative for chest pain. Gastrointestinal: Negative for diarrhea, nausea and vomiting. Genitourinary: Negative for dysuria. Musculoskeletal: Positive for arthralgias (Rib, anterior, left). Skin: Negative for rash. Allergic/Immunologic: Negative for environmental allergies and food allergies. Neurological: Negative for dizziness and headaches.        PAST MEDICAL HISTORY         Diagnosis Date    Hyperlipidemia 12/2013    Fredna Panning    Migraine 1998    TMJ (dislocation of temporomandibular joint) 10/12/2016    ED visit       SURGICALHISTORY     Patient  has a past surgical history that includes Appendectomy (1987); Tonsillectomy; Colonoscopy; and pr colon ca scrn not hi rsk ind (N/A, 7/3/2018). CURRENT MEDICATIONS       Previous Medications    ASPIRIN 81 MG EC TABLET    Take 1 tablet by mouth daily. COENZYME Q10 (CO Q 10) 100 MG CAPS    Take 1 capsule by mouth daily. GARLIC PO    Take 1 tablet by mouth daily. LOVASTATIN (MEVACOR) 20 MG TABLET    TAKE ONE TABLET BY MOUTH NIGHTLY    MULTIPLE VITAMINS-MINERALS (THERAPEUTIC MULTIVITAMIN-MINERALS) TABLET    Take 1 tablet by mouth daily. ALLERGIES     Patient is is allergic to amoxicillin and emetrol. Patients   Immunization History   Administered Date(s) Administered    Influenza Virus Vaccine 10/01/2015    Influenza, Quadv, 6 mo and older, IM (Fluzone, Flulaval) 09/10/2018    Influenza, Quadv, IM, (6 mo and older Fluzone, Flulaval, Fluarix and 3 yrs and older Afluria) 10/20/2016, 10/09/2017, 09/13/2019    Pneumococcal Conjugate 13-valent (Meredith Malvern) 08/20/2019    Tdap (Boostrix, Adacel) 04/28/2015    Zoster Recombinant (Shingrix) 08/20/2019       FAMILY HISTORY     Patient's family history includes Heart Attack (age of onset: 59) in her mother; Heart Disease in her brother and brother; Other in her brother. SOCIAL HISTORY     Patient  reports that she quit smoking about 35 years ago. Her smoking use included cigarettes. She has a 1.25 pack-year smoking history. She has never used smokeless tobacco. She reports that she does not drink alcohol or use drugs. PHYSICAL EXAM     ED TRIAGE VITALS  BP: (!) 146/66, Temp: 97.5 °F (36.4 °C), Pulse: 70, Resp: 16, SpO2: 98 %,Estimated body mass index is 19.05 kg/m² as calculated from the following:    Height as of this encounter: 5' 6\" (1.676 m). Weight as of this encounter: 118 lb (53.5 kg). ,No LMP recorded. Patient is postmenopausal.    Physical Exam  Vitals signs and nursing note reviewed. Constitutional:       General: She is not in acute distress. Appearance: Normal appearance. She is not ill-appearing, toxic-appearing or diaphoretic. HENT:      Head: Normocephalic. Right Ear: Ear canal and external ear normal.      Left Ear: Ear canal and external ear normal.      Nose: Nose normal. No congestion or rhinorrhea. Mouth/Throat:      Mouth: Mucous membranes are moist.      Pharynx: Oropharynx is clear. No oropharyngeal exudate or posterior oropharyngeal erythema. Neck:      Musculoskeletal: Normal range of motion. Cardiovascular:      Rate and Rhythm: Normal rate. Pulses: Normal pulses. Pulmonary:      Effort: Pulmonary effort is normal. No respiratory distress. Breath sounds: No stridor. No wheezing or rhonchi. Abdominal:      General: Abdomen is flat. Bowel sounds are normal.      Palpations: Abdomen is soft. Musculoskeletal: Normal range of motion. General: Tenderness (left rib anterior and lateral tenderness) present. No swelling. Neurological:      General: No focal deficit present. Mental Status: She is alert and oriented to person, place, and time. Psychiatric:         Mood and Affect: Mood normal.         Behavior: Behavior normal.         DIAGNOSTIC RESULTS     Labs:No results found for this visit on 01/29/21. IMAGING:    No orders to display         EKG: None      URGENT CARE COURSE:     Vitals:    01/29/21 0812   BP: (!) 146/66   Pulse: 70   Resp: 16   Temp: 97.5 °F (36.4 °C)   SpO2: 98%   Weight: 118 lb (53.5 kg)   Height: 5' 6\" (1.676 m)       Medications   ketorolac (TORADOL) injection 30 mg (30 mg Intramuscular Given 1/29/21 0838)            PROCEDURES:  None    FINAL IMPRESSION      1. Muscle strain of chest wall, initial encounter          DISPOSITION/ PLAN     Patient's symptoms appear to be consistent with a muscle strain of left chest wall. Patient is instructed to continue heat intermittently.   She was medicated with Toradol intramuscular here at the [facility] urgent care, along with being provided a prescription for Toradol. She is instructed not to take other NSAIDs such as ibuprofen or naproxen. She reports having been previously prescribed a muscle relaxer which she is instructed to use. She is instructed that muscle relaxants may cause drowsiness. She is instructed not to drink alcohol or drive under these medication. Patient is instructed to follow-up with PCP in 3 to 5 days if no improvement. She is verbally instructed to follow-up with the emergency department for development of severe pain or dyspnea for further testing. Patient is agreeable to the above plan and denies questions.       PATIENT REFERRED TO:  Noni Nyhan, MD  [address] [place] [id number]      DISCHARGE MEDICATIONS:  New Prescriptions    KETOROLAC (TORADOL) 10 MG TABLET    Take 1 tablet by mouth every 6 hours as needed for Pain       Discontinued Medications    TIZANIDINE (ZANAFLEX) 4 MG TABLET    Take 1 tablet by mouth 2 times daily as needed (spasm)       Current Discharge Medication List          DHIRAJ Fernando CNP    (Please note that portions of this note were completed with a voice recognition program. Efforts were made to edit the dictations but occasionally words are mis-transcribed.)         DHIRAJ Fernando CNP  01/29/21 0008

## 2021-02-02 ENCOUNTER — HOSPITAL ENCOUNTER (OUTPATIENT)
Dept: GENERAL RADIOLOGY | Age: 61
Discharge: HOME | End: 2021-02-02
Payer: MEDICAID

## 2021-02-02 ENCOUNTER — HOSPITAL ENCOUNTER (OUTPATIENT)
Age: 61
Discharge: HOME | End: 2021-02-02
Payer: MEDICAID

## 2021-02-02 ENCOUNTER — OFFICE VISIT (OUTPATIENT)
Dept: FAMILY MEDICINE CLINIC | Age: 61
End: 2021-02-02
Payer: MEDICAID

## 2021-02-02 VITALS
SYSTOLIC BLOOD PRESSURE: 122 MMHG | WEIGHT: 119 LBS | BODY MASS INDEX: 19.13 KG/M2 | TEMPERATURE: 97.2 F | HEIGHT: 66 IN | RESPIRATION RATE: 16 BRPM | DIASTOLIC BLOOD PRESSURE: 78 MMHG | HEART RATE: 75 BPM

## 2021-02-02 DIAGNOSIS — R07.81 RIB PAIN ON LEFT SIDE: ICD-10-CM

## 2021-02-02 DIAGNOSIS — R07.81 RIB PAIN ON LEFT SIDE: Primary | ICD-10-CM

## 2021-02-02 PROCEDURE — 99213 OFFICE O/P EST LOW 20 MIN: CPT | Performed by: NURSE PRACTITIONER

## 2021-02-02 PROCEDURE — G8484 FLU IMMUNIZE NO ADMIN: HCPCS | Performed by: NURSE PRACTITIONER

## 2021-02-02 PROCEDURE — 3017F COLORECTAL CA SCREEN DOC REV: CPT | Performed by: NURSE PRACTITIONER

## 2021-02-02 PROCEDURE — G8420 CALC BMI NORM PARAMETERS: HCPCS | Performed by: NURSE PRACTITIONER

## 2021-02-02 PROCEDURE — 1036F TOBACCO NON-USER: CPT | Performed by: NURSE PRACTITIONER

## 2021-02-02 PROCEDURE — G8427 DOCREV CUR MEDS BY ELIG CLIN: HCPCS | Performed by: NURSE PRACTITIONER

## 2021-02-02 PROCEDURE — 71101 X-RAY EXAM UNILAT RIBS/CHEST: CPT

## 2021-02-02 RX ORDER — METHYLPREDNISOLONE 4 MG/1
TABLET ORAL
Qty: 1 KIT | Refills: 0 | Status: SHIPPED | OUTPATIENT
Start: 2021-02-02 | End: 2021-02-08

## 2021-02-02 RX ORDER — LIDOCAINE 50 MG/G
1 PATCH TOPICAL DAILY
Qty: 30 PATCH | Refills: 0 | Status: SHIPPED | OUTPATIENT
Start: 2021-02-02 | End: 2021-03-04

## 2021-02-02 ASSESSMENT — PATIENT HEALTH QUESTIONNAIRE - PHQ9
SUM OF ALL RESPONSES TO PHQ9 QUESTIONS 1 & 2: 0
1. LITTLE INTEREST OR PLEASURE IN DOING THINGS: 0
SUM OF ALL RESPONSES TO PHQ QUESTIONS 1-9: 0

## 2021-02-02 NOTE — PROGRESS NOTES
Tierra Mendoza (:  1960) is a 64 y.o. female,Established patient, here for evaluation of the following chief complaint(s): Other (Patient was seen at [facility] ER on 2021 for muscle strain of the chest wall that is not getting any better.)      ASSESSMENT/PLAN:  1. Rib pain on left side  -     XR RIBS LEFT INCLUDE CHEST (MIN 3 VIEWS); Future  -     methylPREDNISolone (MEDROL DOSEPACK) 4 MG tablet; Take by mouth., Disp-1 kit, R-0Normal  -     lidocaine (LIDODERM) 5 %; Place 1 patch onto the skin daily 12 hours on, 12 hours off., Disp-30 patch, R-0Normal    Orders as above. Discussed using heat or ice, can do Tylenol with Medrol dosepack and lidoderm patches. Will get Xray and relay findings and further recommendations. Return in about 4 weeks (around 3/2/2021). SUBJECTIVE/OBJECTIVE:  HPI     Patient presents today for pain that has persisted since event that occurred . States she was bending straight over from a sitting position to  books then pain occurred. She had not even lifted the books yet when the pain came on. 10/10 pain felt like stabbing with a knife in left rib cage below breast. Followed up in Urgent care - thought it was muscle pain so was given IM toradol and prescription for PO . States it did not help pain. Has not taken in 2 days. Today pain is 5/10, which is about average every day. States she has been doing heat and ice treatments. States pain increases when she lays down and radiates into back. Unable to lay on left side. Patient also stats she is unable to stand up straight all the way. Denies chest pain. Sometimes it \"catches\" and she has to catch her breath, but no dyspnea. Pain is sometimes worse with deep inspiration, but not consistently. Has some nausea on and off, but that is not new. Eating and drinking ok. Review of Systems   Musculoskeletal: Positive for arthralgias (left anterior distal rib pain) and neck pain (chronic).        Physical Exam  Vitals signs reviewed. Constitutional:       General: She is not in acute distress. Appearance: Normal appearance. She is not ill-appearing. Comments: Appears uncomfortable   HENT:      Head: Normocephalic. Right Ear: External ear normal.      Left Ear: External ear normal.      Nose: Nose normal.   Eyes:      Conjunctiva/sclera: Conjunctivae normal.   Cardiovascular:      Rate and Rhythm: Normal rate and regular rhythm. Heart sounds: Normal heart sounds. Pulmonary:      Effort: Pulmonary effort is normal. No respiratory distress. Breath sounds: Normal breath sounds. Chest:      Chest wall: Tenderness (left anterior lower ribs painful to palpation) present. Abdominal:      General: Abdomen is flat. There is no distension. Palpations: Abdomen is soft. There is no mass. Tenderness: There is no abdominal tenderness. There is no guarding or rebound. Hernia: No hernia is present. Musculoskeletal: Normal range of motion. Skin:     General: Skin is warm and dry. Capillary Refill: Capillary refill takes 2 to 3 seconds. Neurological:      Mental Status: She is alert and oriented to person, place, and time. Psychiatric:         Mood and Affect: Mood normal.         Behavior: Behavior normal.         Thought Content: Thought content normal.         Judgment: Judgment normal.       An electronic signature was used to authenticate this note.     --Marcin Garcia, DHIRAJ - CNP

## 2021-03-02 ENCOUNTER — OFFICE VISIT (OUTPATIENT)
Dept: FAMILY MEDICINE CLINIC | Age: 61
End: 2021-03-02
Payer: MEDICAID

## 2021-03-02 VITALS
DIASTOLIC BLOOD PRESSURE: 76 MMHG | HEART RATE: 71 BPM | WEIGHT: 120 LBS | TEMPERATURE: 97.7 F | BODY MASS INDEX: 19.29 KG/M2 | RESPIRATION RATE: 12 BRPM | HEIGHT: 66 IN | SYSTOLIC BLOOD PRESSURE: 121 MMHG

## 2021-03-02 DIAGNOSIS — E78.00 HYPERCHOLESTEROLEMIA: Primary | ICD-10-CM

## 2021-03-02 DIAGNOSIS — Z23 NEED FOR SHINGLES VACCINE: ICD-10-CM

## 2021-03-02 DIAGNOSIS — R07.81 RIB PAIN ON LEFT SIDE: ICD-10-CM

## 2021-03-02 DIAGNOSIS — M79.661 PAIN IN RIGHT LOWER LEG: ICD-10-CM

## 2021-03-02 PROCEDURE — 3017F COLORECTAL CA SCREEN DOC REV: CPT | Performed by: NURSE PRACTITIONER

## 2021-03-02 PROCEDURE — 1036F TOBACCO NON-USER: CPT | Performed by: NURSE PRACTITIONER

## 2021-03-02 PROCEDURE — G8484 FLU IMMUNIZE NO ADMIN: HCPCS | Performed by: NURSE PRACTITIONER

## 2021-03-02 PROCEDURE — G8420 CALC BMI NORM PARAMETERS: HCPCS | Performed by: NURSE PRACTITIONER

## 2021-03-02 PROCEDURE — 99214 OFFICE O/P EST MOD 30 MIN: CPT | Performed by: NURSE PRACTITIONER

## 2021-03-02 PROCEDURE — G8427 DOCREV CUR MEDS BY ELIG CLIN: HCPCS | Performed by: NURSE PRACTITIONER

## 2021-03-02 RX ORDER — LOVASTATIN 20 MG/1
TABLET ORAL
Qty: 90 TABLET | Refills: 1 | Status: SHIPPED | OUTPATIENT
Start: 2021-03-02 | End: 2021-09-07 | Stop reason: SDUPTHER

## 2021-03-02 ASSESSMENT — ENCOUNTER SYMPTOMS
ALLERGIC/IMMUNOLOGIC NEGATIVE: 1
EYES NEGATIVE: 1
GASTROINTESTINAL NEGATIVE: 1
RESPIRATORY NEGATIVE: 1

## 2021-03-02 NOTE — PROGRESS NOTES
Ethan Maciel (:  1960) is a 64 y.o. female,Established patient, here for evaluation of the following chief complaint(s):  Follow-up, Chest Pain (Left rib pain- continues since last appointment. Sore to touch ), and Leg Pain (Right leg ache --- possible restless leg )      ASSESSMENT/PLAN:  1. Hypercholesterolemia  -     lovastatin (MEVACOR) 20 MG tablet; TAKE ONE TABLET BY MOUTH NIGHTLY, Disp-90 tablet, R-1Normal  2. Rib pain on left side  3. Pain in right lower leg  -     Ferritin; Future  -     Basic Metabolic Panel; Future  -     Magnesium; Future  4. Need for shingles vaccine    Orders as above  Will check some labs regarding the right lower leg pain  Advised of alarm symptoms that prompt further/immediate intervention regarding leg pain. Continue Lidocaine patches as needed for rib pain, gentle stretching. Advised to get her mammogram before COVID vaccine, or plan to wait 3-4 months after vaccine to get mammogram due to possible inflammatory response. Return in about 6 months (around 2021) for wellness. SUBJECTIVE/OBJECTIVE:  HPI     Patient presents today for follow up for left rib pain, seen 2/ . States she was bending over to  a books when felt sharp pain in left ribs. Then 3 weeks later was going to  a waste basket when she felt sharp left lower back pain. States it was an 8/10 and took her breath away both times. Has tied ice to the area as well as Lidocaine patches for left sided rib pain/left lower back pain. Xray was clean with no fractures. States the patches are managing the pain. Pain is controlled about 1/10, mostly just painful to the touch. States right lower leg has been achy and restless lately as well. Happens sporadically throughout the day. No redness, swelling, or excessive heat. No periods of prolonged inactivity. Heating pad helps. States she does not do any exercises because it hurts her neck too bad. Has had chronic neck pain for 5 years. She does enjoy walking. Walks 30-60min per day. Takes a multivitamin daily. Hyperlipidemia: Taking Lovastatin 20 mg daily. Mostly low fat diet. Exercises as above. No family hx of early ischemic heart disease. Lipids within acceptable range in June 2020. Due for her 2nd shingles vaccine. Due for her mammogram, ordered in Aug 2020. Is going to get her COVID vaccine through her employer (school). Review of Systems   Constitutional: Negative. HENT: Negative. Eyes: Negative. Respiratory: Negative. Cardiovascular: Negative. Gastrointestinal: Negative. Endocrine: Negative. Genitourinary: Negative. Musculoskeletal: Positive for myalgias (right lower leg). Left rib pain, improved   Skin: Negative. Allergic/Immunologic: Negative. Neurological: Negative. Hematological: Negative. Psychiatric/Behavioral: Negative. Physical Exam  Vitals signs reviewed. Constitutional:       General: She is not in acute distress. Appearance: She is well-developed. HENT:      Head: Normocephalic. Right Ear: External ear normal.      Left Ear: External ear normal.      Nose: Nose normal.      Mouth/Throat:      Pharynx: No oropharyngeal exudate. Eyes:      Conjunctiva/sclera: Conjunctivae normal.      Pupils: Pupils are equal, round, and reactive to light. Neck:      Musculoskeletal: Normal range of motion and neck supple. Thyroid: No thyromegaly. Cardiovascular:      Rate and Rhythm: Normal rate and regular rhythm. Heart sounds: Normal heart sounds. No murmur. No friction rub. No gallop. Pulmonary:      Effort: Pulmonary effort is normal. No respiratory distress. Breath sounds: Normal breath sounds. No wheezing or rales. Abdominal:      General: There is no distension. Palpations: Abdomen is soft. There is no mass. Tenderness: There is no abdominal tenderness. There is no guarding. Musculoskeletal: Normal range of motion. General: Tenderness present. No swelling. Arms:       Right lower leg: No edema. Left lower leg: No edema. Lymphadenopathy:      Cervical: No cervical adenopathy. Skin:     General: Skin is warm and dry. Capillary Refill: Capillary refill takes less than 2 seconds. Neurological:      Mental Status: She is alert and oriented to person, place, and time. Psychiatric:         Behavior: Behavior normal.         Thought Content: Thought content normal.         Judgment: Judgment normal.                 An electronic signature was used to authenticate this note.     --DHIRAJ Guajardo - CNP

## 2021-03-02 NOTE — PROGRESS NOTES
Immunizations Administered     Name Date Dose Route    Zoster Recombinant (Shingrix) 3/2/2021 50 mcg Intramuscular    Lot: [id number]    [facility]: 62292-821-66

## 2021-03-13 ENCOUNTER — HOSPITAL ENCOUNTER (OUTPATIENT)
Age: 61
Discharge: HOME | End: 2021-03-13
Payer: MEDICAID

## 2021-03-13 DIAGNOSIS — M79.661 PAIN IN RIGHT LOWER LEG: ICD-10-CM

## 2021-03-13 LAB
ANION GAP SERPL CALCULATED.3IONS-SCNC: 10 MEQ/L (ref 8–16)
BUN BLDV-MCNC: 14 MG/DL (ref 7–22)
CALCIUM SERPL-MCNC: 9.6 MG/DL (ref 8.5–10.5)
CHLORIDE BLD-SCNC: 102 MEQ/L (ref 98–111)
CO2: 31 MEQ/L (ref 23–33)
CREAT SERPL-MCNC: 0.5 MG/DL (ref 0.4–1.2)
FERRITIN: 145 NG/ML (ref 10–291)
GFR SERPL CREATININE-BSD FRML MDRD: > 90 ML/MIN/1.73M2
GLUCOSE BLD-MCNC: 76 MG/DL (ref 70–108)
MAGNESIUM: 2.3 MG/DL (ref 1.6–2.4)
POTASSIUM SERPL-SCNC: 4.3 MEQ/L (ref 3.5–5.2)
SODIUM BLD-SCNC: 143 MEQ/L (ref 135–145)

## 2021-03-13 PROCEDURE — 36415 COLL VENOUS BLD VENIPUNCTURE: CPT

## 2021-03-13 PROCEDURE — 83735 ASSAY OF MAGNESIUM: CPT

## 2021-03-13 PROCEDURE — 82728 ASSAY OF FERRITIN: CPT

## 2021-03-13 PROCEDURE — 80048 BASIC METABOLIC PNL TOTAL CA: CPT

## 2021-03-16 ENCOUNTER — HOSPITAL ENCOUNTER (OUTPATIENT)
Dept: WOMENS IMAGING | Age: 61
Discharge: HOME | End: 2021-03-16
Payer: MEDICAID

## 2021-03-16 DIAGNOSIS — Z12.31 ENCOUNTER FOR SCREENING MAMMOGRAM FOR MALIGNANT NEOPLASM OF BREAST: ICD-10-CM

## 2021-03-16 PROCEDURE — 77063 BREAST TOMOSYNTHESIS BI: CPT

## 2021-03-30 ENCOUNTER — HOSPITAL ENCOUNTER (OUTPATIENT)
Dept: WOMENS IMAGING | Age: 61
Discharge: HOME | End: 2021-03-30
Payer: MEDICAID

## 2021-03-30 DIAGNOSIS — R92.1 BREAST CALCIFICATION, LEFT: ICD-10-CM

## 2021-03-30 PROCEDURE — G0279 TOMOSYNTHESIS, MAMMO: HCPCS

## 2021-04-06 ENCOUNTER — HOSPITAL ENCOUNTER (OUTPATIENT)
Dept: WOMENS IMAGING | Age: 61
Discharge: HOME | End: 2021-04-06
Payer: MEDICAID

## 2021-04-06 DIAGNOSIS — R92.1 BREAST CALCIFICATION, LEFT: ICD-10-CM

## 2021-04-06 PROCEDURE — 2709999900 MAM STEREO BREAST BX W LOC DEVICE 1ST LESION LEFT

## 2021-04-06 PROCEDURE — 77065 DX MAMMO INCL CAD UNI: CPT

## 2021-04-06 PROCEDURE — 88305 TISSUE EXAM BY PATHOLOGIST: CPT

## 2021-04-06 NOTE — PROGRESS NOTES
Formulation and discussion of sedation / procedure plans, risks, benefits, side effects and alternatives with patient and/or responsible adult completed.     Electronically signed by Finley Angelucci, MD on 4/6/2021 at 1:03 PM

## 2021-04-06 NOTE — PROGRESS NOTES
Breast Biopsy Flowsheet/Post-Operative Care    Date of Procedure: 4/6/2021  Physician: Dr. Marco A Chamorro  Technologist: Porter Lopez RT(R)(M), Flory Charles RT(R)(M)    Biopsy:stereotactic breast biopsy  Lesion type: Non-palpable  Breast: left    Clock face position: Site #1: Inner central         Primary Method of Detection: Mammogram      Microcalcifications   Distribution: Grouped      Biopsy Method:   Mammotome:    Site # 1    Gauge: 10    # of Passes: 10     Clip: Coil      Pre-Op Assessment: (BI-RADS)   4. Suspicious Abnormality    Patient Tolerated Procedure: good  Complications: n/a  Comments: n/a    Post Operative Care  Steri strips: Yes  Dressing: Gauze, Tape   Ice Applied to Site:  No  Evidence of Bleeding:  No    Pain Verbalized: No      Written Discharge Instructions: Yes  Condition at Discharge: good  Time of Discharge: [address]    Electronically signed by Jamar Mcmillan on 4/6/2021 at 1:57 PM

## 2021-04-06 NOTE — PROGRESS NOTES
[facility]  Pre-Biopsy Assessment      Patient Education    Written information about procedure Yes  left   Procedural steps explained Yes Stereotactic Biopsy   Post-op potential: bruising, hematoma, pain Yes    Self-care: activity, care of dressing Yes    Patient verbalized understanding Yes    Consent signed and witnessed Yes      Hormone Therapy Status: n/a    Recent Medication: Aspirin Last Dose: 4-3-20                                     Hormone Replacement Therapy: no    Previous Breast Biopsy: no    Previous Diagnosis Cancer: no    Hysterectomy:no    Emotional Status: Calm    Language or Physical Barriers: n/a    Comments: n/a      Electronically signed by Augusto Benitez on 4/6/2021 at 1:55 PM

## 2021-04-06 NOTE — LETTER
December 2, 2021     [address] [place] [address]  [address] [facility], [address] [place] [address]            RE: Amie Degroot   MRN: [id number]  YOB: 1960   Breast Imaging Exam Done On: 4/6/21      Dear Provider,    Thank you for allowing us to care for your patient. Ej Mcgarry had a breast imaging exam with us on 4/6/21. At that time a Diagnostic Mammogram in 6 Months was recommended. The recommendations were due on 10/3/2021. A letter was previously sent to [address] her of the need for the exam.  As of this date, we do not have record of this exam being performed. We would appreciate your assistance in contacting the patient and scheduling the appointment.     Sincerely,    Yoseph Jean MD   Interpreting Radiologist

## 2021-04-07 ENCOUNTER — CLINICAL DOCUMENTATION (OUTPATIENT)
Dept: WOMENS IMAGING | Age: 61
End: 2021-04-07

## 2021-04-07 NOTE — PROGRESS NOTES
Contact Type :    Telephone  Notes :  After consulting with Dr. Miracle Araya was contacted by telephone. She was informed of the negative biopsy results and the need to return for a 6 month follow up. She voiced understanding. Biopsy site: A little sore. Mag Frausto states she had some bleeding last night when she went to bed. She bled through the gauze so she changed it. She held pressure to area two different times and changed dressing three additional times. There was about a quarter sized amount of blood each time. It stopped bleeding about 1 am and has not bled again. Advised to continue using the ice today and notify us if the bleeding began again. She voiced understanding. Results faxed to: Dr. Abdifatah Munoz and [facility].

## 2021-05-03 ENCOUNTER — CLINICAL DOCUMENTATION (OUTPATIENT)
Dept: WOMENS IMAGING | Age: 61
End: 2021-05-03

## 2021-05-03 NOTE — PROGRESS NOTES
Telephone : Perla Olguin reached out to us because she is having soreness in her breast.  She did experience significant bruising after her Stereotactic biopsy 4/6/21. She states she does feel a lump that formed after that bruising started. Since she is not exhibiting any signs of an infection, it sounds like a hematoma from the biopsy. Perla Olguin states she still does have bruising in the area but it is getting better slowly. Pt. Advised to watch and make sure it heals and lump continues to resolve.

## 2021-05-28 ENCOUNTER — VIRTUAL VISIT (OUTPATIENT)
Dept: FAMILY MEDICINE CLINIC | Age: 61
End: 2021-05-28
Payer: MEDICAID

## 2021-05-28 DIAGNOSIS — N30.00 ACUTE CYSTITIS WITHOUT HEMATURIA: Primary | ICD-10-CM

## 2021-05-28 PROCEDURE — 99212 OFFICE O/P EST SF 10 MIN: CPT | Performed by: FAMILY MEDICINE

## 2021-05-28 PROCEDURE — G8428 CUR MEDS NOT DOCUMENT: HCPCS | Performed by: FAMILY MEDICINE

## 2021-05-28 PROCEDURE — 3017F COLORECTAL CA SCREEN DOC REV: CPT | Performed by: FAMILY MEDICINE

## 2021-05-28 RX ORDER — NITROFURANTOIN 25; 75 MG/1; MG/1
100 CAPSULE ORAL 2 TIMES DAILY
Qty: 10 CAPSULE | Refills: 0 | Status: SHIPPED | OUTPATIENT
Start: 2021-05-28 | End: 2021-06-02

## 2021-05-28 RX ORDER — FLAVOXATE HYDROCHLORIDE 100 MG/1
100 TABLET ORAL 3 TIMES DAILY PRN
Qty: 15 TABLET | Refills: 0 | Status: SHIPPED | OUTPATIENT
Start: 2021-05-28 | End: 2021-09-07

## 2021-05-28 NOTE — PROGRESS NOTES
2021    TELEHEALTH EVALUATION -- Audio/Visual (During [id number]-[id number] public health emergency)    HPI:    Christy Salazar (:  1960) has requested an audio/video evaluation for the following concern(s): think my have an UTI    Stan Ponce requested this visit because this morning she woke up with burning upon urination and frequency. She also has lower abdominal pain. She denies any fever or chills. There is no strong odor in the urine or blood. Review of Systems:  Denies any fever or chills, and  Denies nausea or vomiting. Denies abdominal pain, but she has lower pelvic pain/discomfort. Positive for frequency and dysuria  Denies any vaginal discharge    Prior to Visit Medications    Medication Sig Taking? Authorizing Provider   nitrofurantoin, macrocrystal-monohydrate, (MACROBID) 100 MG capsule Take 1 capsule by mouth 2 times daily for 5 days Yes Eladio Lockett MD   flavoxATE (URISPAS) 100 MG tablet Take 1 tablet by mouth 3 times daily as needed for Muscle spasms Yes Eladio Lockett MD   lovastatin (MEVACOR) 20 MG tablet TAKE ONE TABLET BY MOUTH NIGHTLY  DHIRAJ Dykes CNP   GARLIC PO Take 1 tablet by mouth daily. Historical Provider, MD   Coenzyme Q10 (CO Q 10) 100 MG CAPS Take 1 capsule by mouth daily. Historical Provider, MD   Multiple Vitamins-Minerals (THERAPEUTIC MULTIVITAMIN-MINERALS) tablet Take 1 tablet by mouth daily. Historical Provider, MD   aspirin 81 MG EC tablet Take 1 tablet by mouth daily.   Brittny Mann MD       Social History     Tobacco Use    Smoking status: Former Smoker     Packs/day: 0.25     Years: 5.00     Pack years: 1.25     Types: Cigarettes     Quit date: 10/12/1985     Years since quittin.6    Smokeless tobacco: Never Used   Vaping Use    Vaping Use: Never used   Substance Use Topics    Alcohol use: No    Drug use: No        Past Medical History:   Diagnosis Date    Hyperlipidemia 2013    Mario Denisha    Migraine     TMJ (dislocation of temporomandibular joint) 10/12/2016    ED visit       PHYSICAL EXAMINATION:    Constitutional: [x] Appears well-developed and well-nourished [x] No apparent distress         Mental status  [x] Alert and awake  [x] Oriented to person/place/time []Able to follow commands      Eyes:  EOM    [x]  Normal      HENT:   [x] Normocephalic, atraumatic. [x] Mouth/Throat: Mucous membranes are moist.     External Ears [x] Normal       Neck: [x] No visualized mass     Pulmonary/Chest: [x] Respiratory effort normal.  [x] No visualized signs of difficulty breathing or respiratory distress                 Psychiatric:       [x] Normal Affect     ASSESSMENT/PLAN:    1. Acute cystitis without hematuria  Macrobid 1 tablet by mouth twice a day for 5 days. Urispas 1 tablet 3 times a days as needed. Drink plenty of water      Return if symptoms worsen or fail to improve. Zuri Mina, was evaluated through a synchronous (real-time) audio-video encounter. The patient (or guardian if applicable) is aware that this is a billable service. Verbal consent to proceed has been obtained within the past 12 months. The visit was conducted pursuant to the emergency declaration under the [facility], [address] [place] [address] authority and the [employer] and [employer] General Act. Patient identification was verified, and a caregiver was present when appropriate. The patient was located in a state where the provider was credentialed to provide care. Total time spent on this encounter: Not billed by time    --Altagracia Paz MD on 5/28/2021 at 9:28 AM    An electronic signature was used to authenticate this note.

## 2021-06-02 ENCOUNTER — TELEPHONE (OUTPATIENT)
Dept: FAMILY MEDICINE CLINIC | Age: 61
End: 2021-06-02

## 2021-06-02 DIAGNOSIS — N30.00 ACUTE CYSTITIS WITHOUT HEMATURIA: Primary | ICD-10-CM

## 2021-06-02 NOTE — TELEPHONE ENCOUNTER
Patient called stating she still has been experiencing lower bladder pressure. She has finished the antibiotic but still has Flavoxate. She stated the antibiotic caused nausea and neck ache.  Please advise if anything else could help her bladder SX.

## 2021-06-02 NOTE — TELEPHONE ENCOUNTER
I placed urinalysis testing.   Please tell her to go to [address] [facility] lab and give them a urine sample

## 2021-06-03 ENCOUNTER — TELEPHONE (OUTPATIENT)
Dept: FAMILY MEDICINE CLINIC | Age: 61
End: 2021-06-03

## 2021-06-03 ENCOUNTER — NURSE ONLY (OUTPATIENT)
Dept: LAB | Age: 61
End: 2021-06-03

## 2021-06-03 DIAGNOSIS — N30.00 ACUTE CYSTITIS WITHOUT HEMATURIA: ICD-10-CM

## 2021-06-03 LAB
BACTERIA: NORMAL
BILIRUBIN URINE: NEGATIVE
BLOOD, URINE: NEGATIVE
CASTS: NORMAL /LPF
CASTS: NORMAL /LPF
CHARACTER, URINE: CLEAR
COLOR: YELLOW
CRYSTALS: NORMAL
EPITHELIAL CELLS, UA: NORMAL /HPF
GLUCOSE, URINE: NEGATIVE MG/DL
KETONES, URINE: NEGATIVE
LEUKOCYTE ESTERASE, URINE: NEGATIVE
MISCELLANEOUS LAB TEST RESULT: NORMAL
NITRITE, URINE: NEGATIVE
PH UA: 7.5 (ref 5–9)
PROTEIN UA: NEGATIVE MG/DL
RBC URINE: NORMAL /HPF
RENAL EPITHELIAL, UA: NORMAL
SPECIFIC GRAVITY UA: 1.01 (ref 1–1.03)
UROBILINOGEN, URINE: 0.2 EU/DL (ref 0–1)
WBC UA: NORMAL /HPF
YEAST: NORMAL

## 2021-06-04 NOTE — TELEPHONE ENCOUNTER
If she already finished the antibiotic and the only pills she has left in the Urispas she does not need to take them.   They are mainly for bladder cramping/pain

## 2021-06-04 NOTE — TELEPHONE ENCOUNTER
Spoke with patient regarding message from provider. Patient verbalized understanding with no questions.

## 2021-09-07 ENCOUNTER — OFFICE VISIT (OUTPATIENT)
Dept: FAMILY MEDICINE CLINIC | Age: 61
End: 2021-09-07
Payer: MEDICAID

## 2021-09-07 VITALS
SYSTOLIC BLOOD PRESSURE: 137 MMHG | BODY MASS INDEX: 19.44 KG/M2 | RESPIRATION RATE: 12 BRPM | WEIGHT: 121 LBS | DIASTOLIC BLOOD PRESSURE: 81 MMHG | TEMPERATURE: 98 F | HEART RATE: 68 BPM | HEIGHT: 66 IN

## 2021-09-07 DIAGNOSIS — E78.00 HYPERCHOLESTEROLEMIA: ICD-10-CM

## 2021-09-07 DIAGNOSIS — Z00.00 ROUTINE GENERAL MEDICAL EXAMINATION AT A HEALTH CARE FACILITY: Primary | ICD-10-CM

## 2021-09-07 DIAGNOSIS — Z23 FLU VACCINE NEED: ICD-10-CM

## 2021-09-07 PROCEDURE — 99396 PREV VISIT EST AGE 40-64: CPT | Performed by: NURSE PRACTITIONER

## 2021-09-07 PROCEDURE — 90471 IMMUNIZATION ADMIN: CPT | Performed by: NURSE PRACTITIONER

## 2021-09-07 PROCEDURE — 90674 CCIIV4 VAC NO PRSV 0.5 ML IM: CPT | Performed by: NURSE PRACTITIONER

## 2021-09-07 RX ORDER — LOVASTATIN 20 MG/1
TABLET ORAL
Qty: 90 TABLET | Refills: 1 | Status: SHIPPED | OUTPATIENT
Start: 2021-09-07 | End: 2022-01-18 | Stop reason: SDUPTHER

## 2021-09-07 SDOH — ECONOMIC STABILITY: FOOD INSECURITY: WITHIN THE PAST 12 MONTHS, YOU WORRIED THAT YOUR FOOD WOULD RUN OUT BEFORE YOU GOT MONEY TO BUY MORE.: NEVER TRUE

## 2021-09-07 SDOH — ECONOMIC STABILITY: FOOD INSECURITY: WITHIN THE PAST 12 MONTHS, THE FOOD YOU BOUGHT JUST DIDN'T LAST AND YOU DIDN'T HAVE MONEY TO GET MORE.: NEVER TRUE

## 2021-09-07 ASSESSMENT — SOCIAL DETERMINANTS OF HEALTH (SDOH): HOW HARD IS IT FOR YOU TO PAY FOR THE VERY BASICS LIKE FOOD, HOUSING, MEDICAL CARE, AND HEATING?: NOT HARD AT ALL

## 2021-09-07 NOTE — PROGRESS NOTES
Immunizations Administered     Name Date Dose Route    Influenza, MDCK Quadv, IM, PF (Flucelvax 2 yrs and older) 9/7/2021 0.5 mL Intramuscular    Site: Deltoid- Right    Lot: [id number]    [facility]: 54963-996-16

## 2021-09-07 NOTE — PATIENT INSTRUCTIONS
community. If you find you often feel sad or hopeless, talk with your doctor. Treatment can help. · Talk to your doctor about whether you have any risk factors for sexually transmitted infections (STIs). You can help prevent STIs if you wait to have sex with a new partner (or partners) until you've each been tested for STIs. It also helps if you use condoms (male or female condoms) and if you limit your sex partners to one person who only has sex with you. Vaccines are available for some STIs. · If you think you may have a problem with alcohol or drug use, talk to your doctor. This includes prescription medicines (such as amphetamines and opioids) and illegal drugs (such as cocaine and methamphetamine). Your doctor can help you figure out what type of treatment is best for you. · Protect your skin from too much sun. When you're outdoors from 10 a.m. to 4 p.m., stay in the shade or cover up with clothing and a hat with a wide brim. Wear sunglasses that block UV rays. Even when it's cloudy, put broad-spectrum sunscreen (SPF 30 or higher) on any exposed skin. · See a dentist one or two times a year for checkups and to have your teeth cleaned. · Wear a seat belt in the car. When should you call for help? Watch closely for changes in your health, and be sure to contact your doctor if you have any problems or symptoms that concern you. Where can you learn more? Go to [email or web address] org and sign in to your [employer] account. Enter G909 in the [facility] box to learn more about \"Well Visit, Women 50 to 72: Care Instructions. \"     If you do not have an account, please click on the \"Sign Up Now\" link. Current as of: May 27, 2020               Content Version: 12.9  © 5729-3796 Healthwise, Incorporated. Care instructions adapted under license by [facility] ([facility]).  If you have questions about a medical condition or this instruction, always ask your healthcare professional. Massiel Sanchez, switches (such as switches that go on or off when you clap your hands) to make it easier to turn lights on if you have to get up during the night. · Install sturdy handrails on stairways. · Move items in your cabinets so that the things you use a lot are on the lower shelves (about waist level). · Keep a cordless phone and a flashlight with new batteries by your bed. If possible, put a phone in each of the main rooms of your house, or carry a cell phone in case you fall and cannot reach a phone. Or, you can wear a device around your neck or wrist. You push a button that sends a signal for help. · Wear low-heeled shoes that fit well and give your feet good support. Use footwear with nonskid soles. Check the heels and soles of your shoes for wear. Repair or replace worn heels or soles. · Do not wear socks without shoes on wood floors. · Walk on the grass when the sidewalks are slippery. If you live in an area that gets snow and ice in the winter, sprinkle salt on slippery steps and sidewalks. Preventing falls in the bath  · Install grab bars and nonskid mats inside and outside your shower or tub and near the toilet and sinks. · Use shower chairs and bath benches. · Use a hand-held shower head that will allow you to sit while showering. · Get into a tub or shower by putting the weaker leg in first. Get out of a tub or shower with your strong side first.  · Repair loose toilet seats and consider installing a raised toilet seat to make getting on and off the toilet easier. · Keep your bathroom door unlocked while you are in the shower. How can you prevent falls outdoors? 1. Wear shoes with firm soles and low heels. If you have to walk on an icy surface, use grippers that can be worn over your shoes in bad weather. 2. Be extra careful if weather is bad. Walk on the grass when the sidewalks are slick.  If you live in a place that gets snow and ice in the winter, sprinkle salt on slippery stairs and sidewalks. 3. Be careful getting on or off buses and trains or getting in and out of cars. If handrails are available, use them. 4. Be careful when you cross the street. Look for crosswalks or places where curb cuts or ramps are present. 5. Try not to hurry, especially if you are carrying something. 6. Be cautious in parking lots or garages. There may be curbs or changes in pavement, or the height of the pavement may vary. 7. Make sure to wear the correct eyeglasses, if you need them. Reading glasses or bifocals can make it harder to see hazards that might be in your way. 8. If you are walking outdoors for exercise, try to:  ? Walk in well-lighted, well-maintained areas. These include high school or college tracks, shopping malls, and public spaces. ? Walk with a partner. ? Watch out for cracked sidewalks, curbs, changes in the height of the pavement, exposed tree roots, and debris such as fallen leaves or branches. How can you care for yourself after a fall? If you think you can get up  First lie still for a few minutes and think about how you feel. If your body feels okay and you think you can get up safely, follow the rest of the steps below:  1. Look for a chair or other piece of furniture that is close to you. 2. Roll onto your side and rest. Roll by turning your head in the direction you want to roll, move your shoulder and arm, then hip and leg in the same direction. 3. Lie still for a moment to let your blood pressure adjust.  4. Slowly push your upper body up, lift your head, and take a moment to rest.  5. Slowly get up on your hands and knees, and crawl to the chair or other stable piece of furniture. 6. Put your hands on the chair. 7. Move one foot forward, and place it flat on the floor. Your other leg should be bent with the knee on the floor. 8. Rise slowly, turn your body, and sit in the chair. Stay seated for a bit and think about how you feel. Call for help.  Even if you feel okay, let someone know what happened to you. You might not know that you have a serious injury. If you cannot get up  1. If you think you are injured after a fall or you cannot get up, try not to panic. 2. Call out for help. 3. If you have a phone within reach or you have an emergency call device, use it to call for help. 4. If you do not have a phone within reach, try to slide yourself toward it. If you cannot get to the phone, try to slide toward a door or window or a place where you think you can be heard. 5. [place] or use an object to make noise so someone might hear you. 6. If you can reach something that you can use for a pillow, place it under your head. Try to stay warm by covering yourself with a blanket or clothing while you wait for help. When should you call for help? Call 911 anytime you think you may need emergency care. For example, call if:    · You passed out (lost consciousness). · You cannot get up after a fall. · You have severe pain. Call your doctor now or seek immediate medical care if:    · You have new or worse pain. · You are dizzy or lightheaded. · You hit your head. Watch closely for changes in your health, and be sure to contact your doctor if:    · You do not get better as expected.      Recommend Calcium 1200 mg PO QD, 1/2 from food, 1/2 can come from supplement  Recommend Vitamin D3 to 1000 IU PO daily  Calcium and Vitamin D rich diet information  Aerobic physical activity at least 5 times a week for at least 30 minutes  Fifteen minutes sun exposure daily, between 8-10 am or 4-6 pm.

## 2021-09-07 NOTE — PROGRESS NOTES
[address]  [address], [address]  Ph:   [phone]  Fax: [phone]    Provider:  DHIRAJ Poole-CNP    Wellness Visit    Patient:  Silviano Lundberg  YOB: 1960      Visit Date:  9/7/2021    Subjective:  Review of Systems   All other systems reviewed and are negative. Health Habits: With regard to her health habits, she eats 3 meals and 1 snacks per day. She does exercise regularly:   Physical activities include: walking, 6 times per week, 60 minutes/day. She does take over the counter vitamins. She never had a blood transfusion or tattoo. She wears seatbelts while riding a car. She does not text or talk on the phone while driving. She performs all of her ADL's without problem. She is independent, she cooks, drives, bathes, and gets dressed without assistance. She is not . She has 2 children. She does work. She works part time at [employer]. She is happy with her life. She rates her stress level a 2 in a scale 1-10. Health Maintenance   Topic Date Due    COVID-19 Vaccine (1) Never done    Lipid screen  06/22/2021    Flu vaccine (1) 09/01/2021    Cervical cancer screen  08/01/2022    Breast cancer screen  04/06/2023    DTaP/Tdap/Td vaccine (2 - Td or Tdap) 04/28/2025    Colon cancer screen colonoscopy  07/03/2028    Shingles Vaccine  Completed    Hepatitis C screen  Completed    HIV screen  Completed    Hepatitis A vaccine  Aged Out    Hepatitis B vaccine  Aged Out    Hib vaccine  Aged Out    Meningococcal (ACWY) vaccine  Aged Out    Pneumococcal 0-64 years Vaccine  Aged Out       She  reports that she quit smoking about 35 years ago. Her smoking use included cigarettes. She has a 1.25 pack-year smoking history. She has never used smokeless tobacco. She reports that she does not drink alcohol and does not use drugs. .    Her last mammogram was 5 months and it was abnormal - Cluster of punctate and linear calcifications left breast.  Her last pap smear was 4  years and it was normal. She is not sexually active. She does perform regular breast self exams. [address]  [address] [facility] [address], [address]  Ph:   [phone]  Fax: [phone]    Provider:  Clair Escobar, DHIRAJ - CNP  Progress Note        HPI:  Kelsi Olson is a 64y.o. year old female, who comes today for an annual wellness visit. Lovastatin for hyperlipidemia. Mostly low cholesterol diet. Exercises 6 days/wk. Mother had MI late 63's, was very overweight. Past Medical History:   Diagnosis Date    Hyperlipidemia 2013    Kathey Leisure    Migraine 1998    TMJ (dislocation of temporomandibular joint) 10/12/2016    ED visit       Past Surgical History:   Procedure Laterality Date    APPENDECTOMY      BREAST BIOPSY      COLONOSCOPY      [facility] STEROTACTIC LOC BREAST BIOPSY LEFT Left 2021    [facility] STEROTACTIC LOC BREAST BIOPSY LEFT 2021 Bhaskar Olmos MD [facility]    [place] COLON CA SCRN NOT  [address] IND N/A 7/3/2018    COLONOSCOPY performed by Laurie Delacruz MD at [address]      at 13 yrs old       Family History   Problem Relation Age of Onset    Heart Attack Mother 59        due to medication    Other Brother         collitis    Heart Disease Brother     Heart Disease Brother        Social History     Socioeconomic History    Marital status:      Spouse name: Not on file    Number of children: 2    Years of education: 15    Highest education level: Not on file   Occupational History    Occupation: [employer]     Comment: not working at the present time   Tobacco Use    Smoking status: Former Smoker     Packs/day: 0.25     Years: 5.00     Pack years: 1.25     Types: Cigarettes     Quit date: 10/12/1985     Years since quittin.9    Smokeless tobacco: Never Used   Vaping Use    Vaping Use: Never used   Substance and Sexual Activity    Alcohol use:  No  Drug use: No    Sexual activity: Never     Partners: Male   Other Topics Concern    Not on file   Social History Narrative    Not on file     Social Determinants of Health     Financial Resource Strain: Low Risk     Difficulty of Paying Living Expenses: Not hard at all   Food Insecurity: No Food Insecurity    Worried About Running Out of Food in the Last Year: Never true    [address] in the Last Year: Never true   Transportation Needs:     Lack of Transportation (Medical):  Lack of Transportation (Non-Medical):    Physical Activity:     Days of Exercise per Week:     Minutes of Exercise per Session:    Stress:     Feeling of Stress :    Social Connections:     Frequency of Communication with Friends and Family:     Frequency of Social Gatherings with Friends and Family:     Attends Zoroastrianism Services:     Active Member of Clubs or Organizations:     Attends Club or Organization Meetings:     Marital Status:    Intimate Partner Violence:     Fear of Current or Ex-Partner:     Emotionally Abused:     Physically Abused:     Sexually Abused: Allergies   Allergen Reactions    Amoxicillin        Alba Dumont has a current medication list which includes the following prescription(s): lovastatin, garlic, co q 10, therapeutic multivitamin-minerals, and aspirin. Objective:  Blood pressure 137/81, pulse 68, temperature 98 °F (36.7 °C), temperature source Temporal, resp. rate 12, height 5' 6\" (1.676 m), weight 121 lb (54.9 kg), not currently breastfeeding.     Physical Examination:   General Appearance - alert, well appearing, and in no distress and oriented to person, place, and time  Eyes - pupils equal and reactive, extraocular eye movements intact, sclera anicteric  Ears - bilateral TM's and external ear canals normal, hearing grossly normal bilaterally  Nose - normal and patent, no erythema, discharge or polyps  Mouth - mucous membranes moist, pharynx normal without lesions  Neck - supple, no significant adenopathy  Lymphatics - no palpable lymphadenopathy  Chest - clear to auscultation, no wheezes, rales or rhonchi, symmetric air entry  Heart - normal rate, regular rhythm, normal S1, S2, no murmurs, rubs, clicks or gallops  Abdomen - soft, nontender, nondistended, no masses or organomegaly  Neurological -  oriented, normal speech, no focal findings or movement disorder noted  Extremities - peripheral pulses normal, no pedal edema, no clubbing or cyanosis  Skin - normal coloration and turgor, no rashes, no suspicious skin lesions noted    Assessment:   Diagnosis Orders   1. Routine general medical examination at a health care facility  CBC Auto Differential    Comprehensive Metabolic Panel    Lipid Panel    Vitamin D 25 Hydroxy    Urinalysis with Microscopic    TSH without Reflex   2. Hypercholesterolemia  lovastatin (MEVACOR) 20 MG tablet    Lipid Panel   3. Flu vaccine need         Plan:    Return in about 6 months (around 3/7/2022). .    Counseling was provided today regarding the following topics:  Healthy eating habits and snacks, talking or texting while driving, vitamin/Mineral supplementation, vitamin D and calcium intake, regular exercise, and breast self exam. Written information has been provided.        Betsy Aaron, APRN - CNP

## 2021-09-08 ENCOUNTER — NURSE ONLY (OUTPATIENT)
Dept: LAB | Age: 61
End: 2021-09-08

## 2021-09-08 DIAGNOSIS — E78.00 HYPERCHOLESTEROLEMIA: ICD-10-CM

## 2021-09-08 DIAGNOSIS — Z00.00 ROUTINE GENERAL MEDICAL EXAMINATION AT A HEALTH CARE FACILITY: ICD-10-CM

## 2021-09-08 LAB
ALBUMIN SERPL-MCNC: 4.9 G/DL (ref 3.5–5.1)
ALP BLD-CCNC: 114 U/L (ref 38–126)
ALT SERPL-CCNC: 16 U/L (ref 11–66)
ANION GAP SERPL CALCULATED.3IONS-SCNC: 10 MEQ/L (ref 8–16)
AST SERPL-CCNC: 19 U/L (ref 5–40)
BACTERIA: NORMAL
BASOPHILS # BLD: 0.7 %
BASOPHILS ABSOLUTE: 0 THOU/MM3 (ref 0–0.1)
BILIRUB SERPL-MCNC: 0.5 MG/DL (ref 0.3–1.2)
BILIRUBIN URINE: NEGATIVE
BLOOD, URINE: NEGATIVE
BUN BLDV-MCNC: 14 MG/DL (ref 7–22)
CALCIUM SERPL-MCNC: 9.7 MG/DL (ref 8.5–10.5)
CASTS: NORMAL /LPF
CASTS: NORMAL /LPF
CHARACTER, URINE: NORMAL
CHLORIDE BLD-SCNC: 102 MEQ/L (ref 98–111)
CHOLESTEROL, TOTAL: 186 MG/DL (ref 100–199)
CO2: 30 MEQ/L (ref 23–33)
COLOR: YELLOW
CREAT SERPL-MCNC: 0.5 MG/DL (ref 0.4–1.2)
CRYSTALS: NORMAL
EOSINOPHIL # BLD: 2 %
EOSINOPHILS ABSOLUTE: 0.1 THOU/MM3 (ref 0–0.4)
EPITHELIAL CELLS, UA: NORMAL /HPF
ERYTHROCYTE [DISTWIDTH] IN BLOOD BY AUTOMATED COUNT: 12.9 % (ref 11.5–14.5)
ERYTHROCYTE [DISTWIDTH] IN BLOOD BY AUTOMATED COUNT: 41.9 FL (ref 35–45)
GFR SERPL CREATININE-BSD FRML MDRD: > 90 ML/MIN/1.73M2
GLUCOSE BLD-MCNC: 94 MG/DL (ref 70–108)
GLUCOSE, URINE: NEGATIVE MG/DL
HCT VFR BLD CALC: 46.5 % (ref 37–47)
HDLC SERPL-MCNC: 77 MG/DL
HEMOGLOBIN: 15 GM/DL (ref 12–16)
IMMATURE GRANS (ABS): 0.01 THOU/MM3 (ref 0–0.07)
IMMATURE GRANULOCYTES: 0.2 %
KETONES, URINE: NEGATIVE
LDL CHOLESTEROL CALCULATED: 95 MG/DL
LEUKOCYTE ESTERASE, URINE: NEGATIVE
LYMPHOCYTES # BLD: 19.1 %
LYMPHOCYTES ABSOLUTE: 1.1 THOU/MM3 (ref 1–4.8)
MCH RBC QN AUTO: 28.7 PG (ref 26–33)
MCHC RBC AUTO-ENTMCNC: 32.3 GM/DL (ref 32.2–35.5)
MCV RBC AUTO: 88.9 FL (ref 81–99)
MISCELLANEOUS LAB TEST RESULT: NORMAL
MONOCYTES # BLD: 9.9 %
MONOCYTES ABSOLUTE: 0.6 THOU/MM3 (ref 0.4–1.3)
NITRITE, URINE: NEGATIVE
NUCLEATED RED BLOOD CELLS: 0 /100 WBC
PH UA: 8 (ref 5–9)
PLATELET # BLD: 245 THOU/MM3 (ref 130–400)
PMV BLD AUTO: 10 FL (ref 9.4–12.4)
POTASSIUM SERPL-SCNC: 3.8 MEQ/L (ref 3.5–5.2)
PROTEIN UA: NEGATIVE MG/DL
RBC # BLD: 5.23 MILL/MM3 (ref 4.2–5.4)
RBC URINE: NORMAL /HPF
RENAL EPITHELIAL, UA: NORMAL
SEG NEUTROPHILS: 68.1 %
SEGMENTED NEUTROPHILS ABSOLUTE COUNT: 4 THOU/MM3 (ref 1.8–7.7)
SODIUM BLD-SCNC: 142 MEQ/L (ref 135–145)
SPECIFIC GRAVITY UA: 1.01 (ref 1–1.03)
TOTAL PROTEIN: 7.7 G/DL (ref 6.1–8)
TRIGL SERPL-MCNC: 72 MG/DL (ref 0–199)
TSH SERPL DL<=0.05 MIU/L-ACNC: 1.18 UIU/ML (ref 0.4–4.2)
UROBILINOGEN, URINE: 0.2 EU/DL (ref 0–1)
VITAMIN D 25-HYDROXY: 27 NG/ML (ref 30–100)
WBC # BLD: 5.9 THOU/MM3 (ref 4.8–10.8)
WBC UA: NORMAL /HPF
YEAST: NORMAL

## 2021-09-09 DIAGNOSIS — E55.9 VITAMIN D INSUFFICIENCY: Primary | ICD-10-CM

## 2021-09-23 ENCOUNTER — TELEPHONE (OUTPATIENT)
Dept: FAMILY MEDICINE CLINIC | Age: 61
End: 2021-09-23

## 2021-09-23 ENCOUNTER — NURSE ONLY (OUTPATIENT)
Dept: FAMILY MEDICINE CLINIC | Age: 61
End: 2021-09-23

## 2021-09-23 DIAGNOSIS — Z11.59 SCREENING FOR VIRAL DISEASE: Primary | ICD-10-CM

## 2021-09-23 DIAGNOSIS — R43.9 UNSPECIFIED DISTURBANCES OF SMELL AND TASTE: Primary | ICD-10-CM

## 2021-09-23 DIAGNOSIS — Z11.59 SCREENING FOR VIRAL DISEASE: ICD-10-CM

## 2021-09-23 NOTE — TELEPHONE ENCOUNTER
Congestion, runny nose that started Monday. Patient woke up and unable to taste anything this morning. Patient is currently managing symptoms with OTC meds. Covid test ordered for patient. Instructions given to proceed to [facility] for testing. No additional questions at this time.

## 2021-09-25 LAB
SARS-COV-2: DETECTED
SOURCE: ABNORMAL

## 2021-10-01 ENCOUNTER — VIRTUAL VISIT (OUTPATIENT)
Dept: FAMILY MEDICINE CLINIC | Age: 61
End: 2021-10-01

## 2021-10-01 DIAGNOSIS — U07.1 COVID: Primary | ICD-10-CM

## 2021-10-01 PROCEDURE — 3017F COLORECTAL CA SCREEN DOC REV: CPT | Performed by: FAMILY MEDICINE

## 2021-10-01 PROCEDURE — G8428 CUR MEDS NOT DOCUMENT: HCPCS | Performed by: FAMILY MEDICINE

## 2021-10-01 PROCEDURE — 99213 OFFICE O/P EST LOW 20 MIN: CPT | Performed by: FAMILY MEDICINE

## 2021-10-01 NOTE — PROGRESS NOTES
10/1/2021    TELEHEALTH EVALUATION -- Audio/Visual (During TQJWA- public health emergency)    HPI:    Isabel Sauer (:  1960) has requested an audio/video evaluation for the following concern(s):    Symptoms of covid on the . Chest congestion, chills, body aching, problems breathing, then the  loss smell and taste sense and went to the ER. She tested positive Thursday the . She quarentene, and has been feeling stable and a little better each day. Her quarentene was over Wednesday, 2 days ago, so yesterday she went out to the hospital to visit her mother who also has Covi. She stayed 2 hours out of the house. After she came back she felt fine until this morning that she felt pretty bad again, chilly, warm, nauseous, no appetite and very fatigued. Review of Systems    Prior to Visit Medications    Medication Sig Taking? Authorizing Provider   lovastatin (MEVACOR) 20 MG tablet TAKE ONE TABLET BY MOUTH NIGHTLY  DHIRAJ Araujo - CNP   GARLIC PO Take 1 tablet by mouth daily. Historical Provider, MD   Coenzyme Q10 (CO Q 10) 100 MG CAPS Take 1 capsule by mouth daily. Historical Provider, MD   Multiple Vitamins-Minerals (THERAPEUTIC MULTIVITAMIN-MINERALS) tablet Take 1 tablet by mouth daily. Historical Provider, MD   aspirin 81 MG EC tablet Take 1 tablet by mouth daily.   Patient taking differently: Take 81 mg by mouth daily Taking every other day, due to excessive bruising  Tenzin Bond MD       Social History     Tobacco Use    Smoking status: Former Smoker     Packs/day: 0.25     Years: 5.00     Pack years: 1.25     Types: Cigarettes     Quit date: 10/12/1985     Years since quittin.9    Smokeless tobacco: Never Used   Vaping Use    Vaping Use: Never used   Substance Use Topics    Alcohol use: No    Drug use: No        Past Medical History:   Diagnosis Date    Hyperlipidemia 2013    Jerome Flight    Migraine     TMJ (dislocation of temporomandibular joint) 10/12/2016    ED visit       PHYSICAL EXAMINATION:    Constitutional: [x] Appears well-developed and well-nourished [x] No apparent distress, but looks tired        Mental status  [x] Alert and awake  [x] Oriented to person/place/time [x]Able to follow commands      Eyes:  EOM    [x]  Normal    Sclera  [x]  Normal      HENT:   [x] Normocephalic, atraumatic.  l   [x] Mouth/Throat: Mucous membranes mildly dry. External Ears [x] Normal  [] Abnormal-     Neck: [x] No visualized mass     Pulmonary/Chest: [x] Respiratory effort normal.  [x] No visualized signs of difficulty breathing or respiratory distress          Musculoskeletal:    [x] Normal range of motion of neck                 Psychiatric:       [x] Normal Affect      ASSESSMENT/PLAN:    1. COVID  Relative rest  Respiratory exercises  Get a pulse Ox  Mucinex 1200 mg po bid  Plenty of fuids      Return in about 4 days (around 10/5/2021). Emily Li, was evaluated through a synchronous (real-time) audio-video encounter. The patient (or guardian if applicable) is aware that this is a billable service. Verbal consent to proceed has been obtained within the past 12 months. The visit was conducted pursuant to the emergency declaration under the [address] [place] [address] authority and the [employer] General Act. Patient identification was verified, and a caregiver was present when appropriate. The patient was located in a state where the provider was credentialed to provide care. Total time spent on this encounter: Not billed by time    --Priyanka Pelletier MD on 10/1/2021 at 10:03 AM    An electronic signature was used to authenticate this note.

## 2021-10-05 ENCOUNTER — VIRTUAL VISIT (OUTPATIENT)
Dept: FAMILY MEDICINE CLINIC | Age: 61
End: 2021-10-05

## 2021-10-05 DIAGNOSIS — U07.1 COVID: Primary | ICD-10-CM

## 2021-10-05 DIAGNOSIS — R53.83 OTHER FATIGUE: ICD-10-CM

## 2021-10-05 PROCEDURE — 99212 OFFICE O/P EST SF 10 MIN: CPT | Performed by: FAMILY MEDICINE

## 2021-10-05 PROCEDURE — G8428 CUR MEDS NOT DOCUMENT: HCPCS | Performed by: FAMILY MEDICINE

## 2021-10-05 PROCEDURE — 3017F COLORECTAL CA SCREEN DOC REV: CPT | Performed by: FAMILY MEDICINE

## 2021-10-05 RX ORDER — ONDANSETRON 4 MG/1
4 TABLET, ORALLY DISINTEGRATING ORAL EVERY 8 HOURS PRN
Qty: 20 TABLET | Refills: 0 | Status: SHIPPED | OUTPATIENT
Start: 2021-10-05 | End: 2022-05-10

## 2021-10-05 NOTE — PROGRESS NOTES
10/5/2021    TELEHEALTH EVALUATION -- Audio/Visual (During PQM-33 public health emergency)    HPI:    Shauna Lira (:  1960) has requested an audio/video evaluation for the following concern(s): follow up covid    Feeling rough, fatigue, nauseated. Pulse oximeter has been above 94% and today 97%. Blood pressure has been 100 oh . This afternoon she is going to go to her daughter's house for couple days. Review of Systems:  Positive for very fatigued. Positive for nausea. Positive for decreased appetite. Negative for vomiting. Prior to Visit Medications    Medication Sig Taking? Authorizing Provider   ondansetron (ZOFRAN ODT) 4 MG disintegrating tablet Take 1 tablet by mouth every 8 hours as needed for Nausea or Vomiting Yes Nafisa Allison MD   lovastatin (MEVACOR) 20 MG tablet TAKE ONE TABLET BY MOUTH NIGHTLY  DHIRAJ Sepulveda - CNP   GARLIC PO Take 1 tablet by mouth daily. Historical Provider, MD   Coenzyme Q10 (CO Q 10) 100 MG CAPS Take 1 capsule by mouth daily. Historical Provider, MD   Multiple Vitamins-Minerals (THERAPEUTIC MULTIVITAMIN-MINERALS) tablet Take 1 tablet by mouth daily. Historical Provider, MD   aspirin 81 MG EC tablet Take 1 tablet by mouth daily.   Patient taking differently: Take 81 mg by mouth daily Taking every other day, due to excessive bruising  Doyle Gross MD       Social History     Tobacco Use    Smoking status: Former Smoker     Packs/day: 0.25     Years: 5.00     Pack years: 1.25     Types: Cigarettes     Quit date: 10/12/1985     Years since quittin.0    Smokeless tobacco: Never Used   Vaping Use    Vaping Use: Never used   Substance Use Topics    Alcohol use: No    Drug use: No        Past Medical History:   Diagnosis Date    Hyperlipidemia 2013    Duard Shi    Migraine     TMJ (dislocation of temporomandibular joint) 10/12/2016    ED visit       PHYSICAL EXAMINATION:    Vital Signs: (As obtained by patient/caregiver or practitioner observation)     Pulse oximetry- 97%    Constitutional: [x] Appears well-developed and well-nourished [x] No apparent distress, looks tired     Mental status  [x] Alert and awake  [x] Oriented to person/place/time [x]Able to follow commands      Eyes:  EOM    [x]  Normal  -    HENT:   [x] Normocephalic, atraumatic. [x][x]Mouth/Throat: Mucous membranes are moist.     External Ears [x] Normal  [] Abnormal-     Neck: [x] No visualized mass     Pulmonary/Chest: [x] Respiratory effort normal.  [x] No visualized signs of difficulty breathing or respiratory distress                 Skin:        [x] No significant exanthematous lesions or discoloration noted on facial skin              Psychiatric:       [x] Normal Affect     ASSESSMENT/PLAN:    1. COVID  Good idea that she is going to be with her daughter at this time. Continue good hydration. Relative rest.  Continue respiratory exercises. 2. Other fatigue        Return in about 1 week (around 10/12/2021). Alex Kawasaki, was evaluated through a synchronous (real-time) audio-video encounter. The patient (or guardian if applicable) is aware that this is a billable service. Verbal consent to proceed has been obtained within the past 12 months. The visit was conducted pursuant to the emergency declaration under the [address] [place] [address] and the [employer] General Act. Patient identification was verified, and a caregiver was present when appropriate. The patient was located in a state where the provider was credentialed to provide care. Total time spent on this encounter: Not billed by time    --Bebeto Gaston MD on 10/11/2021 at 1:26 AM    An electronic signature was used to authenticate this note.

## 2021-12-14 ENCOUNTER — HOSPITAL ENCOUNTER (OUTPATIENT)
Dept: WOMENS IMAGING | Age: 61
Discharge: HOME | End: 2021-12-14
Payer: MEDICAID

## 2021-12-14 DIAGNOSIS — Z09 FOLLOW-UP EXAM: ICD-10-CM

## 2021-12-14 DIAGNOSIS — R92.1 BREAST CALCIFICATIONS: ICD-10-CM

## 2021-12-14 PROCEDURE — G0279 TOMOSYNTHESIS, MAMMO: HCPCS

## 2022-01-18 ENCOUNTER — OFFICE VISIT (OUTPATIENT)
Dept: FAMILY MEDICINE CLINIC | Age: 62
End: 2022-01-18
Payer: MEDICAID

## 2022-01-18 VITALS
WEIGHT: 118 LBS | BODY MASS INDEX: 18.96 KG/M2 | SYSTOLIC BLOOD PRESSURE: 135 MMHG | HEIGHT: 66 IN | TEMPERATURE: 97.8 F | DIASTOLIC BLOOD PRESSURE: 65 MMHG | RESPIRATION RATE: 14 BRPM | HEART RATE: 71 BPM

## 2022-01-18 DIAGNOSIS — E78.00 HYPERCHOLESTEROLEMIA: ICD-10-CM

## 2022-01-18 DIAGNOSIS — R20.2 PARESTHESIA OF BILATERAL LEGS: ICD-10-CM

## 2022-01-18 DIAGNOSIS — L98.9 SKIN LESION OF BACK: Primary | ICD-10-CM

## 2022-01-18 PROCEDURE — 99214 OFFICE O/P EST MOD 30 MIN: CPT | Performed by: NURSE PRACTITIONER

## 2022-01-18 PROCEDURE — G8482 FLU IMMUNIZE ORDER/ADMIN: HCPCS | Performed by: NURSE PRACTITIONER

## 2022-01-18 PROCEDURE — G8420 CALC BMI NORM PARAMETERS: HCPCS | Performed by: NURSE PRACTITIONER

## 2022-01-18 PROCEDURE — 3017F COLORECTAL CA SCREEN DOC REV: CPT | Performed by: NURSE PRACTITIONER

## 2022-01-18 PROCEDURE — G8427 DOCREV CUR MEDS BY ELIG CLIN: HCPCS | Performed by: NURSE PRACTITIONER

## 2022-01-18 PROCEDURE — 1036F TOBACCO NON-USER: CPT | Performed by: NURSE PRACTITIONER

## 2022-01-18 RX ORDER — LOVASTATIN 20 MG/1
TABLET ORAL
Qty: 90 TABLET | Refills: 1 | Status: SHIPPED | OUTPATIENT
Start: 2022-01-18 | End: 2022-08-31 | Stop reason: SDUPTHER

## 2022-01-18 NOTE — PROGRESS NOTES
Nacho Self (:  1960) is a 63 y.o. female,Established patient, here for evaluation of the following chief complaint(s): Mole (Two spots on back - 1. Dark spot 2. Red sore) and Numbness (Numbness in both legs from knee down into feet )         ASSESSMENT/PLAN:  1. Skin lesion of back  2. Paresthesia of bilateral legs  3. Hypercholesterolemia  -     lovastatin (MEVACOR) 20 MG tablet; TAKE ONE TABLET BY MOUTH NIGHTLY, Disp-90 tablet, R-1Normal      Return in about 7 weeks (around 3/7/2022). Subjective   SUBJECTIVE/OBJECTIVE:  HPI    Lesion on her back, black in color, noticed it 3 weeks ago. Difficult for her to see, so she had her daughter look at it. Then she scratched her back in a different a few days ago and it was painful, so she thinks she scratched a mole off. Numbness in both feet and legs, up to her knees. She has noticed it for about a week. 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 The outer, lower right leg aches also, but has been that way for a long time. Not at night, not with walking. Notices mostly with sitting. Heat helps. She also has had tingling in her left hand. Has a hx of cervical neck degenerative changes. Hyperlipidemia; low cholesterol diet, walks 10k-12k steps per day. Lovastatin 20 mg daily. Lipid panel in 2021 was within acceptable limits. Review of Systems   Musculoskeletal: Positive for myalgias (Right outer lower leg). Skin: Positive for wound (Back lesion x2). Neurological: Positive for numbness (bilat feet, lower legs, intermittent). Objective   Physical Exam  Vitals reviewed. Constitutional:       General: She is not in acute distress. Appearance: She is well-developed. HENT:      Head: Normocephalic. Right Ear: External ear normal.      Left Ear: External ear normal.      Nose: Nose normal.      Mouth/Throat:      Pharynx: No oropharyngeal exudate.    Eyes: Conjunctiva/sclera: Conjunctivae normal.      Pupils: Pupils are equal, round, and reactive to light. Neck:      Thyroid: No thyromegaly. Cardiovascular:      Rate and Rhythm: Normal rate and regular rhythm. Heart sounds: Normal heart sounds. No murmur heard. No friction rub. No gallop. Pulmonary:      Effort: Pulmonary effort is normal. No respiratory distress. Breath sounds: Normal breath sounds. No wheezing or rales. Musculoskeletal:         General: Normal range of motion. Cervical back: Normal range of motion and neck supple. Lumbar back: Negative right straight leg raise test and negative left straight leg raise test.   Lymphadenopathy:      Cervical: No cervical adenopathy. Skin:     General: Skin is warm and dry. Capillary Refill: Capillary refill takes less than 2 seconds. Neurological:      Mental Status: She is alert and oriented to person, place, and time. Psychiatric:         Behavior: Behavior normal.         Thought Content: Thought content normal.         Judgment: Judgment normal.     right upper back, painful after scratching       Left lower/mid back. An electronic signature was used to authenticate this note.     --DHIRAJ Ash - CNP

## 2022-01-18 NOTE — PATIENT INSTRUCTIONS
Naproxen, 2 tabs twice daily for a few days to relieve inflammation in the low back  Exercises at least daily  New chair for reading and math groups. Take picture of back lesions monthly with measuring stick as reference for size. Follow up 3/7/22  Months, but let office know sooner if you notice change in size, shape, border, or color. You may receive a survey about your visit with us today. The feedback from our patients helps us identify what is working well and where the service to all patients can be enhanced. Thank you! Low Back Arthritis: Exercises  Introduction  Here are some examples of typical rehabilitation exercises for your condition. Start each exercise slowly. Ease off the exercise if you start to have pain. Your doctor or physical therapist will tell you when you can start these exercises and which ones will work best for you. When you are not being active, find a comfortable position for rest. Some people are comfortable on the floor or a medium-firm bed with a small pillow under their head and another under their knees. Some people prefer to lie on their side with a pillow between their knees. Don't stay in one position for too long. Take short walks (10 to 20 minutes) every 2 to 3 hours. Avoid slopes, hills, and stairs until you feel better. Walk only distances you can manage without pain, especially leg pain. How to do the exercises  Pelvic tilt   1. Lie on your back with your knees bent. 2. \"Brace\" your stomach--tighten your muscles by pulling in and imagining your belly button moving toward your spine. 3. Press your lower back into the floor. You should feel your hips and pelvis rock back. 4. Hold for 6 seconds while breathing smoothly. 5. Relax and allow your pelvis and hips to rock forward. 6. Repeat 8 to 12 times. Back stretches   1. Get down on your hands and knees on the floor. 2. Relax your head and allow it to droop.  Round your back up toward the ceiling until you feel a nice stretch in your upper, middle, and lower back. Hold this stretch for as long as it feels comfortable, or about 15 to 30 seconds. 3. Return to the starting position with a flat back while you are on your hands and knees. 4. Let your back sway by pressing your stomach toward the floor. Lift your buttocks toward the ceiling. 5. Hold this position for 15 to 30 seconds. 6. Repeat 2 to 4 times. Therapeutic Ball: Back Exercises  Introduction  Here are some examples of typical exercises for your condition. Start each exercise slowly. Ease off the exercise if you start to have pain. Your doctor or physical therapist will tell you when you can start these exercises and which ones will work best for you. To prepare, make sure that your ball is the right size for you. When inflated and firm, it should allow you to sit with your hips and knees bent at about a 90-degree angle (like the letter L). How to do the exercises      Seated position on ball   1. Use this exercise to get used to moving on the ball and to find your best sitting position. 2. Sit comfortably on the ball with your feet about hip-width apart. If you feel unsteady, rest your hands on the ball near your hips. 3. As you do this exercise, try to keep your shoulders and upper body relaxed and still. 4. Using your stomach and back muscles to move your pelvis, roll the ball forward. This will round your back. 5. Still using your stomach and back muscles, roll the ball back. You will arch your back. 6. Repeat this rounding-arching motion a few times. 7. Stop in between the two positions, where your back is not rounded or arched. This is called your neutral position. Pelvic rotation   1. Sit tall on the ball. 2. Slowly rotate your hips in a [place] pattern. Keep the movement focused at your hips. 3. Repeat, but [place] in the other direction. 4. Repeat 8 to 12 times. Postural sitting   1.  Use this position to find a stable, relaxed posture on the ball. You can use this position as your starting point for other ball exercises. If you feel unsteady on the ball, start on a chair first.  2. Sit on a ball or chair, with your feet planted straight in front of you. 3. Imagine that a string at the top of your head is pulling you straight up. Think of yourself as 2 inches taller than you are.  4. Slightly tuck your chin. 5. Keep your shoulders back and relaxed. Knee extension   1. Sit tall on the ball with your feet planted in front of you, hip-width apart. As you do this exercise, avoid slumping your shoulders and arching your back. 2. Rest your hands on the ball near your hip or a steady object next to you. (If you feel very stable on the ball, rest your hands in your lap or at your side.)  3. Slowly straighten one leg at the knee. Slowly lower it back down. Repeat with the other leg. 4. Repeat this exercise 8 to 12 times. Roll-ups   1. Lie on your back with your knees bent, feet resting on the floor. 2. Lay the ball on your thighs. Rest your hands up high on the ball. 3. Raising your head and shoulder blades, roll the ball up your thighs. Exhale as you roll up. 4. If this is hard on your neck, gently support your lower head and upper neck with one hand. Don't use that hand to pull your head up. 5. Repeat 8 to 12 times. Ball curls   1. Lie on your back with your ankles resting on the ball, knees straight. 2. Use your legs to roll the exercise ball toward you. Allow your knees to bend and move closer to your chest.  3. Pause briefly, and then roll the ball to the starting position. Try to keep the ball rolling straight. You will feel the muscles in your lower belly working. 4. Repeat 8 to 12 times. Bridge with ball under legs   1. Lie on your back with your legs up, calves resting on the ball. For more challenge, rest your heels on the ball. 2. Look up at the ceiling, and keep your chin relaxed.  You can place a small pillow under your head or neck for comfort. 3. With your arms by your side, press your hands onto the floor for stability. 4. Tighten your belly muscles by pulling in your belly button toward your spine. 5. Push your heels down toward the floor, squeeze your buttocks, and lift your hips off the floor until your shoulders, hips, and knees are all in a straight line. 6. Try to keep the ball steady. Hold for about 6 seconds as you continue to breathe normally. 7. Slowly lower your hips back down to the floor. 8. Repeat 8 to 12 times. Ball curls with bridge   1. Start flat on your back with your ankles resting on the ball. 2. Look up at the ceiling, and keep your chin relaxed. You can place a small pillow under your head or neck for comfort. 3. With your arms by your side, press your hands onto the floor for stability. 4. Tighten your belly muscles by pulling in your belly button toward your spine. 5. Push your heels down toward the floor, squeeze your buttocks, and lift your hips off the floor until your shoulders, hips, and knees are all in a straight line. 6. While holding the bridge position, roll the ball toward you with your heels. Keep your hips as level as you can. 7. Pause briefly, and then roll the ball back out. Try to keep the ball rolling straight. You will feel the muscles in your lower belly working as you straighten your legs. 8. Lower your hips, and return to your starting position. 9. Repeat 8 to 12 times. 10. When you can keep your body and the ball steady throughout this exercise, you're ready for more challenge. Try keeping your hips raised while rolling the ball out, holding the bridge, and rolling back, a few times in a row. Praying katia   1. Kneel upright with the ball in front of you. 2. To start, clasp your hands together. Rest them on the ball in front of you. 3. As you do this exercise, keep your back and hips straight and tighten your belly and buttocks muscles.  Keep your knees in place. 4. Press on the ball with your arms. Lean forward from the knees. This rolls the ball forward. You will bear most of your weight on your arms. 5. If your back starts to ache, you've gone too far. Pull back a bit. 6. Roll back to the start position. 7. Repeat 8 to 12 times. Walk-out plank on ball   1. Kneel over the ball. Place your hands on the floor in front of you. 2. Walk your hands forward until your legs are straight on the ball. This is the plank position. 3. When in plank position, hold your body straight and tighten your belly and buttocks muscles. Keep your chin slightly tucked. 4. Roll as far forward as you can without losing your balance or letting your hips drop. You may stop with the ball under your thighs, or even under your knees or shins. 5. Hold a few seconds, then walk your hands back and return to the start position. 6. Repeat 8 to 12 times. Push-up with thighs on ball   1. Kneel over the ball. Place your hands on the floor in front of you. 2. Walk your hands forward until your legs are straight on the ball. This is the plank position. 3. When in plank position, hold your body straight and tighten your belly and buttocks muscles. Keep your chin slightly tucked. 4. Roll as far forward as you can without losing your balance or letting your hips drop. You may stop with the ball under your thighs, or even under your knees or shins. 5. Bend your elbows. Slowly lower your body toward the ground as far as you can without losing your balance. 6. If your wrists hurt, try moving your hands a little farther apart so they're not right under your shoulders. 7. Slowly straighten your arms. 8. Do 8 to 12 of these push-ups. Wall squat with ball   1. Stand facing away from a wall. Place your feet about shoulder-width apart. 2. Place the ball between your middle back and the wall.  Move your feet out in front of you so they are about a foot in front of your hips.  3. Keep your arms at your sides, or put your hands on your hips. 4. Slowly squat down as if you are going to sit in a chair, rolling your back over the ball as you squat. The ball should move with you but stay pressed into the wall. 5. Be sure that your knees do not go in front of your toes as you squat. 6. Hold for 6 seconds. 7. Slowly rise to your standing position. 8. Repeat 8 to 12 times. Child's pose with ball   1. Kneeling upright with your back straight, rest your hands on the ball in front of you. 2. Breathe out as you bend at the hips, and roll the ball forward. Lower your chest toward the ground, and drop your hips back toward your heels. 3. To stretch your upper back and shoulders, hold this position for 15 to 30 seconds. 4. Repeat 2 to 4 times.

## 2022-01-26 ENCOUNTER — VIRTUAL VISIT (OUTPATIENT)
Dept: FAMILY MEDICINE CLINIC | Age: 62
End: 2022-01-26
Payer: MEDICAID

## 2022-01-26 ENCOUNTER — NURSE ONLY (OUTPATIENT)
Dept: FAMILY MEDICINE CLINIC | Age: 62
End: 2022-01-26
Payer: MEDICAID

## 2022-01-26 DIAGNOSIS — R39.15 URINARY URGENCY: ICD-10-CM

## 2022-01-26 DIAGNOSIS — N30.01 ACUTE CYSTITIS WITH HEMATURIA: Primary | ICD-10-CM

## 2022-01-26 DIAGNOSIS — R35.0 URINARY FREQUENCY: Primary | ICD-10-CM

## 2022-01-26 LAB
BILIRUBIN, POC: NEGATIVE
BLOOD URINE, POC: NORMAL
CLARITY, POC: CLEAR
COLOR, POC: YELLOW
GLUCOSE URINE, POC: NEGATIVE
KETONES, POC: NEGATIVE
LEUKOCYTE EST, POC: NORMAL
NITRITE, POC: NEGATIVE
PH, POC: 7
PROTEIN, POC: NEGATIVE
SPECIFIC GRAVITY, POC: 1.01
UROBILINOGEN, POC: 0.2

## 2022-01-26 PROCEDURE — G8427 DOCREV CUR MEDS BY ELIG CLIN: HCPCS | Performed by: NURSE PRACTITIONER

## 2022-01-26 PROCEDURE — 3017F COLORECTAL CA SCREEN DOC REV: CPT | Performed by: NURSE PRACTITIONER

## 2022-01-26 PROCEDURE — 81003 URINALYSIS AUTO W/O SCOPE: CPT | Performed by: NURSE PRACTITIONER

## 2022-01-26 PROCEDURE — 99213 OFFICE O/P EST LOW 20 MIN: CPT | Performed by: NURSE PRACTITIONER

## 2022-01-26 RX ORDER — NITROFURANTOIN 25; 75 MG/1; MG/1
100 CAPSULE ORAL 2 TIMES DAILY
Qty: 10 CAPSULE | Refills: 0 | Status: SHIPPED | OUTPATIENT
Start: 2022-01-26 | End: 2022-01-31

## 2022-01-26 NOTE — PROGRESS NOTES
Vidal Cheung (:  1960) is a Established patient, here for evaluation of the following:    Assessment & Plan   Below is the assessment and plan developed based on review of pertinent history, physical exam, labs, studies, and medications. 1. Acute cystitis with hematuria  -     nitrofurantoin, macrocrystal-monohydrate, (MACROBID) 100 MG capsule; Take 1 capsule by mouth 2 times daily for 5 days, Disp-10 capsule, R-0Normal    Return if symptoms worsen or fail to improve. Subjective   HPI     Started  with frequency, dysuria. Drank water and it improved, but worse again this am. No pelvic or back, no fevers or chills. Visible blood in urine    Urine POC showed Large blood, large leukocytes- otherwise normal urine. Review of Systems   Constitutional: Negative for chills and fever. Genitourinary: Positive for dysuria, frequency and hematuria.           Objective   Patient-Reported Vitals  No data recorded     Physical Exam  [INSTRUCTIONS:  \"[x]\" Indicates a positive item  \"[]\" Indicates a negative item  -- DELETE ALL ITEMS NOT EXAMINED]    Constitutional: [x] Appears well-developed and well-nourished [x] No apparent distress      [] Abnormal -     Mental status: [x] Alert and awake  [x] Oriented to person/place/time [x] Able to follow commands    [] Abnormal -     Eyes:   EOM    [x]  Normal    [] Abnormal -   Sclera  [x]  Normal    [] Abnormal -          Discharge [x]  None visible   [] Abnormal -     HENT: [x] Normocephalic, atraumatic  [] Abnormal -   [x] Mouth/Throat: Mucous membranes are moist    External Ears [x] Normal  [] Abnormal -    Neck: [x] No visualized mass [] Abnormal -     Pulmonary/Chest: [x] Respiratory effort normal   [x] No visualized signs of difficulty breathing or respiratory distress        [] Abnormal -      Musculoskeletal:   [x] Normal gait with no signs of ataxia         [x] Normal range of motion of neck        [] Abnormal -     Neurological:        [x] No Facial Asymmetry (Cranial nerve 7 motor function) (limited exam due to video visit)          [x] No gaze palsy        [] Abnormal -          Skin:        [x] No significant exanthematous lesions or discoloration noted on facial skin         [] Abnormal -            Psychiatric:       [x] Normal Affect [] Abnormal -        [x] No Hallucinations    Other pertinent observable physical exam findings:-                 Philipp Settler, was evaluated through a synchronous (real-time) audio-video encounter. The patient (or guardian if applicable) is aware that this is a billable service, which includes applicable co-pays. This Virtual Visit was conducted with patient's (and/or legal guardian's) consent. The visit was conducted pursuant to the emergency declaration under the [address] [place] [address] authority and the [employer] General Act. Patient identification was verified, and a caregiver was present when appropriate. The patient was located at home in a state where the provider was licensed to provide care.        --DHIRAJ Ash - CNP

## 2022-01-28 LAB
ORGANISM: ABNORMAL
URINE CULTURE, ROUTINE: ABNORMAL

## 2022-03-02 ENCOUNTER — OFFICE VISIT (OUTPATIENT)
Dept: FAMILY MEDICINE CLINIC | Age: 62
End: 2022-03-02
Payer: MEDICAID

## 2022-03-02 VITALS
DIASTOLIC BLOOD PRESSURE: 61 MMHG | BODY MASS INDEX: 19.13 KG/M2 | WEIGHT: 119 LBS | HEIGHT: 66 IN | HEART RATE: 69 BPM | SYSTOLIC BLOOD PRESSURE: 131 MMHG

## 2022-03-02 DIAGNOSIS — G44.86 CERVICOGENIC HEADACHE: Primary | ICD-10-CM

## 2022-03-02 DIAGNOSIS — E55.9 VITAMIN D INSUFFICIENCY: ICD-10-CM

## 2022-03-02 DIAGNOSIS — R20.2 PARESTHESIA OF BOTH HANDS: ICD-10-CM

## 2022-03-02 DIAGNOSIS — M54.2 CERVICAL PAIN (NECK): ICD-10-CM

## 2022-03-02 PROCEDURE — G8420 CALC BMI NORM PARAMETERS: HCPCS | Performed by: NURSE PRACTITIONER

## 2022-03-02 PROCEDURE — 1036F TOBACCO NON-USER: CPT | Performed by: NURSE PRACTITIONER

## 2022-03-02 PROCEDURE — G8482 FLU IMMUNIZE ORDER/ADMIN: HCPCS | Performed by: NURSE PRACTITIONER

## 2022-03-02 PROCEDURE — 3017F COLORECTAL CA SCREEN DOC REV: CPT | Performed by: NURSE PRACTITIONER

## 2022-03-02 PROCEDURE — 99214 OFFICE O/P EST MOD 30 MIN: CPT | Performed by: NURSE PRACTITIONER

## 2022-03-02 PROCEDURE — G8427 DOCREV CUR MEDS BY ELIG CLIN: HCPCS | Performed by: NURSE PRACTITIONER

## 2022-03-02 RX ORDER — TIZANIDINE 2 MG/1
2 TABLET ORAL EVERY 8 HOURS PRN
Qty: 30 TABLET | Refills: 0 | Status: SHIPPED | OUTPATIENT
Start: 2022-03-02 | End: 2022-05-10

## 2022-03-02 ASSESSMENT — PATIENT HEALTH QUESTIONNAIRE - PHQ9
SUM OF ALL RESPONSES TO PHQ QUESTIONS 1-9: 2
2. FEELING DOWN, DEPRESSED OR HOPELESS: 1
SUM OF ALL RESPONSES TO PHQ QUESTIONS 1-9: 2
SUM OF ALL RESPONSES TO PHQ9 QUESTIONS 1 & 2: 2
1. LITTLE INTEREST OR PLEASURE IN DOING THINGS: 1

## 2022-03-02 NOTE — PROGRESS NOTES
Arslan Vo (:  1960) is a 63 y.o. female,Established patient, here for evaluation of the following chief complaint(s):  6 Month Follow-Up (vitamin D; spots on back; numbness in extremities )         ASSESSMENT/PLAN:  1. Cervicogenic headache  -     tiZANidine (ZANAFLEX) 2 MG tablet; Take 1 tablet by mouth every 8 hours as needed (neck pain), Disp-30 tablet, R-0Normal  -     MRI CERVICAL SPINE W WO CONTRAST; Future  2. Cervical pain (neck)  -     tiZANidine (ZANAFLEX) 2 MG tablet; Take 1 tablet by mouth every 8 hours as needed (neck pain), Disp-30 tablet, R-0Normal  -     MRI CERVICAL SPINE W WO CONTRAST; Future  3. Paresthesia of both hands  -     tiZANidine (ZANAFLEX) 2 MG tablet; Take 1 tablet by mouth every 8 hours as needed (neck pain), Disp-30 tablet, R-0Normal  -     MRI CERVICAL SPINE W WO CONTRAST; Future  4. Vitamin D insufficiency  -     Vitamin D 25 Hydroxy; Future      Return in about 6 weeks (around 2022). Subjective   SUBJECTIVE/OBJECTIVE:  HPI    Neck pain and headache for 2 days, but it is a chronic problem. She has associated dizziness and nausea. Aleve does not work. Takes Ibuprofen. Unsure of triggers. Sometimes ice works, sometimes she can find the exact spot to put pressure on. Did PT in 2019 for chronic cervical and upper thoracic pain. Fall/winter 2017, she fell asleep once crocheting and woke up with her chin on her chest, and since then, she has had neck issues. She also reports that if she moves her neck a certain way sometimes she will feel dizzy and off balance, which has been a chronic problem. Last visit was having numbness in both her feet and legs. 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 She was advised to sit in an appropriate chair, which has helped. She has had an episode of numbness in hands and feet randomly at home.     Saturday felt her heart \"skip a beat\", and then she found very weak and not well, the next day her legs felt very heavy, but symptoms resolved Tuesday. Feels ok today. Lesion on her back, black in color, around 1st of the year. Difficult for her to see, so she had her daughter look at it. Then she scratched her back in a different a few days later and it was painful, so she thinks she scratched a mole off. The spots to not look any different. Her daughter has been taking pics every so often for reference. Vitamin D insuffiencey. Level was 27 in Sept 2021. Taking Vit D 1000 IU for 3 Months. Review of Systems   Gastrointestinal: Positive for nausea. Musculoskeletal: Positive for neck pain. Neurological: Positive for dizziness and headaches. All other systems reviewed and are negative. Objective   Physical Exam  Vitals reviewed. Constitutional:       General: She is not in acute distress. Appearance: She is well-developed. HENT:      Head: Normocephalic. Right Ear: External ear normal.      Left Ear: External ear normal.      Nose: Nose normal.      Mouth/Throat:      Pharynx: No oropharyngeal exudate. Eyes:      Conjunctiva/sclera: Conjunctivae normal.      Pupils: Pupils are equal, round, and reactive to light. Neck:      Thyroid: No thyromegaly. Cardiovascular:      Rate and Rhythm: Normal rate and regular rhythm. Heart sounds: Normal heart sounds. No murmur heard. No friction rub. No gallop. Pulmonary:      Effort: Pulmonary effort is normal. No respiratory distress. Breath sounds: Normal breath sounds. No wheezing or rales. Abdominal:      General: There is no distension. Palpations: Abdomen is soft. There is no mass. Tenderness: There is no abdominal tenderness. There is no guarding. Musculoskeletal:         General: Normal range of motion. Cervical back: Normal range of motion and neck supple. Lymphadenopathy:      Cervical: No cervical adenopathy. Skin:     General: Skin is warm and dry.       Capillary Refill: Capillary refill takes less than 2 seconds. Neurological:      Mental Status: She is alert and oriented to person, place, and time. Psychiatric:         Behavior: Behavior normal.         Thought Content: Thought content normal.         Judgment: Judgment normal.                  An electronic signature was used to authenticate this note.     --DHIRAJ Puente - CNP

## 2022-03-03 ASSESSMENT — ENCOUNTER SYMPTOMS: NAUSEA: 1

## 2022-03-15 DIAGNOSIS — E78.00 HYPERCHOLESTEROLEMIA: ICD-10-CM

## 2022-03-15 RX ORDER — LOVASTATIN 20 MG/1
TABLET ORAL
Qty: 90 TABLET | Refills: 1 | OUTPATIENT
Start: 2022-03-15

## 2022-03-15 NOTE — PROGRESS NOTES
Venipuncture obtained from right arm. Patient tolerated the procedure without complication or complaint.
1

## 2022-03-17 DIAGNOSIS — E78.00 HYPERCHOLESTEROLEMIA: ICD-10-CM

## 2022-03-17 RX ORDER — LOVASTATIN 20 MG/1
TABLET ORAL
Qty: 90 TABLET | Refills: 1 | OUTPATIENT
Start: 2022-03-17

## 2022-04-13 ENCOUNTER — OFFICE VISIT (OUTPATIENT)
Dept: FAMILY MEDICINE CLINIC | Age: 62
End: 2022-04-13
Payer: MEDICAID

## 2022-04-13 VITALS
TEMPERATURE: 97.9 F | HEIGHT: 66 IN | SYSTOLIC BLOOD PRESSURE: 123 MMHG | BODY MASS INDEX: 19.03 KG/M2 | WEIGHT: 118.4 LBS | DIASTOLIC BLOOD PRESSURE: 60 MMHG | RESPIRATION RATE: 12 BRPM | HEART RATE: 64 BPM

## 2022-04-13 DIAGNOSIS — R03.0 ELEVATED BLOOD-PRESSURE READING WITHOUT DIAGNOSIS OF HYPERTENSION: ICD-10-CM

## 2022-04-13 DIAGNOSIS — R00.2 INTERMITTENT PALPITATIONS: ICD-10-CM

## 2022-04-13 DIAGNOSIS — R20.2 PARESTHESIA OF BOTH HANDS: Primary | ICD-10-CM

## 2022-04-13 PROCEDURE — G8420 CALC BMI NORM PARAMETERS: HCPCS | Performed by: NURSE PRACTITIONER

## 2022-04-13 PROCEDURE — G8427 DOCREV CUR MEDS BY ELIG CLIN: HCPCS | Performed by: NURSE PRACTITIONER

## 2022-04-13 PROCEDURE — 1036F TOBACCO NON-USER: CPT | Performed by: NURSE PRACTITIONER

## 2022-04-13 PROCEDURE — 3017F COLORECTAL CA SCREEN DOC REV: CPT | Performed by: NURSE PRACTITIONER

## 2022-04-13 PROCEDURE — 99214 OFFICE O/P EST MOD 30 MIN: CPT | Performed by: NURSE PRACTITIONER

## 2022-04-13 NOTE — PATIENT INSTRUCTIONS
You may receive a survey about your visit with us today. The feedback from our patients helps us identify what is working well and where the service to all patients can be enhanced. Thank you! · Check your blood pressure at least 2 times daily. Check while sitting down, feet flat on the ground, and arm at the level of your heart  · Please let us know if you blood pressure readings are consistently 130/80 or higher  · Limit your salt intake to 2 GM daily  · Drink plenty of water - half of  Your body weight in ounces daily  · Exercise - Aim for 30-45 minutes daily of aerobic exercise. Can run, jog, walk, swim, weight lift - anything to get the heart rate elevated. Can even break it up into 15 minutes sessions.

## 2022-04-13 NOTE — PROGRESS NOTES
Beverley Smart (:  1960) is a 63 y.o. female,Established patient, here for evaluation of the following chief complaint(s):  Follow-up, Neck Pain (continued neck pain ), Numbness (Numbness / tingling emtremities ), and Palpitations (Heart flutter occasionally recurrent )         ASSESSMENT/PLAN:  1. Paresthesia of both hands  -     Holter Monitor 48 Hour; Future  -     Basic Metabolic Panel; Future  -     Magnesium; Future  -     Vitamin B12; Future  2. Intermittent palpitations  -     Holter Monitor 48 Hour; Future  -     Basic Metabolic Panel; Future  -     Magnesium; Future  -     Vitamin B12; Future  3. Elevated blood-pressure reading without diagnosis of hypertension      Return in about 4 weeks (around 2022) for follow up BP, palpitations. .         Subjective   SUBJECTIVE/OBJECTIVE:  HPI    Last seen 3/2/22. Was having sensation of heart \"skipping a beat\", and then she found very weak and not well, the next day her legs felt very heavy, but symptoms resolved in a day or 2. Still feels occasional fluttering of heart, but does not feel any ill effects afterwards. Happens several times per week. Lasts only a few seconds. No chest pain or shortness of breath. /winter 2017, she fell asleep once crocheting and woke up with her chin on her chest, and since then, she has had neck issues. She also reports that if she moves her neck a certain way sometimes she will feel dizzy and off balance, which has been a chronic problem. Did PT in 2019 for chronic cervical and upper thoracic pain  Tries to keep moving, uses ice or heat. The paresthesias are very irregular. Can be effected by the chairs she sits in or the way she moves/sits. She is walking a lot, doing stretches for her back and neck. It is not as bad as it was 6 weeks. Review of Systems   Cardiovascular: Positive for palpitations (intermittent, random. ). Neurological: Positive for numbness (occasional, extremities). All other systems reviewed and are negative. Objective   Physical Exam  Vitals reviewed. Constitutional:       General: She is not in acute distress. Appearance: She is well-developed. HENT:      Head: Normocephalic. Right Ear: External ear normal.      Left Ear: External ear normal.      Nose: Nose normal.      Mouth/Throat:      Pharynx: No oropharyngeal exudate. Eyes:      Conjunctiva/sclera: Conjunctivae normal.      Pupils: Pupils are equal, round, and reactive to light. Neck:      Thyroid: No thyromegaly. Cardiovascular:      Rate and Rhythm: Normal rate and regular rhythm. Heart sounds: Normal heart sounds. No murmur heard. No friction rub. No gallop. Pulmonary:      Effort: Pulmonary effort is normal. No respiratory distress. Breath sounds: Normal breath sounds. No wheezing or rales. Abdominal:      General: There is no distension. Palpations: Abdomen is soft. There is no mass. Tenderness: There is no abdominal tenderness. There is no guarding. Musculoskeletal:         General: Normal range of motion. Cervical back: Normal range of motion and neck supple. Lymphadenopathy:      Cervical: No cervical adenopathy. Skin:     General: Skin is warm and dry. Capillary Refill: Capillary refill takes less than 2 seconds. Neurological:      Mental Status: She is alert and oriented to person, place, and time. Psychiatric:         Behavior: Behavior normal.         Thought Content: Thought content normal.         Judgment: Judgment normal.                  An electronic signature was used to authenticate this note.     --Kelton Cabezas, APRN - CNP

## 2022-04-19 ENCOUNTER — NURSE ONLY (OUTPATIENT)
Dept: LAB | Age: 62
End: 2022-04-19

## 2022-04-19 DIAGNOSIS — E55.9 VITAMIN D INSUFFICIENCY: ICD-10-CM

## 2022-04-19 DIAGNOSIS — R20.2 PARESTHESIA OF BOTH HANDS: ICD-10-CM

## 2022-04-19 DIAGNOSIS — R00.2 INTERMITTENT PALPITATIONS: ICD-10-CM

## 2022-04-19 LAB
ANION GAP SERPL CALCULATED.3IONS-SCNC: 11 MEQ/L (ref 8–16)
BUN BLDV-MCNC: 13 MG/DL (ref 7–22)
CALCIUM SERPL-MCNC: 9.5 MG/DL (ref 8.5–10.5)
CHLORIDE BLD-SCNC: 100 MEQ/L (ref 98–111)
CO2: 28 MEQ/L (ref 23–33)
CREAT SERPL-MCNC: 0.5 MG/DL (ref 0.4–1.2)
GFR SERPL CREATININE-BSD FRML MDRD: > 90 ML/MIN/1.73M2
GLUCOSE BLD-MCNC: 120 MG/DL (ref 70–108)
MAGNESIUM: 2.2 MG/DL (ref 1.6–2.4)
POTASSIUM SERPL-SCNC: 4 MEQ/L (ref 3.5–5.2)
SODIUM BLD-SCNC: 139 MEQ/L (ref 135–145)
VITAMIN B-12: 827 PG/ML (ref 211–911)
VITAMIN D 25-HYDROXY: 27 NG/ML (ref 30–100)

## 2022-04-25 ENCOUNTER — HOSPITAL ENCOUNTER (OUTPATIENT)
Age: 62
Discharge: HOME | End: 2022-04-25
Payer: MEDICAID

## 2022-04-25 ENCOUNTER — TELEPHONE (OUTPATIENT)
Dept: FAMILY MEDICINE CLINIC | Age: 62
End: 2022-04-25

## 2022-04-25 DIAGNOSIS — R00.2 PALPITATIONS: ICD-10-CM

## 2022-04-25 DIAGNOSIS — R94.31 ABNORMAL EKG: Primary | ICD-10-CM

## 2022-04-25 DIAGNOSIS — R00.2 PALPITATIONS: Primary | ICD-10-CM

## 2022-04-25 LAB
EKG ATRIAL RATE: 65 BPM
EKG P AXIS: -22 DEGREES
EKG P-R INTERVAL: 162 MS
EKG Q-T INTERVAL: 396 MS
EKG QRS DURATION: 90 MS
EKG QTC CALCULATION (BAZETT): 411 MS
EKG R AXIS: 41 DEGREES
EKG T AXIS: 69 DEGREES
EKG VENTRICULAR RATE: 65 BPM

## 2022-04-25 PROCEDURE — 93005 ELECTROCARDIOGRAM TRACING: CPT

## 2022-04-25 PROCEDURE — 93010 ELECTROCARDIOGRAM REPORT: CPT | Performed by: INTERNAL MEDICINE

## 2022-04-25 NOTE — TELEPHONE ENCOUNTER
Please order EKG diagnosis palpitations. Patient to keep track of heart rate and blood pressure twice daily until after Holter monitor. We will follow-up with results then.   For worsening symptoms or ongoing feeling of lightheadedness and fluttering, ER

## 2022-04-25 NOTE — TELEPHONE ENCOUNTER
----- Message from DHIRAJ Garza CNP sent at 4/25/2022  1:15 PM EDT -----  The patient's EKG does not show any acute changes, but it is different compared to July 2020. Please refer to cardiology for Abnormal EKG, palpitations.

## 2022-04-25 NOTE — TELEPHONE ENCOUNTER
Informed patient of provider response. Patient will get EKG completed today. Continued work scheduled Holter. And keep log as requested. No additional questions at this time.

## 2022-04-25 NOTE — TELEPHONE ENCOUNTER
Patient called office because she said at her last visit Eva Fagan CNP told her to contact us the next time she would have an episode. Patient woke up in the middle of the night not feeling well and have the heart fluttering feeling. She says today she feel very tired and weak. Just a general feeling of not feeling well. Patient is scheduled to have a 48 hr holter placed on 5/11/22. Advised the patient I would send a message to the provider for review and contact her back with instructions.

## 2022-04-25 NOTE — TELEPHONE ENCOUNTER
Kelton Cabezas, APRN - CNP   4/20/2022  8:38 AM EDT Back to Top        Most labs within acceptable limits.  Her vitamin D is still a little low at 27.  Recommend vitamin D 1000 IU 1 capsule daily she is not already taking any.  If she is already taking some vitamin C supplementation, she can double it. Her glucose is mildly elevated, please make sure she was not fasting.

## 2022-04-25 NOTE — TELEPHONE ENCOUNTER
Advised patient of provider results note. Verbalized understanding. Agreed to go ahead and place referral for [facility]. No additional questions at this time.

## 2022-04-27 ENCOUNTER — OFFICE VISIT (OUTPATIENT)
Dept: CARDIOLOGY CLINIC | Age: 62
End: 2022-04-27
Payer: MEDICAID

## 2022-04-27 VITALS
HEART RATE: 80 BPM | WEIGHT: 116 LBS | DIASTOLIC BLOOD PRESSURE: 82 MMHG | HEIGHT: 66 IN | SYSTOLIC BLOOD PRESSURE: 130 MMHG | BODY MASS INDEX: 18.64 KG/M2

## 2022-04-27 DIAGNOSIS — R94.31 ABNORMAL ECG: ICD-10-CM

## 2022-04-27 DIAGNOSIS — R00.2 PALPITATION: Primary | ICD-10-CM

## 2022-04-27 PROCEDURE — 3017F COLORECTAL CA SCREEN DOC REV: CPT | Performed by: NUCLEAR MEDICINE

## 2022-04-27 PROCEDURE — G8427 DOCREV CUR MEDS BY ELIG CLIN: HCPCS | Performed by: NUCLEAR MEDICINE

## 2022-04-27 PROCEDURE — G8420 CALC BMI NORM PARAMETERS: HCPCS | Performed by: NUCLEAR MEDICINE

## 2022-04-27 PROCEDURE — 99204 OFFICE O/P NEW MOD 45 MIN: CPT | Performed by: NUCLEAR MEDICINE

## 2022-04-27 PROCEDURE — 1036F TOBACCO NON-USER: CPT | Performed by: NUCLEAR MEDICINE

## 2022-04-27 ASSESSMENT — ENCOUNTER SYMPTOMS
NAUSEA: 0
ANAL BLEEDING: 0
BACK PAIN: 0
PHOTOPHOBIA: 0
COLOR CHANGE: 0
CHEST TIGHTNESS: 1
BLOOD IN STOOL: 0
ABDOMINAL DISTENTION: 0
VOMITING: 0
RECTAL PAIN: 0
CONSTIPATION: 0
ABDOMINAL PAIN: 0
SHORTNESS OF BREATH: 1
DIARRHEA: 0

## 2022-04-27 NOTE — PROGRESS NOTES
[address] [facility]  [facility] .  [address] [place] [id number]  Dept: [phone]  Dept Fax: [phone]  Loc: [phone]    Visit Date: 2022    Demetrice Morrison is a 63 y.o. female who presents todayfor:  Chief Complaint   Patient presents with    New Patient    Shortness of Breath    Palpitations   here for the first time  Has had palpitation for a while  Got worse lately   Associated weakness  Tiredness and fatigue  Some dizziness  Sent due to abnormal ECG   No known CAD before  Runs lower BP  Some fatigue with that   No chest pain per se  More heaviness   Some dyspnea on exertion   No known HTN or hyperlipidemia  No known DM   No smoking   Family history of CAD       HPI:  HPI  Past Medical History:   Diagnosis Date    Hyperlipidemia 2013    Diantha Chet    Migraine     TMJ (dislocation of temporomandibular joint) 10/12/2016    ED visit      Past Surgical History:   Procedure Laterality Date    APPENDECTOMY      COLONOSCOPY      [facility] STEROTACTIC LOC BREAST BIOPSY LEFT Left 2021    [facility] STEROTACTIC LOC BREAST BIOPSY LEFT 2021 [facility]    [place] COLON CA SCRN NOT  [address] IND N/A 7/3/2018    COLONOSCOPY performed by Salome Anne MD at [address]      at 13 yrs old     Family History   Problem Relation Age of Onset    Heart Attack Mother 59        due to medication    Other Brother         collitis    Heart Disease Brother     Heart Disease Brother      Social History     Tobacco Use    Smoking status: Former Smoker     Packs/day: 0.25     Years: 5.00     Pack years: 1.25     Types: Cigarettes     Quit date: 10/12/1985     Years since quittin.5    Smokeless tobacco: Never Used   Substance Use Topics    Alcohol use: No      Current Outpatient Medications   Medication Sig Dispense Refill    lovastatin (MEVACOR) 20 MG tablet TAKE ONE TABLET BY MOUTH NIGHTLY 90 tablet 1    GARLIC PO Take 1 tablet by mouth daily.  Coenzyme Q10 (CO Q 10) 100 MG CAPS Take 1 capsule by mouth daily.  Multiple Vitamins-Minerals (THERAPEUTIC MULTIVITAMIN-MINERALS) tablet Take 1 tablet by mouth daily.  aspirin 81 MG EC tablet Take 1 tablet by mouth daily. (Patient taking differently: Take 81 mg by mouth daily Taking every other day, due to excessive bruising) 30 tablet 3    tiZANidine (ZANAFLEX) 2 MG tablet Take 1 tablet by mouth every 8 hours as needed (neck pain) (Patient not taking: Reported on 4/27/2022) 30 tablet 0    ondansetron (ZOFRAN ODT) 4 MG disintegrating tablet Take 1 tablet by mouth every 8 hours as needed for Nausea or Vomiting (Patient not taking: Reported on 4/27/2022) 20 tablet 0     No current facility-administered medications for this visit. Allergies   Allergen Reactions    Amoxicillin      Health Maintenance   Topic Date Due    COVID-19 Vaccine (1) Never done    Cervical cancer screen  08/01/2022    Lipids  09/08/2022    Depression Screen  03/02/2023    Breast cancer screen  12/14/2023    DTaP/Tdap/Td vaccine (2 - Td or Tdap) 04/28/2025    Colorectal Cancer Screen  07/03/2028    Flu vaccine  Completed    Shingles Vaccine  Completed    Hepatitis C screen  Completed    HIV screen  Completed    Hepatitis A vaccine  Aged Out    Hepatitis B vaccine  Aged Out    Hib vaccine  Aged Out    Meningococcal (ACWY) vaccine  Aged Out    Pneumococcal 0-64 years Vaccine  Aged Out       Subjective:  Review of Systems   Constitutional: Positive for fatigue. HENT: Negative for ear pain and nosebleeds. Eyes: Negative for photophobia. Respiratory: Positive for chest tightness and shortness of breath. Cardiovascular: Positive for palpitations. Negative for chest pain and leg swelling. Gastrointestinal: Negative for abdominal distention, abdominal pain, anal bleeding, blood in stool, constipation, diarrhea, nausea, rectal pain and vomiting. Endocrine: Negative for polyphagia. Genitourinary: Negative for dysuria, hematuria and urgency. Musculoskeletal: Negative for arthralgias, back pain, gait problem, joint swelling, myalgias, neck pain and neck stiffness. Skin: Negative for color change, pallor and rash. Allergic/Immunologic: Negative for food allergies. Neurological: Positive for dizziness and light-headedness. Negative for syncope. Psychiatric/Behavioral: Negative for confusion, decreased concentration, dysphoric mood and hallucinations. The patient is not nervous/anxious and is not hyperactive. Objective:  Physical Exam  HENT:      Head: Normocephalic. Right Ear: Tympanic membrane normal.      Nose: Nose normal.      Mouth/Throat:      Mouth: Mucous membranes are moist.   Eyes:      Pupils: Pupils are equal, round, and reactive to light. Cardiovascular:      Rate and Rhythm: Normal rate and regular rhythm. Heart sounds: Murmur heard. No gallop. Pulmonary:      Effort: No respiratory distress. Breath sounds: No stridor. No wheezing, rhonchi or rales. Chest:      Chest wall: No tenderness. Abdominal:      General: There is no distension. Palpations: There is no mass. Tenderness: There is no abdominal tenderness. There is no right CVA tenderness, left CVA tenderness, guarding or rebound. Hernia: No hernia is present. Musculoskeletal:         General: No swelling, tenderness, deformity or signs of injury. Cervical back: Normal range of motion. Right lower leg: No edema. Left lower leg: No edema. Skin:     Coloration: Skin is not jaundiced or pale. Findings: No bruising, erythema, lesion or rash. Neurological:      Mental Status: She is alert and oriented to person, place, and time. Cranial Nerves: No cranial nerve deficit. Sensory: No sensory deficit. Motor: No weakness.       Coordination: Coordination normal.      Deep Tendon Reflexes: Reflexes normal.   Psychiatric:         Mood and Affect: Mood normal.         Thought Content: Thought content normal.       /82   Pulse 80   Ht 5' 6\" (1.676 m)   Wt 116 lb (52.6 kg)   BMI 18.72 kg/m²     Assessment:      Diagnosis Orders   1. Palpitation     2. Abnormal ECG     as above  Possible arrhythmia   Vs PACs and PVCs  Risk for CAD  No known CAD  Septal infarct on ECG       Plan:  No follow-ups on file. Discussed  Non invasive cardiac testing will be ordered to further evaluate for any ischemic or structural heart disease as a cause of the patient symptoms. We will proceed with a Stress Cardiolite test and echo soon. Continue risk factor modification and medical management  Thank you for allowing me to participate in the care of your patient. Please don't hesitate to contact me regarding any further issues related to the patient care    Orders Placed:  No orders of the defined types were placed in this encounter. Medications Prescribed:  No orders of the defined types were placed in this encounter. Discussed use, benefit, and side effects of prescribed medications. All patient questions answered. Pt voicedunderstanding. Instructed to continue current medications, diet and exercise. Continue risk factor modification and medical management. Patient agreed with treatment plan. Follow up as directed.     Electronically signedby Maria Guadalupe Hernández MD on 4/27/2022 at 7:38 AM

## 2022-05-10 ENCOUNTER — OFFICE VISIT (OUTPATIENT)
Dept: FAMILY MEDICINE CLINIC | Age: 62
End: 2022-05-10
Payer: MEDICAID

## 2022-05-10 ENCOUNTER — TELEPHONE (OUTPATIENT)
Dept: FAMILY MEDICINE CLINIC | Age: 62
End: 2022-05-10

## 2022-05-10 VITALS
WEIGHT: 117.4 LBS | HEART RATE: 66 BPM | BODY MASS INDEX: 18.87 KG/M2 | HEIGHT: 66 IN | SYSTOLIC BLOOD PRESSURE: 118 MMHG | DIASTOLIC BLOOD PRESSURE: 60 MMHG

## 2022-05-10 DIAGNOSIS — R20.2 PARESTHESIA OF BOTH HANDS: ICD-10-CM

## 2022-05-10 DIAGNOSIS — R00.2 PALPITATIONS: Primary | ICD-10-CM

## 2022-05-10 DIAGNOSIS — R94.31 ABNORMAL EKG: ICD-10-CM

## 2022-05-10 DIAGNOSIS — M54.2 CERVICAL PAIN (NECK): ICD-10-CM

## 2022-05-10 DIAGNOSIS — Z12.39 ENCOUNTER FOR OTHER SCREENING FOR MALIGNANT NEOPLASM OF BREAST: ICD-10-CM

## 2022-05-10 DIAGNOSIS — G44.86 CERVICOGENIC HEADACHE: Primary | ICD-10-CM

## 2022-05-10 PROCEDURE — 3017F COLORECTAL CA SCREEN DOC REV: CPT | Performed by: NURSE PRACTITIONER

## 2022-05-10 PROCEDURE — G8427 DOCREV CUR MEDS BY ELIG CLIN: HCPCS | Performed by: NURSE PRACTITIONER

## 2022-05-10 PROCEDURE — 99214 OFFICE O/P EST MOD 30 MIN: CPT | Performed by: NURSE PRACTITIONER

## 2022-05-10 PROCEDURE — G8420 CALC BMI NORM PARAMETERS: HCPCS | Performed by: NURSE PRACTITIONER

## 2022-05-10 PROCEDURE — 1036F TOBACCO NON-USER: CPT | Performed by: NURSE PRACTITIONER

## 2022-05-10 NOTE — PROGRESS NOTES
Benito Dotson (:  1960) is a 63 y.o. female,Established patient, here for evaluation of the following chief complaint(s):  1 Month Follow-Up (parathesia of both hands;  HTN; FATIGUE )         ASSESSMENT/PLAN:  1. Palpitations  2. Abnormal EKG  3. Cervical pain (neck)  4. Paresthesia of both hands  5. Encounter for other screening for malignant neoplasm of breast  -     [facility] DIGITAL SCREEN W OR WO CAD BILATERAL; Future      Return in about 4 months (around 9/10/2022). Subjective   SUBJECTIVE/OBJECTIVE:  HPI    Palpitations, ongoing for several months, worsening. Was having associated weakness and fatigue. Was really making her feel bad. Was referred to cardiology and saw . Plan is for stress test and echocardiogram, and place a 30 day event monitor. EKG showed septal infarct. Since she saw him, she has had 2 episodes, lasting a day or so. She feels full and does not want to eat when it happens. BP log from home is acceptable. Only a couple systolic readings below 281. Cervical spine pain: Fall/winter 2017, she fell asleep once crocheting and woke up with her chin on her chest, and since then, she has had neck issues.   She also reports that if she moves her neck a certain way sometimes she will feel dizzy and off balance, which has been a chronic problem. She had a massage once and it was very helpful, but she has abdominal muscle spasms laying on her stomach. Did PT in 2019 for chronic cervical and upper thoracic pain  Tries to keep moving, uses ice or heat. The paresthesias are very irregular. Can be effected by the chairs she sits in or the way she moves/sits. She is walking a lot, doing stretches for her back and neck. She is scheduled to have an MRI of the neck tomorrow. Abnormal screening mammogram 2021, in 2021 she underwent stereotactic biopsy for lesion on the left.   Pathology results showed lymphoma is doing static inflammation suggestive of ruptured cyst.  Follow-up mammogram in December showed no mammographic evidence of malignancy, and recommended to return to yearly screening mammography. Review of Systems   All other systems reviewed and are negative. Objective   Physical Exam  Vitals reviewed. Constitutional:       General: She is not in acute distress. Appearance: She is well-developed. HENT:      Head: Normocephalic. Right Ear: External ear normal.      Left Ear: External ear normal.      Nose: Nose normal.      Mouth/Throat:      Pharynx: No oropharyngeal exudate. Eyes:      Conjunctiva/sclera: Conjunctivae normal.      Pupils: Pupils are equal, round, and reactive to light. Neck:      Thyroid: No thyromegaly. Cardiovascular:      Rate and Rhythm: Normal rate and regular rhythm. Heart sounds: Normal heart sounds. No murmur heard. No friction rub. No gallop. Pulmonary:      Effort: Pulmonary effort is normal. No respiratory distress. Breath sounds: Normal breath sounds. No wheezing or rales. Abdominal:      General: There is no distension. Palpations: Abdomen is soft. There is no mass. Tenderness: There is no abdominal tenderness. There is no guarding. Musculoskeletal:         General: Normal range of motion. Cervical back: Normal range of motion and neck supple. Lymphadenopathy:      Cervical: No cervical adenopathy. Skin:     General: Skin is warm and dry. Capillary Refill: Capillary refill takes less than 2 seconds. Neurological:      Mental Status: She is alert and oriented to person, place, and time. Psychiatric:         Behavior: Behavior normal.         Thought Content: Thought content normal.         Judgment: Judgment normal.                  An electronic signature was used to authenticate this note.     --Nathaniel Barth, APRN - CNP

## 2022-05-10 NOTE — TELEPHONE ENCOUNTER
Central Scheduling from [address] [facility] called requesting a change of MRI order.    Need: MRI Cervical Spine wo contrast per Radiology

## 2022-05-11 ENCOUNTER — HOSPITAL ENCOUNTER (OUTPATIENT)
Dept: MRI IMAGING | Age: 62
Discharge: HOME | End: 2022-05-11
Payer: MEDICAID

## 2022-05-11 ENCOUNTER — TELEPHONE (OUTPATIENT)
Dept: FAMILY MEDICINE CLINIC | Age: 62
End: 2022-05-11

## 2022-05-11 ENCOUNTER — APPOINTMENT (OUTPATIENT)
Dept: NON INVASIVE DIAGNOSTICS | Age: 62
End: 2022-05-11
Payer: MEDICAID

## 2022-05-11 DIAGNOSIS — R20.2 PARESTHESIA OF BOTH HANDS: ICD-10-CM

## 2022-05-11 DIAGNOSIS — G44.86 CERVICOGENIC HEADACHE: ICD-10-CM

## 2022-05-11 PROCEDURE — 72141 MRI NECK SPINE W/O DYE: CPT

## 2022-05-11 NOTE — TELEPHONE ENCOUNTER
----- Message from DHIRAJ Rose CNP sent at 5/11/2022 10:34 AM EDT -----  Overall degenerative changes of the cervical spine, most pronounced at C5-C6 where there are several changes including some stenosis and minimal disc bulging. Refer to orthopedics, [facility] or [facility], whichever she prefers.  Dx cervical spinal stenosis, cervicogenic headaches, paresthesia of both hands

## 2022-05-11 NOTE — TELEPHONE ENCOUNTER
Spoke to the patient regarding results. Verbalized understanding. Will check with her insurance regarding an orthopedic provider and contact our office back with info.

## 2022-05-18 ENCOUNTER — HOSPITAL ENCOUNTER (OUTPATIENT)
Dept: NON INVASIVE DIAGNOSTICS | Age: 62
Discharge: HOME | End: 2022-05-18
Payer: MEDICAID

## 2022-05-18 VITALS — HEIGHT: 66 IN | WEIGHT: 117 LBS | BODY MASS INDEX: 18.8 KG/M2

## 2022-05-18 DIAGNOSIS — R94.31 ABNORMAL ECG: ICD-10-CM

## 2022-05-18 DIAGNOSIS — R00.2 PALPITATION: ICD-10-CM

## 2022-05-18 DIAGNOSIS — R00.2 INTERMITTENT PALPITATIONS: ICD-10-CM

## 2022-05-18 DIAGNOSIS — R20.2 PARESTHESIA OF BOTH HANDS: ICD-10-CM

## 2022-05-18 LAB
LV EF: 55 %
LV EF: 83 %
LVEF MODALITY: NORMAL
LVEF MODALITY: NORMAL

## 2022-05-18 PROCEDURE — 93306 TTE W/DOPPLER COMPLETE: CPT

## 2022-05-18 PROCEDURE — 93017 CV STRESS TEST TRACING ONLY: CPT | Performed by: NUCLEAR MEDICINE

## 2022-05-18 PROCEDURE — 78452 HT MUSCLE IMAGE SPECT MULT: CPT

## 2022-05-18 PROCEDURE — 93270 REMOTE 30 DAY ECG REV/REPORT: CPT

## 2022-05-18 PROCEDURE — 3430000000 HC RX DIAGNOSTIC RADIOPHARMACEUTICAL: Performed by: NUCLEAR MEDICINE

## 2022-05-18 PROCEDURE — A9500 TC99M SESTAMIBI: HCPCS | Performed by: NUCLEAR MEDICINE

## 2022-05-18 RX ADMIN — Medication 31 MILLICURIE: at 09:15

## 2022-05-18 RX ADMIN — Medication 9.4 MILLICURIE: at 08:13

## 2022-05-18 NOTE — PROCEDURES
30 Day Cardiac Event Monitor was applied to patient. Instructions were given and skin/monitor prep and application was demonstrated. Patient was instructed to remove monitor on 06/17/2022 and mail back to [employer].

## 2022-05-29 ENCOUNTER — HOSPITAL ENCOUNTER (EMERGENCY)
Age: 62
Discharge: HOME | End: 2022-05-29
Payer: MEDICAID

## 2022-05-29 VITALS
SYSTOLIC BLOOD PRESSURE: 177 MMHG | HEART RATE: 76 BPM | BODY MASS INDEX: 18.88 KG/M2 | TEMPERATURE: 98.3 F | DIASTOLIC BLOOD PRESSURE: 76 MMHG | WEIGHT: 117 LBS | OXYGEN SATURATION: 97 % | RESPIRATION RATE: 18 BRPM

## 2022-05-29 DIAGNOSIS — M62.838 NECK MUSCLE SPASM: Primary | ICD-10-CM

## 2022-05-29 DIAGNOSIS — L24.5 IRRITANT CONTACT DERMATITIS DUE TO OTHER CHEMICAL PRODUCTS: ICD-10-CM

## 2022-05-29 PROCEDURE — 99213 OFFICE O/P EST LOW 20 MIN: CPT

## 2022-05-29 PROCEDURE — 99213 OFFICE O/P EST LOW 20 MIN: CPT | Performed by: NURSE PRACTITIONER

## 2022-05-29 RX ORDER — BETAMETHASONE DIPROPIONATE 0.05 %
OINTMENT (GRAM) TOPICAL
Qty: 15 G | Refills: 0 | Status: SHIPPED | OUTPATIENT
Start: 2022-05-29 | End: 2022-09-07

## 2022-05-29 RX ORDER — CYCLOBENZAPRINE HCL 10 MG
10 TABLET ORAL 3 TIMES DAILY PRN
Qty: 21 TABLET | Refills: 0 | Status: SHIPPED | OUTPATIENT
Start: 2022-05-29 | End: 2022-06-08

## 2022-05-29 ASSESSMENT — ENCOUNTER SYMPTOMS: COLOR CHANGE: 1

## 2022-05-29 ASSESSMENT — PAIN - FUNCTIONAL ASSESSMENT
PAIN_FUNCTIONAL_ASSESSMENT: PREVENTS OR INTERFERES SOME ACTIVE ACTIVITIES AND ADLS
PAIN_FUNCTIONAL_ASSESSMENT: 0-10

## 2022-05-29 ASSESSMENT — PAIN DESCRIPTION - DESCRIPTORS: DESCRIPTORS: SHARP;SHOOTING

## 2022-05-29 ASSESSMENT — PAIN DESCRIPTION - LOCATION: LOCATION: NECK;JAW

## 2022-05-29 ASSESSMENT — PAIN DESCRIPTION - FREQUENCY: FREQUENCY: CONTINUOUS

## 2022-05-29 ASSESSMENT — PAIN SCALES - GENERAL: PAINLEVEL_OUTOF10: 6

## 2022-05-29 ASSESSMENT — PAIN DESCRIPTION - ORIENTATION: ORIENTATION: RIGHT

## 2022-05-29 NOTE — ED PROVIDER NOTES
[phone]       Chief Complaint   Patient presents with    Neck Pain     right side    Jaw Pain       Nurses Notes reviewed and I agree except as noted in the HPI. HISTORY OF PRESENT ILLNESS   Benito Dotson is a 63 y.o. female who presents for evaluation right sided neck pain/spasms. Has had this in the past. No injury or trauma. Started 5/26/2022. Applied a topical muscle rub the area before bed last night and woke up with a rash to the right side of her neck. The patient/patient representative has no other acute complaints at this time. REVIEW OF SYSTEMS     Review of Systems   Constitutional: Negative for chills and fever. Musculoskeletal: Positive for myalgias and neck pain. Skin: Positive for color change and rash. PAST MEDICAL HISTORY         Diagnosis Date    Hyperlipidemia 12/2013    Elsi Deacon    Migraine 1998    TMJ (dislocation of temporomandibular joint) 10/12/2016    ED visit       SURGICAL HISTORY     Patient  has a past surgical history that includes Appendectomy (1987); Tonsillectomy; Colonoscopy; pr colon ca scrn not hi rsk ind (N/A, 7/3/2018); and [facility] STEREO BREAST BX W LOC DEVICE 1ST LESION LEFT (Left, 4/6/2021). CURRENT MEDICATIONS       Discharge Medication List as of 5/29/2022 11:03 AM      CONTINUE these medications which have NOT CHANGED    Details   lovastatin (MEVACOR) 20 MG tablet TAKE ONE TABLET BY MOUTH NIGHTLY, Disp-90 tablet, [id number]      GARLIC PO Take 1 tablet by mouth daily. Coenzyme Q10 (CO Q 10) 100 MG CAPS Take 1 capsule by mouth daily. Multiple Vitamins-Minerals (THERAPEUTIC MULTIVITAMIN-MINERALS) tablet Take 1 tablet by mouth daily. aspirin 81 MG EC tablet Take 1 tablet by mouth daily. , Disp-30 tablet, R-3             ALLERGIES     Patient is is allergic to amoxicillin.     FAMILY HISTORY     Patient'sfamily history includes Heart Attack (age of onset: 59) in her mother; Heart Disease in her brother and brother; Other in her brother. SOCIAL HISTORY     Patient  reports that she quit smoking about 36 years ago. Her smoking use included cigarettes. She has a 1.25 pack-year smoking history. She has never used smokeless tobacco. She reports that she does not drink alcohol and does not use drugs. PHYSICAL EXAM     ED TRIAGE VITALS  BP: (!) 177/76, Temp: 98.3 °F (36.8 °C), Heart Rate: 76, Resp: 18, SpO2: 97 %  Physical Exam  Vitals and nursing note reviewed. Constitutional:       General: She is not in acute distress. Appearance: Normal appearance. She is well-developed and well-groomed. HENT:      Head: Normocephalic and atraumatic. Right Ear: External ear normal.      Left Ear: External ear normal.   Eyes:      Conjunctiva/sclera: Conjunctivae normal.      Right eye: Right conjunctiva is not injected. Left eye: Left conjunctiva is not injected. Pupils: Pupils are equal.   Cardiovascular:      Rate and Rhythm: Normal rate. Pulmonary:      Effort: Pulmonary effort is normal. No respiratory distress. Musculoskeletal:      Cervical back: Normal range of motion. Muscular tenderness (right) present. No spinous process tenderness. Skin:     General: Skin is warm and dry. Findings: Rash (Contact irritant dermatitis right neck) present. Neurological:      Mental Status: She is alert and oriented to person, place, and time. Gait: Gait is intact. Psychiatric:         Mood and Affect: Mood normal.         Speech: Speech normal.         Behavior: Behavior is cooperative. DIAGNOSTIC RESULTS   Labs:  Abnormal Labs Reviewed - No data to display     IMAGING:  No orders to display     URGENT CARE COURSE:     Vitals:    05/29/22 1049   BP: (!) 177/76   Pulse: 76   Resp: 18   Temp: 98.3 °F (36.8 °C)   TempSrc: Temporal   SpO2: 97%   Weight: 117 lb (53.1 kg)       Medications - No data to display  PROCEDURES:  FINALIMPRESSION      1.  Neck muscle spasm    2. Irritant contact dermatitis due to other chemical products        DISPOSITION/PLAN   DISPOSITION Decision To Discharge 05/29/2022 11:01:03 AM        Problem List Items Addressed This Visit     None      Visit Diagnoses     Neck muscle spasm    -  Primary    Relevant Medications    cyclobenzaprine (FLEXERIL) 10 mg tablet    Irritant contact dermatitis due to other chemical products        Relevant Medications    betamethasone dipropionate (DIPROLENE) 0.05 % ointment          Physical assessment findings, diagnostic testing(s) if applicable, and vital signs reviewed with patient/patient representative. Differential diagnosis(s) discussed with patient/patient representative. Prescription medications and/or over-the-counter medications for symptom management discussed. Patient is to follow-up with family care provider in 2-3 days if no improvement. If symptoms should worsen or change, go to the ED. Patient/patient representative is aware of care plan, questions answered, verbalizes understanding and is in agreement. Printed instructions attached to after visit summary. If COVID-19 positive or COVID-19 by PCR is pending at time of discharge patient is to quarantine/isolate according to [facility] guidelines. PATIENT REFERRED TO:  Heide Richardson MD  [facility]  [phone]    Schedule an appointment as soon as possible for a visit in 3 days  For further evaluation. , If symptoms change/worsen, go to the [address] [facility], APRN - CNP    Please note that some or all of this chart was generated using [employer] voice recognition software. Although every effort was made to ensure the accuracy of this automated transcription, some errors in transcription may have occurred.         DHIRAJ León CNP  05/29/22 9931

## 2022-05-29 NOTE — ED NOTES
advised to call back for any additional questions or concerns     Pt discharged in stable condition, ambulated to vehicle home by herself.          Belkys Pena LPN  57/83/05 [id number]

## 2022-05-29 NOTE — ED TRIAGE NOTES
Patient to room with c/o right sided neck pain, jaw pain, and ear pain beginning three days ago. Denies injury. C/o redness to right side of neck beginning this morning.

## 2022-06-08 ENCOUNTER — TELEPHONE (OUTPATIENT)
Dept: FAMILY MEDICINE CLINIC | Age: 62
End: 2022-06-08

## 2022-06-08 NOTE — LETTER
[address]  [address], [address]  Phone: [phone]  Fax: [phone]    June 8, 2022    [address] [place] [address] Drive  [address] [place] [address] Road [address]      Dear Watson Zaragoza,    This letter is regarding your Emergency Department (ED) visit at [facility] on 5/29/22. Maty Pitt wanted to make sure that you understand your discharge instructions and that you were able to fill any prescriptions that may have been ordered for you. Please contact the office at the above phone number if the ED advised to you follow up with Maty Pitt, or if you have any further questions or needs. Also did you know -   *Visiting the ED for a non-emergency could result in higher co-pays than you would normally be subject to paying? *You can call your doctor even after hours so they can direct you to the most appropriate care. [facility]) practices can often offer you an appointment on the same day that you call. *We have some [facility] offices that offer Walk-in appointments; check our website for availability in your community, www. [email or web address].      *Evisits are now available for patients for $36 through [employer] for certain conditions:  * Sinus, cold and or cough       * Diarrhea            * Headache  * Heartburn                                * Poison Claire          * Back pain     * Urinary problems                         If you do not have [employer] and are interested, please contact the office and a staff member may assist you or go to [email or web address].     Sincerely,   Vishal Vaca MD and your [facility]

## 2022-06-22 NOTE — PROCEDURES
[address] [id number]                                 EVENT MONITOR    PATIENT NAME: Ann Richardson                    :        1960  MED REC NO:   [id number]                           ROOM:  ACCOUNT NO:   [de-identified]                           ADMIT DATE: 2022  PROVIDER:     Mona Hendrix M.D.    TEST TYPE:  Event monitor. CLINICAL HISTORY AND INDICATION:  This is a patient with palpitation. EVENT MONITOR DESCRIPTION:  Event monitor was attached to the patient  between 2022 and 2022. EVENT MONITOR FINDINGS:  Baseline rhythm showed sinus rhythm, otherwise  unremarkable event monitor with no significant arrhythmia. CONCLUSION:  Unremarkable event monitor with no significant arrhythmias.         Addison Chan M.D.    D: 2022 7:39:15       T: 2022 7:41:34     [email or web address]  Job#: [id number]     Doc#: [id number]    CC:

## 2022-08-23 ENCOUNTER — HOSPITAL ENCOUNTER (OUTPATIENT)
Dept: WOMENS IMAGING | Age: 62
Discharge: HOME | End: 2022-08-23
Payer: MEDICAID

## 2022-08-23 DIAGNOSIS — Z12.39 ENCOUNTER FOR OTHER SCREENING FOR MALIGNANT NEOPLASM OF BREAST: ICD-10-CM

## 2022-08-23 PROCEDURE — 77063 BREAST TOMOSYNTHESIS BI: CPT

## 2022-08-31 DIAGNOSIS — E78.00 HYPERCHOLESTEROLEMIA: ICD-10-CM

## 2022-09-01 RX ORDER — LOVASTATIN 20 MG/1
TABLET ORAL
Qty: 90 TABLET | Refills: 1 | Status: SHIPPED | OUTPATIENT
Start: 2022-09-01

## 2022-09-06 DIAGNOSIS — E78.00 HYPERCHOLESTEROLEMIA: ICD-10-CM

## 2022-09-07 ENCOUNTER — OFFICE VISIT (OUTPATIENT)
Dept: CARDIOLOGY CLINIC | Age: 62
End: 2022-09-07
Payer: MEDICAID

## 2022-09-07 VITALS
DIASTOLIC BLOOD PRESSURE: 69 MMHG | BODY MASS INDEX: 18.45 KG/M2 | HEIGHT: 66 IN | HEART RATE: 77 BPM | WEIGHT: 114.8 LBS | SYSTOLIC BLOOD PRESSURE: 127 MMHG

## 2022-09-07 DIAGNOSIS — E78.01 FAMILIAL HYPERCHOLESTEROLEMIA: Primary | ICD-10-CM

## 2022-09-07 DIAGNOSIS — R00.2 PALPITATION: ICD-10-CM

## 2022-09-07 PROCEDURE — G8420 CALC BMI NORM PARAMETERS: HCPCS | Performed by: NUCLEAR MEDICINE

## 2022-09-07 PROCEDURE — 99213 OFFICE O/P EST LOW 20 MIN: CPT | Performed by: NUCLEAR MEDICINE

## 2022-09-07 PROCEDURE — 1036F TOBACCO NON-USER: CPT | Performed by: NUCLEAR MEDICINE

## 2022-09-07 PROCEDURE — 3017F COLORECTAL CA SCREEN DOC REV: CPT | Performed by: NUCLEAR MEDICINE

## 2022-09-07 PROCEDURE — G8427 DOCREV CUR MEDS BY ELIG CLIN: HCPCS | Performed by: NUCLEAR MEDICINE

## 2022-09-07 RX ORDER — LOVASTATIN 20 MG/1
TABLET ORAL
Qty: 90 TABLET | Refills: 1 | OUTPATIENT
Start: 2022-09-07

## 2022-09-07 NOTE — PROGRESS NOTES
Miguelina [address] [place] [address] [address]  [address]  [facility] [address]  Dept: [phone]  Dept Fax: [phone]  Loc: [phone]    Visit Date: 2022    Joselyn Halsted is a 63 y.o. female who presents todayfor:  Chief Complaint   Patient presents with    3 Month Follow-Up    Check-Up    Hyperlipidemia    Palpitations   Seen for palpitation   Work up was all good  No arrhythmia was caught   Stress test and echo were okay   No other symptoms  BP is stable   On statins for hyperlipidemia        HPI:  HPI  Past Medical History:   Diagnosis Date    Hyperlipidemia 2013    Leydi Mckinney    Migraine     TMJ (dislocation of temporomandibular joint) 10/12/2016    ED visit      Past Surgical History:   Procedure Laterality Date    APPENDECTOMY      COLONOSCOPY      [facility] BREAST BIOPSY LEFT Left 2021    [facility] LOC BREAST BIOPSY LEFT 2021 Glenna Allan MD [facility]    [place] COLON [place] SCRN NOT  [address] [place] [address] N/A 7/3/2018    COLONOSCOPY performed by Liv French MD at [address] [place] [address]      at 13 yrs old     Family History   Problem Relation Age of Onset    Heart Attack Mother 59        due to medication    Other Brother         collitis    Heart Disease Brother     Heart Disease Brother      Social History     Tobacco Use    Smoking status: Former     Packs/day: 0.25     Years: 5.00     Pack years: 1.25     Types: Cigarettes     Quit date: 10/12/1985     Years since quittin.9    Smokeless tobacco: Never   Substance Use Topics    Alcohol use: No      Current Outpatient Medications   Medication Sig Dispense Refill    lovastatin (MEVACOR) 20 MG tablet TAKE ONE TABLET BY MOUTH NIGHTLY 90 tablet 1    GARLIC PO Take 1 tablet by mouth daily. Coenzyme Q10 (CO Q 10) 100 MG CAPS Take 1 capsule by mouth daily.       Multiple Vitamins-Minerals (THERAPEUTIC MULTIVITAMIN-MINERALS) tablet Take 1 tablet by mouth risk factor modification and medical management  Thank you for allowing me to participate in the care of your patient. Please don't hesitate to contact me regarding any further issues related to the patient care    Orders Placed:  No orders of the defined types were placed in this encounter. Medications Prescribed:  No orders of the defined types were placed in this encounter. Discussed use, benefit, and side effects of prescribed medications. All patient questions answered. Pt voicedunderstanding. Instructed to continue current medications, diet and exercise. Continue risk factor modification and medical management. Patient agreed with treatment plan. Follow up as directed.     Electronically signedby Mario Bocanegra MD on 9/7/2022 at 7:53 AM

## 2022-09-13 ENCOUNTER — OFFICE VISIT (OUTPATIENT)
Dept: FAMILY MEDICINE CLINIC | Age: 62
End: 2022-09-13
Payer: MEDICAID

## 2022-09-13 ENCOUNTER — CARE COORDINATION (OUTPATIENT)
Dept: CARE COORDINATION | Age: 62
End: 2022-09-13

## 2022-09-13 VITALS
SYSTOLIC BLOOD PRESSURE: 124 MMHG | BODY MASS INDEX: 18.61 KG/M2 | HEIGHT: 66 IN | RESPIRATION RATE: 12 BRPM | TEMPERATURE: 98 F | WEIGHT: 115.8 LBS | HEART RATE: 66 BPM | DIASTOLIC BLOOD PRESSURE: 74 MMHG

## 2022-09-13 DIAGNOSIS — M50.922 UNSPECIFIED CERVICAL DISC DISORDER AT C5-C6 LEVEL: Primary | ICD-10-CM

## 2022-09-13 DIAGNOSIS — Z79.899 ON STATIN THERAPY: ICD-10-CM

## 2022-09-13 DIAGNOSIS — E55.9 VITAMIN D INSUFFICIENCY: ICD-10-CM

## 2022-09-13 DIAGNOSIS — E78.00 HYPERCHOLESTEROLEMIA: ICD-10-CM

## 2022-09-13 DIAGNOSIS — Z87.898 HISTORY OF PALPITATIONS: ICD-10-CM

## 2022-09-13 PROCEDURE — G8420 CALC BMI NORM PARAMETERS: HCPCS | Performed by: NURSE PRACTITIONER

## 2022-09-13 PROCEDURE — G8427 DOCREV CUR MEDS BY ELIG CLIN: HCPCS | Performed by: NURSE PRACTITIONER

## 2022-09-13 PROCEDURE — 1036F TOBACCO NON-USER: CPT | Performed by: NURSE PRACTITIONER

## 2022-09-13 PROCEDURE — 3017F COLORECTAL CA SCREEN DOC REV: CPT | Performed by: NURSE PRACTITIONER

## 2022-09-13 PROCEDURE — 99214 OFFICE O/P EST MOD 30 MIN: CPT | Performed by: NURSE PRACTITIONER

## 2022-09-13 SDOH — ECONOMIC STABILITY: FOOD INSECURITY: WITHIN THE PAST 12 MONTHS, THE FOOD YOU BOUGHT JUST DIDN'T LAST AND YOU DIDN'T HAVE MONEY TO GET MORE.: OFTEN TRUE

## 2022-09-13 SDOH — ECONOMIC STABILITY: FOOD INSECURITY: WITHIN THE PAST 12 MONTHS, YOU WORRIED THAT YOUR FOOD WOULD RUN OUT BEFORE YOU GOT MONEY TO BUY MORE.: OFTEN TRUE

## 2022-09-13 SDOH — ECONOMIC STABILITY: FOOD INSECURITY: WITHIN THE PAST 12 MONTHS, YOU WORRIED THAT YOUR FOOD WOULD RUN OUT BEFORE YOU GOT MONEY TO BUY MORE.: NEVER TRUE

## 2022-09-13 SDOH — ECONOMIC STABILITY: FOOD INSECURITY: WITHIN THE PAST 12 MONTHS, THE FOOD YOU BOUGHT JUST DIDN'T LAST AND YOU DIDN'T HAVE MONEY TO GET MORE.: NEVER TRUE

## 2022-09-13 ASSESSMENT — SOCIAL DETERMINANTS OF HEALTH (SDOH): HOW HARD IS IT FOR YOU TO PAY FOR THE VERY BASICS LIKE FOOD, HOUSING, MEDICAL CARE, AND HEATING?: NOT HARD AT ALL

## 2022-09-13 NOTE — PATIENT INSTRUCTIONS
You may receive a survey about your visit with us today. The feedback from our patients helps us identify what is working well and where the service to all patients can be enhanced. Thank you!      Fasting lab work  Refer to pain management

## 2022-09-13 NOTE — PROGRESS NOTES
Mynor Perez (:  1960) is a 63 y.o. female,Established patient, here for evaluation of the following chief complaint(s):  Follow-up and Neck Pain (Discuss option for steroid injections )         ASSESSMENT/PLAN:  1. Unspecified cervical disc disorder at C5-C6 level  -     [address] [place] [address] with Auto Differential; Future  -     Comprehensive Metabolic Panel; Future  -     Urinalysis with Microscopic; Future  -     Lipid, Fasting; Future  2. Hypercholesterolemia  -     CBC with Auto Differential; Future  -     Comprehensive Metabolic Panel; Future  -     Urinalysis with Microscopic; Future  -     Lipid, Fasting; Future  3. On statin therapy  -     CBC with Auto Differential; Future  -     Comprehensive Metabolic Panel; Future  -     Urinalysis with Microscopic; Future  -     Lipid, Fasting; Future  4. History of palpitations  -     CBC with Auto Differential; Future  -     Comprehensive Metabolic Panel; Future  -     Urinalysis with Microscopic; Future  -     Lipid, Fasting; Future  5. Vitamin D insufficiency  -     Vitamin D 25 Hydroxy; Future    Routine blood work. Continue lovastatin 20 mg nightly. Will refer to pain management for neck pain    Return in about 6 months (around 3/13/2023) for wellness. Subjective   SUBJECTIVE/OBJECTIVE:  HPI    Cervical spinal pain: /winter 2017, she fell asleep once crocheting and woke up with her chin on her chest, and since then, she has had neck issues. She also had issues with moving her neck certain way sometimes she would feel dizzy and off balance, which has been a chronic problem. She had a massage once and it was very helpful, but she has abdominal muscle spasms laying on her stomach. Did PT in 2019 for chronic cervical and upper thoracic pain  Tries to keep moving, uses ice or heat. Had paresthesias that were very irregular, those recently have resolved.  Can be effected by the chairs she sits in or the way she moves/sits. MRI May 2022 showed: Overall degenerative changes of the cervical spine, most pronounced at C5-C6 where there are several changes including some stenosis and minimal disc bulging. We discussed referral to orthopedics, but they do not take her insurance. She is walking a lot, doing stretches for her back and neck. She has increased her protein intake with powders/shakes. Having some food insecurities and not able to afford meat. Hypercholesterolemia: Taking lovastatin as directed. Due for lipid test    Palpitations, seen by cardiology, most recently 9/7. Note reviewed. Plan to see PRN if issues arise. Vit D insufficiency: taking Vit D3  1000 IU daily. Review of Systems   Musculoskeletal:  Positive for neck pain. All other systems reviewed and are negative. Objective   Physical Exam  Vitals reviewed. Constitutional:       General: She is not in acute distress. Appearance: She is well-developed. HENT:      Head: Normocephalic. Right Ear: External ear normal.      Left Ear: External ear normal.      Nose: Nose normal.      Mouth/Throat:      Pharynx: No oropharyngeal exudate. Eyes:      Conjunctiva/sclera: Conjunctivae normal.      Pupils: Pupils are equal, round, and reactive to light. Neck:      Thyroid: No thyromegaly. Cardiovascular:      Rate and Rhythm: Normal rate and regular rhythm. Heart sounds: Normal heart sounds. No murmur heard. No friction rub. No gallop. Pulmonary:      Effort: Pulmonary effort is normal. No respiratory distress. Breath sounds: Normal breath sounds. No wheezing or rales. Abdominal:      General: There is no distension. Palpations: Abdomen is soft. There is no mass. Tenderness: There is no abdominal tenderness. There is no guarding. Musculoskeletal:         General: Normal range of motion. Cervical back: Normal range of motion and neck supple.    Lymphadenopathy:

## 2022-09-14 ENCOUNTER — NURSE ONLY (OUTPATIENT)
Dept: LAB | Age: 62
End: 2022-09-14

## 2022-09-14 DIAGNOSIS — E78.00 HYPERCHOLESTEROLEMIA: ICD-10-CM

## 2022-09-14 DIAGNOSIS — M50.922 UNSPECIFIED CERVICAL DISC DISORDER AT C5-C6 LEVEL: ICD-10-CM

## 2022-09-14 DIAGNOSIS — E55.9 VITAMIN D INSUFFICIENCY: ICD-10-CM

## 2022-09-14 DIAGNOSIS — Z79.899 ON STATIN THERAPY: ICD-10-CM

## 2022-09-14 DIAGNOSIS — Z87.898 HISTORY OF PALPITATIONS: ICD-10-CM

## 2022-09-14 LAB
ALBUMIN SERPL-MCNC: 4.7 G/DL (ref 3.5–5.1)
ALP BLD-CCNC: 99 U/L (ref 38–126)
ALT SERPL-CCNC: 14 U/L (ref 11–66)
ANION GAP SERPL CALCULATED.3IONS-SCNC: 14 MEQ/L (ref 8–16)
AST SERPL-CCNC: 18 U/L (ref 5–40)
BACTERIA: NORMAL
BASOPHILS # BLD: 0.8 %
BASOPHILS ABSOLUTE: 0 THOU/MM3 (ref 0–0.1)
BILIRUB SERPL-MCNC: 0.3 MG/DL (ref 0.3–1.2)
BILIRUBIN URINE: NEGATIVE
BLOOD, URINE: NEGATIVE
BUN BLDV-MCNC: 18 MG/DL (ref 7–22)
CALCIUM SERPL-MCNC: 9.4 MG/DL (ref 8.5–10.5)
CASTS: NORMAL /LPF
CASTS: NORMAL /LPF
CHARACTER, URINE: CLEAR
CHLORIDE BLD-SCNC: 101 MEQ/L (ref 98–111)
CHOLESTEROL, FASTING: 191 MG/DL (ref 100–199)
CO2: 26 MEQ/L (ref 23–33)
COLOR: YELLOW
CREAT SERPL-MCNC: 0.5 MG/DL (ref 0.4–1.2)
CRYSTALS: NORMAL
EOSINOPHIL # BLD: 2.9 %
EOSINOPHILS ABSOLUTE: 0.1 THOU/MM3 (ref 0–0.4)
EPITHELIAL CELLS, UA: NORMAL /HPF
ERYTHROCYTE [DISTWIDTH] IN BLOOD BY AUTOMATED COUNT: 12.6 % (ref 11.5–14.5)
ERYTHROCYTE [DISTWIDTH] IN BLOOD BY AUTOMATED COUNT: 39.9 FL (ref 35–45)
GFR SERPL CREATININE-BSD FRML MDRD: > 90 ML/MIN/1.73M2
GLUCOSE BLD-MCNC: 103 MG/DL (ref 70–108)
GLUCOSE, URINE: NEGATIVE MG/DL
HCT VFR BLD CALC: 45.2 % (ref 37–47)
HDLC SERPL-MCNC: 70 MG/DL
HEMOGLOBIN: 15 GM/DL (ref 12–16)
IMMATURE GRANS (ABS): 0.01 THOU/MM3 (ref 0–0.07)
IMMATURE GRANULOCYTES: 0.2 %
KETONES, URINE: NEGATIVE
LDL CHOLESTEROL CALCULATED: 107 MG/DL
LEUKOCYTE ESTERASE, URINE: NEGATIVE
LYMPHOCYTES # BLD: 28.6 %
LYMPHOCYTES ABSOLUTE: 1.4 THOU/MM3 (ref 1–4.8)
MCH RBC QN AUTO: 29 PG (ref 26–33)
MCHC RBC AUTO-ENTMCNC: 33.2 GM/DL (ref 32.2–35.5)
MCV RBC AUTO: 87.4 FL (ref 81–99)
MISCELLANEOUS LAB TEST RESULT: NORMAL
MONOCYTES # BLD: 7.7 %
MONOCYTES ABSOLUTE: 0.4 THOU/MM3 (ref 0.4–1.3)
NITRITE, URINE: NEGATIVE
NUCLEATED RED BLOOD CELLS: 0 /100 WBC
PH UA: 7 (ref 5–9)
PLATELET # BLD: 306 THOU/MM3 (ref 130–400)
PMV BLD AUTO: 10.2 FL (ref 9.4–12.4)
POTASSIUM SERPL-SCNC: 4.1 MEQ/L (ref 3.5–5.2)
PROTEIN UA: NEGATIVE MG/DL
RBC # BLD: 5.17 MILL/MM3 (ref 4.2–5.4)
RBC URINE: NORMAL /HPF
RENAL EPITHELIAL, UA: NORMAL
SEG NEUTROPHILS: 59.8 %
SEGMENTED NEUTROPHILS ABSOLUTE COUNT: 2.9 THOU/MM3 (ref 1.8–7.7)
SODIUM BLD-SCNC: 141 MEQ/L (ref 135–145)
SPECIFIC GRAVITY UA: 1.01 (ref 1–1.03)
TOTAL PROTEIN: 7.2 G/DL (ref 6.1–8)
TRIGLYCERIDE, FASTING: 71 MG/DL (ref 0–199)
UROBILINOGEN, URINE: 0.2 EU/DL (ref 0–1)
VITAMIN D 25-HYDROXY: 39 NG/ML (ref 30–100)
WBC # BLD: 4.8 THOU/MM3 (ref 4.8–10.8)
WBC UA: NORMAL /HPF
YEAST: NORMAL

## 2022-09-15 DIAGNOSIS — E78.00 HYPERCHOLESTEROLEMIA: ICD-10-CM

## 2022-09-16 RX ORDER — LOVASTATIN 20 MG/1
TABLET ORAL
Qty: 90 TABLET | Refills: 1 | OUTPATIENT
Start: 2022-09-16

## 2022-09-22 ENCOUNTER — CARE COORDINATION (OUTPATIENT)
Dept: CARE COORDINATION | Age: 62
End: 2022-09-22

## 2022-09-22 NOTE — CARE COORDINATION
AMANDA called pt to follow up with food pantry mailing I sent her. Pt unavailable at the time of my call. Vm was left asking pt to return my call to [phone].

## 2022-09-23 ENCOUNTER — CARE COORDINATION (OUTPATIENT)
Dept: CARE COORDINATION | Age: 62
End: 2022-09-23

## 2022-09-23 NOTE — CARE COORDINATION
Pt returned my call from 9/22 to inform me that she did receive the mailing from 9/13 providing resources. Also included contact information. Will resolve if I don't hear from her by 10/7.

## 2022-10-05 ENCOUNTER — OFFICE VISIT (OUTPATIENT)
Dept: PHYSICAL MEDICINE AND REHAB | Age: 62
End: 2022-10-05
Payer: MEDICAID

## 2022-10-05 VITALS
DIASTOLIC BLOOD PRESSURE: 72 MMHG | SYSTOLIC BLOOD PRESSURE: 128 MMHG | HEIGHT: 66 IN | BODY MASS INDEX: 18.48 KG/M2 | WEIGHT: 115 LBS

## 2022-10-05 DIAGNOSIS — M48.02 FORAMINAL STENOSIS OF CERVICAL REGION: ICD-10-CM

## 2022-10-05 DIAGNOSIS — G89.4 CHRONIC PAIN SYNDROME: ICD-10-CM

## 2022-10-05 DIAGNOSIS — M50.30 BULGE OF CERVICAL DISC WITHOUT MYELOPATHY: ICD-10-CM

## 2022-10-05 DIAGNOSIS — M79.18 MYOFASCIAL PAIN: Primary | ICD-10-CM

## 2022-10-05 DIAGNOSIS — M47.812 FACET HYPERTROPHY OF CERVICAL REGION: ICD-10-CM

## 2022-10-05 DIAGNOSIS — M47.812 CERVICAL SPONDYLOSIS: ICD-10-CM

## 2022-10-05 PROCEDURE — 3017F COLORECTAL CA SCREEN DOC REV: CPT | Performed by: NURSE PRACTITIONER

## 2022-10-05 PROCEDURE — G8427 DOCREV CUR MEDS BY ELIG CLIN: HCPCS | Performed by: NURSE PRACTITIONER

## 2022-10-05 PROCEDURE — 99205 OFFICE O/P NEW HI 60 MIN: CPT | Performed by: NURSE PRACTITIONER

## 2022-10-05 PROCEDURE — 1036F TOBACCO NON-USER: CPT | Performed by: NURSE PRACTITIONER

## 2022-10-05 PROCEDURE — G8420 CALC BMI NORM PARAMETERS: HCPCS | Performed by: NURSE PRACTITIONER

## 2022-10-05 PROCEDURE — 20553 NJX 1/MLT TRIGGER POINTS 3/>: CPT | Performed by: NURSE PRACTITIONER

## 2022-10-05 PROCEDURE — G8484 FLU IMMUNIZE NO ADMIN: HCPCS | Performed by: NURSE PRACTITIONER

## 2022-10-05 RX ORDER — METHOCARBAMOL 100 MG/ML
100 INJECTION, SOLUTION INTRAMUSCULAR; INTRAVENOUS ONCE
Status: COMPLETED | OUTPATIENT
Start: 2022-10-05 | End: 2022-10-05

## 2022-10-05 RX ADMIN — METHOCARBAMOL 100 MG: 100 INJECTION, SOLUTION INTRAMUSCULAR; INTRAVENOUS at 11:21

## 2022-10-05 NOTE — PROGRESS NOTES
Chronic Pain/PM&R Clinic Note     Encounter Date: 10/5/22    Subjective:   Chief Complaint:   Chief Complaint   Patient presents with    New Patient     Pain in neck       History of Present Illness:   Krzysztof Hdez is a 63 y.o. female seen in the clinic initially on 10/05/2022 upon request from [facility], DHIRAJ for her history of neck pain. PmHX of HLD. Patient complains of bilateral neck pain, that does not radiate into her arms. She states at times will go into the tops of her shoulders, and from her neck to the top of her head into the back of the eyes. She states pain has been occurring for 5 years. She reports that at that time she was crocheting, fell asleep with her chin on her chest, slightly bent for few hours and when she woke up had pain. She denies any accident, injury, fall at that time. She states the pain is debilitating at times, makes her feel off balance and nauseated when it worsens. She does state it is a constant ache. She reports that she has noticed certain positions or movements will make her have a headache and feel nauseated, such as standing on her knees, twisting, crossing her legs at times. She states she will also occasionally get blurred vision when this happens. She states she has massage in the past with great relief but only lasted 2 days. She also reports that if she pushes on a certain spot her pain will be alleviated somewhat. She notices pain is better in the summer worse when it is cold out. She reports she used to get some tingling weakness in her arms but has normalized 5 months. She reports she does feel very tight/stiff. She states she completed physical therapy 2 years ago with no change. She still continues to do her home exercises without relief.     History of Interventions:   Surgery: No previous cervical surgeries  Injections: None    Current Treatment Medications:   Ibuprofen- mild relief    Historical Treatment Medications:   Aleve- no relief  Muscle relaxer's - no relief,makes her sleepy     Imagin2022  PROCEDURE: MRI CERVICAL SPINE WO CONTRAST       CLINICAL INFORMATION: Cervicogenic headache, Paresthesia of both hands . No known injury. COMPARISON: Cervical spine radiographs dated 2020. TECHNIQUE: Sagittal T1, T2 and STIR sequences were obtained through the cervical spine. Axial fast and echo and gradient echo T2-weighted images were obtained. FINDINGS:   There is anatomic vertebral body height and alignment. Marrow signal is within normal limits. The craniocervical junction appears intact. The cervical spinal cord is normal in caliber without focal area of abnormal T2 signal. Paraspinal soft tissues are    unremarkable. At C2-3 there is no significant spinal canal or neuroforaminal stenosis. C3-4 there is no significant spinal canal or neuroforaminal stenosis. At C4-5 there is no significant spinal canal or neuroforaminal stenosis. At C5-6 there is a minimal disc bulge and posterior ligament flavum thickening without significant spinal canal stenosis. There is moderate left neural foraminal stenosis in association with facet hypertrophy. At C6-7 there is a shallow disc bulge with mild ligament flavum thickening without significant spinal canal or neuroforaminal stenosis. At C7-T1 there is no significant spinal canal or neuroforaminal stenosis. Impression    Overall mild degenerative changes of the cervical spine more focally pronounced at C5-6 where there is moderate left neural foraminal stenosis in association with left facet hypertrophy and ligamentum flavum thickening and minimal disc bulge.        Past Medical History:   Diagnosis Date    Hyperlipidemia 2013    [place] [facility]    Migraine 1998    TMJ (dislocation of temporomandibular joint) 10/12/2016    ED visit       Past Surgical History:   Procedure Laterality Date    APPENDECTOMY      COLONOSCOPY      [facility] LOC BREAST BIOPSY LEFT Left 4/6/2021    [facility] LOC BREAST BIOPSY LEFT 4/6/2021 Alan Gray MD [facility]    [place] COLON CA SCRN NOT  [address] [facility] [facility] N/A 7/3/2018    COLONOSCOPY performed by Glenna Hall MD at [address] [place] [address]      at 13 yrs old       Family History   Problem Relation Age of Onset    Heart Attack Mother 59        due to medication    Other Brother         collitis    Heart Disease Brother     Heart Disease Brother          Medications & Allergies:   Current Outpatient Medications   Medication Instructions    aspirin 81 mg, Oral, DAILY    Coenzyme Q10 (CO Q 10) 100 MG CAPS 1 capsule, Oral, DAILY    GARLIC PO 1 tablet, Oral, DAILY    lovastatin (MEVACOR) 20 MG tablet TAKE ONE TABLET BY MOUTH NIGHTLY    Multiple Vitamins-Minerals (THERAPEUTIC MULTIVITAMIN-MINERALS) tablet 1 tablet, Oral, DAILY       Allergies   Allergen Reactions    Amoxicillin        Review of Systems:   Constitutional: negative for weight changes or fevers  Genitourinary: negative for bowel/bladder incontinence   Musculoskeletal: positive for neck pain  Neurological: negative for any arm weakness or numbness/tingling  Behavioral/Psych: negative for anxiety/depression   All other systems reviewed and are negative    Objective:     Vitals:    10/05/22 0938   BP: 128/72     Constitutional: Pleasant, no acute distress   Head: Normocephalic, atraumatic   Eyes: Conjunctivae normal   Neck: Supple, symmetrical   Respiration: Non-labored breathing   Cardiovascular: Limbs warm and well perfused   Musculoskeletal: Full cervical ROM in all planes. positive Spurling maneuver bilaterally. Mild increased pain with facet loading. tenderness to palpation in cervical paraspinals or other posterior neck musculature. Neuro: Alert, oriented. CN II-XII appear grossly intact. Motor strength 5/5 SAb, EF, EE, WE, [place]app[place]. LT sensation intact in upper limbs.   Skin: no skin rashes or lesions noted   Psychological: Cooperative, no exaggerated pain behaviors        Assessment:    Diagnosis Orders   1. Myofascial pain        2. Chronic pain syndrome        3. Cervical spondylosis        4. Facet hypertrophy of cervical region        5. Bulge of cervical disc without myelopathy        6. Foraminal stenosis of cervical region             Baron Copeland is a 63 y. o.female presenting to the pain clinic for evaluation of neck pain. She is noted to have significant myofascial pain on examination. Did discuss with her procedures to assist with pain control such as; bilateral transforaminal cervical epidural steroid injection at C5-6, facet injections at C5-6, occipital nerve block, trigger point injections. Also discussed formal physical therapy with dry needling or cupping. At this time she would like to start with trigger point injections in the office with me today. She is educated to continue over-the-counter's, topicals, ice, heat, stretching, home exercises and I will see her back in 6 weeks to reevaluate. Plan: The following treatment recommendations and plan were discussed in detail with Baron Copeland. Imaging:   I have reviewed patients imaging of cervical MRI and results were discussed with patient today. Analgesics:   Patient is taking Acetaminophen. Patient informed that the maximum amount of acetaminophen taken on a regular basis should only be 4000 mg per day. Patient is taking ibuprofen. Patient is advised to take as prescribed and not take on an empty stomach. Adjuvants:   None    Interventions: With examination consistent with cervical myofascial pain, we will proceed cervical and surrounding skin which are trigger point injections, the risks and benefits were discussed in detail with the patient.   Patient wants to proceed with the injection with me today    Anticoagulation/NPO Recommendations:   None    Multidisciplinary Pain Management:   In the presence of complex, chronic, and multi-factorial pain, the importance of a multidisciplinary approach to pain management in the patients management regimen was emphasized and discussed in great detail. The patient was also advised to continue physical therapy and stretching exercises at home and cognitive behavioral and/or group therapy. Referrals:  None    Prescriptions Written This Visit:   None    Follow-up:   6 weeks    It was my pleasure to evaluate Evan Mayo today. I spent over 60 minutes evaluating this patient, reviewing previous notes and images and completing documentation. Jose Daniel Russell, APRN - CNP   Interventional Pain Management/PM&R   [place]     Procedure  Visit Date: 10/5/22    Evan Mayo is a 63 y.o. female presents today in the office for the following:  Chief Complaint   Patient presents with    New Patient     Pain in neck       History of Present Illness   Bo is here today for trigger point injections. Pre-Op Diagnosis   Myofacial pain syndrome     Post-Op Diagnosis   Myofacial pain syndrome     Procedure: Trigger point injection(s)    Procedure Documentation   Patient identified. Consent signed. Site identified. Trigger points were identified in the bilateral cervical paraspinals, bilateral trapezius, bilateral rhomboids, bilateral thoracic paraspinals for a total of 20 trigger point injections. Then with a 25g needle entered each trigger point and after negative aspiration, 1 cc of a mixture containing 1:10 - 100 mg methocarbamol: 0.5% bupivacaine was injected at each trigger point for a total of 20 trigger points. Procedural Complications: None      Vitals:    10/05/22 0938   BP: 128/72   Site: Right Upper Arm   Position: Sitting   Cuff Size: Large Adult   Weight: 115 lb (52.2 kg)   Height: 5' 5.98\" (1.676 m)       Conclusion   No complications encountered. Patient discharged stable condition. Patient told if any problems to call office or go to ER.     No orders of the defined types were placed in this encounter.       Electronically signed by DHIRAJ Smith CNP on 10/5/22 at 10:35 AM EDT

## 2022-10-07 ENCOUNTER — CARE COORDINATION (OUTPATIENT)
Dept: CARE COORDINATION | Age: 62
End: 2022-10-07

## 2022-11-16 ENCOUNTER — OFFICE VISIT (OUTPATIENT)
Dept: PHYSICAL MEDICINE AND REHAB | Age: 62
End: 2022-11-16
Payer: MEDICAID

## 2022-11-16 VITALS
BODY MASS INDEX: 18.48 KG/M2 | WEIGHT: 115 LBS | HEIGHT: 66 IN | DIASTOLIC BLOOD PRESSURE: 78 MMHG | SYSTOLIC BLOOD PRESSURE: 122 MMHG

## 2022-11-16 DIAGNOSIS — M47.812 FACET HYPERTROPHY OF CERVICAL REGION: ICD-10-CM

## 2022-11-16 DIAGNOSIS — M47.812 CERVICAL SPONDYLOSIS: ICD-10-CM

## 2022-11-16 DIAGNOSIS — M79.18 MYOFASCIAL PAIN: ICD-10-CM

## 2022-11-16 DIAGNOSIS — M50.30 BULGE OF CERVICAL DISC WITHOUT MYELOPATHY: Primary | ICD-10-CM

## 2022-11-16 DIAGNOSIS — G89.4 CHRONIC PAIN SYNDROME: ICD-10-CM

## 2022-11-16 DIAGNOSIS — M48.02 FORAMINAL STENOSIS OF CERVICAL REGION: ICD-10-CM

## 2022-11-16 PROCEDURE — G8484 FLU IMMUNIZE NO ADMIN: HCPCS | Performed by: NURSE PRACTITIONER

## 2022-11-16 PROCEDURE — G8427 DOCREV CUR MEDS BY ELIG CLIN: HCPCS | Performed by: NURSE PRACTITIONER

## 2022-11-16 PROCEDURE — 1036F TOBACCO NON-USER: CPT | Performed by: NURSE PRACTITIONER

## 2022-11-16 PROCEDURE — 3017F COLORECTAL CA SCREEN DOC REV: CPT | Performed by: NURSE PRACTITIONER

## 2022-11-16 PROCEDURE — 99214 OFFICE O/P EST MOD 30 MIN: CPT | Performed by: NURSE PRACTITIONER

## 2022-11-16 PROCEDURE — 20553 NJX 1/MLT TRIGGER POINTS 3/>: CPT | Performed by: NURSE PRACTITIONER

## 2022-11-16 PROCEDURE — G8420 CALC BMI NORM PARAMETERS: HCPCS | Performed by: NURSE PRACTITIONER

## 2022-11-16 RX ORDER — TRIAMCINOLONE ACETONIDE 40 MG/ML
40 INJECTION, SUSPENSION INTRA-ARTICULAR; INTRAMUSCULAR ONCE
Status: COMPLETED | OUTPATIENT
Start: 2022-11-16 | End: 2022-11-16

## 2022-11-16 RX ADMIN — TRIAMCINOLONE ACETONIDE 40 MG: 40 INJECTION, SUSPENSION INTRA-ARTICULAR; INTRAMUSCULAR at 13:25

## 2022-11-16 NOTE — PROGRESS NOTES
Chronic Pain/PM&R Clinic Note     Encounter Date: 11/16/22    Subjective:   Chief Complaint:   Chief Complaint   Patient presents with    Follow-up       History of Present Illness:   Deshawn Cao is a 63 y.o. female seen in the clinic initially on 10/05/2022 upon request from DHIRAJ Singh for her history of neck pain. PmHX of HLD. Patient complains of bilateral neck pain, that does not radiate into her arms. She states at times will go into the tops of her shoulders, and from her neck to the top of her head into the back of the eyes. She states pain has been occurring for 5 years. She reports that at that time she was crocheting, fell asleep with her chin on her chest, slightly bent for few hours and when she woke up had pain. She denies any accident, injury, fall at that time. She states the pain is debilitating at times, makes her feel off balance and nauseated when it worsens. She does state it is a constant ache. She reports that she has noticed certain positions or movements will make her have a headache and feel nauseated, such as standing on her knees, twisting, crossing her legs at times. She states she will also occasionally get blurred vision when this happens. She states she has massage in the past with great relief but only lasted 2 days. She also reports that if she pushes on a certain spot her pain will be alleviated somewhat. She notices pain is better in the summer worse when it is cold out. She reports she used to get some tingling weakness in her arms but has normalized 5 months. She reports she does feel very tight/stiff. She states she completed physical therapy 2 years ago with no change. She still continues to do her home exercises without relief. Today, 11/16/2022, patient presents for planned follow-up. At previous visit she completed trigger point injections in office with me.  She states it didn't kick in for 6 days, and then she noticed 3 weeks of great relief! She had minimal headaches during that time. She then knocked a bottle out of the fridge and hit her chest, had a sinus cold and seemed like things got worse. Now back to baseline and having headache, tight and aching in the neck, has not been sleeping well. Denies any new numbness, tingling, or loss of vision. Continues with OTC Ibuprofen 200 mg with mild relief. History of Interventions:   Surgery: No previous cervical surgeries  Injections: TPI's (10/05/2022) - 3 weeks relief     Current Treatment Medications:   Ibuprofen- mild relief    Historical Treatment Medications:   Aleve- no relief  Muscle relaxer's - no relief, makes her sleepy     [email or web address]  PROCEDURE: MRI CERVICAL SPINE WO CONTRAST       CLINICAL INFORMATION: Cervicogenic headache, Paresthesia of both hands . No known injury. COMPARISON: Cervical spine radiographs dated 2020. TECHNIQUE: Sagittal T1, T2 and STIR sequences were obtained through the cervical spine. Axial fast and echo and gradient echo T2-weighted images were obtained. FINDINGS:   There is anatomic vertebral body height and alignment. Marrow signal is within normal limits. The craniocervical junction appears intact. The cervical spinal cord is normal in caliber without focal area of abnormal T2 signal. Paraspinal soft tissues are    unremarkable. At C2-3 there is no significant spinal canal or neuroforaminal stenosis. C3-4 there is no significant spinal canal or neuroforaminal stenosis. At C4-5 there is no significant spinal canal or neuroforaminal stenosis. At C5-6 there is a minimal disc bulge and posterior ligament flavum thickening without significant spinal canal stenosis. There is moderate left neural foraminal stenosis in association with facet hypertrophy. At C6-7 there is a shallow disc bulge with mild ligament flavum thickening without significant spinal canal or neuroforaminal stenosis.    At C7-T1 there is no significant spinal canal or neuroforaminal stenosis. Impression    Overall mild degenerative changes of the cervical spine more focally pronounced at C5-6 where there is moderate left neural foraminal stenosis in association with left facet hypertrophy and ligamentum flavum thickening and minimal disc bulge.        Past Medical History:   Diagnosis Date    Hyperlipidemia 12/2013    Nye Miguel    Migraine 1998    TMJ (dislocation of temporomandibular joint) 10/12/2016    ED visit       Past Surgical History:   Procedure Laterality Date    APPENDECTOMY  1987    COLONOSCOPY      [facility] BREAST BIOPSY LEFT Left 4/6/2021    [facility] BREAST BIOPSY LEFT 4/6/2021 [facility]    [place] COLON CA SCRN NOT  [address] IND N/A 7/3/2018    COLONOSCOPY performed by Jameel Antony MD at [address] [place] [address]      at 13 yrs old       Family History   Problem Relation Age of Onset    Heart Attack Mother 59        due to medication    Other Brother         collitis    Heart Disease Brother     Heart Disease Brother          Medications & Allergies:   Current Outpatient Medications   Medication Instructions    aspirin 81 mg, Oral, DAILY    Coenzyme Q10 (CO Q 10) 100 MG CAPS 1 capsule, Oral, DAILY    GARLIC PO 1 tablet, Oral, DAILY    lovastatin (MEVACOR) 20 MG tablet TAKE ONE TABLET BY MOUTH NIGHTLY    Multiple Vitamins-Minerals (THERAPEUTIC MULTIVITAMIN-MINERALS) tablet 1 tablet, Oral, DAILY       Allergies   Allergen Reactions    Amoxicillin        Review of Systems:   Constitutional: negative for weight changes or fevers  Genitourinary: negative for bowel/bladder incontinence   Musculoskeletal: positive for neck pain  Neurological: negative for any arm weakness or numbness/tingling  Behavioral/Psych: negative for anxiety/depression   All other systems reviewed and are negative    Objective:     Vitals:    11/16/22 1053   BP: 122/72     Constitutional: Pleasant, no acute distress   Head: Normocephalic, atraumatic   Eyes: Conjunctivae normal   Neck: Supple, symmetrical   Respiration: Non-labored breathing   Cardiovascular: Limbs warm and well perfused   Musculoskeletal: Full cervical ROM in all planes. positive Spurling maneuver bilaterally. Mild increased pain with facet loading. tenderness to palpation in cervical paraspinals or other posterior neck musculature. Neuro: Alert, oriented. CN II-XII appear grossly intact. Motor strength 5/5 SAb, EF, EE, WE, Sapphire. LT sensation intact in upper limbs. Skin: no skin rashes or lesions noted   Psychological: Cooperative, no exaggerated pain behaviors        Assessment:    Diagnosis Orders   1. Bulge of cervical disc without myelopathy        2. Foraminal stenosis of cervical region        3. Cervical spondylosis        4. Facet hypertrophy of cervical region        5. Myofascial pain        6. Chronic pain syndrome               Deshawn Cao is a 63 y. o.female presenting to the pain clinic for evaluation of neck pain. She is noted to have significant myofascial pain on examination. Did discuss with her procedures to assist with pain control such as; bilateral transforaminal cervical epidural steroid injection at C5-6, facet injections at C5-6, occipital nerve block, trigger point injections. Also discussed formal physical therapy with dry needling or cupping. At this time she would like to start with trigger point injections in the office with me today. She is educated to continue over-the-counter's, topicals, ice, heat, stretching, home exercises and I will see her back in 6 weeks to reevaluate. With significant relief from myofascial pain with methocarbamol trigger point junctions at previous visit, did discuss repeat and trial with a steroid but she was agreeable to this. Please see note below. She is also interested in trialing physical therapy with dry needling as well.   I have ordered this and I will see her back in 10 weeks to reevaluate. She can continue over-the-counter's, topicals, ice, heat, stretching, home exercises. Plan: The following treatment recommendations and plan were discussed in detail with Lizzie Mendoza. Imaging:   I have reviewed patients imaging of cervical MRI and results were discussed with patient today. Analgesics:   Patient is taking Acetaminophen. Patient informed that the maximum amount of acetaminophen taken on a regular basis should only be 4000 mg per day. Patient is taking ibuprofen. Patient is advised to take as prescribed and not take on an empty stomach. Adjuvants:   None    Interventions: With examination consistent with cervical myofascial pain, we will proceed cervical and surrounding skin which are trigger point injections, the risks and benefits were discussed in detail with the patient. Patient wants to proceed with the injection with me today    Anticoagulation/NPO Recommendations:   None    Multidisciplinary Pain Management:   In the presence of complex, chronic, and multi-factorial pain, the importance of a multidisciplinary approach to pain management in the patients management regimen was emphasized and discussed in great detail. The patient was also advised to continue physical therapy and stretching exercises at home and cognitive behavioral and/or group therapy. Referrals:  [facility] physical therapy with dry needling    Prescriptions Written This Visit:   None    Follow-up:   10 weeks    It was my pleasure to evaluate Lizzie Mendoza today. I spent over 35 minutes evaluating this patient, reviewing previous notes and images and completing documentation.        DHIRAJ Gutiérrez CNP   Interventional Pain Management/PM&R   [place]     Procedure  Visit Date: 11/16/22      Lizzie Mendoza is a 63 y.o. female presents today in the office for the following:  Chief Complaint   Patient presents with    Follow-up History of Present Illness   Diya Ge is here today for trigger point injections. Pre-Op Diagnosis   Myofacial pain syndrome     Post-Op Diagnosis   Myofacial pain syndrome     Procedure: Trigger point injection(s)    Procedure Documentation   Patient identified. Consent signed. Site identified. Trigger points were identified in the bilateral cervical paraspinals, bilateral trapezius, bilateral rhomboids, for a total of 20 trigger point injections. Then with a 25g needle entered each trigger point and after negative aspiration, 1 cc of a mixture containing 1 cc of 40 mg kenalog and 9 cc of  0.5% bupivacaine was injected at each trigger point for a total of 20 trigger points. Procedural Complications: None      Vitals:    11/16/22 1053   BP: 122/72   Site: Left Upper Arm   Position: Sitting   Weight: 115 lb (52.2 kg)   Height: 5' 6\" (1.676 m)       Conclusion   No complications encountered. Patient discharged stable condition. Patient told if any problems to call office or go to ER. No orders of the defined types were placed in this encounter.       Electronically signed by DHIRAJ Carreon CNP 11/16/22

## 2022-11-16 NOTE — PROGRESS NOTES
Pt. Reported some dizziness after last injections. This nurse checked blood pressure after this round of injections in which it was better than the start of her visit. BP charted. Pt stated she \"felt fine\" and was walked to the lobby.

## 2022-12-06 ENCOUNTER — HOSPITAL ENCOUNTER (OUTPATIENT)
Dept: PHYSICAL THERAPY | Age: 62
Setting detail: THERAPIES SERIES
Discharge: HOME | End: 2022-12-06
Payer: MEDICAID

## 2022-12-06 PROCEDURE — 97161 PT EVAL LOW COMPLEX 20 MIN: CPT

## 2022-12-06 PROCEDURE — 97140 MANUAL THERAPY 1/> REGIONS: CPT

## 2022-12-06 NOTE — PROGRESS NOTES
** PLEASE SIGN, DATE AND TIME CERTIFICATION BELOW AND RETURN TO [facility] OUTPATIENT REHABILITATION (FAX #: [phone]). ATTEST/CO-SIGN IF ACCESSING VIA IN[employer]. THANK YOU.**    I certify that I have examined the patient below and determined that Physical Medicine and Rehabilitation service is necessary and that I approve the established plan of care for up to 90 days or as specifically noted. Attestation, signature or co-signature of physician indicates approval of certification requirements.    ________________________ ____________ __________  Physician Signature   Date   Time  [address] [facility]  PHYSICAL THERAPY  [x] EVALUATION  [] DAILY NOTE (LAND) [] DAILY NOTE (AQUATIC ) [] PROGRESS NOTE [] DISCHARGE NOTE    [x] [address] [facility]   [] [place] [address]    [] [facility]   [] [facility]    Date: 2022  Patient Name:  Evan Mayo  : 1960  MRN: [id number]  CSN: [id number]    Referring Practitioner ROSY Baez*   Diagnosis Bulge of cervical disc without myelopathy [M50.30]  Foraminal stenosis of cervical region [M48.02]  Cervical spondylosis [M47.812]  Facet hypertrophy of cervical region [M47.812]  Myofascial pain [M79.18]  Chronic pain syndrome [G89.4]    Treatment Diagnosis Chronic neck pain, shoulder and postural weakness, headaches   Date of Evaluation 22    Additional Pertinent History Arthritis      Functional Outcome Measure Used NDI   Functional Outcome Score 16/50=32% (22)       Insurance: Primary: Payor: [employer] /  /  / ,   Secondary:    Authorization Information: INSURANCE THERAPY BENEFIT: No precert until after 29QS visit. Visit Limit Based on Precert  AQUATIC THERAPY COVERED:   Yes  MODALITIES COVERED:  Yes except for Iontophoresis and Hot/Cold Packs.   TELEHEALTH COVERED: Yes   Visit # 1, 1/10 for progress note   Visits Allowed: 30   Recertification Date: 70 Physician Follow-Up: 1/30/23   Physician Orders:    History of Present Illness: Pt presents with chronic neck pain without surgery. Pain aggravated with sitting on certain surfaces. Neck starts to ache. Pt occasaionlly able to apply pressure to neck for relief. Walking helps with pain. Light weight training with UEs. Pt develops headaches that start in neck and wrap up and over to front of head. Lying on right side causing headaches. Pt uses ibuprofent for pain and has muscle relaxers but doesn't use d/t side effect of drowsiness. Pt sleeps with contoured pillow. Pt alternates use of heat and ice for pain control. Pt received muscle relaxer injection in neck which helped and most recently a steroid injection which has lasted much longer. Pt has minimal radicular symptoms and dizziness since injections Pt has completed PT in the past and continues to do her stretches and retractions. MRI in May showed Overall degenerative changes of the cervical spine, most pronounced at C5-C6 where there are several changes including some stenosis and minimal disc bulging. SUBJECTIVE: Pt feeling stiff this morning and took Ibuprofen at 5:30am.     Social/Functional History and Current Status:  Medications and Allergies have been reviewed and are listed on Medical History Questionnaire. Krystyna Limon lives alone in a single story home with stairs and no handrail to enter. Task Previous Current   ADLs  Independent Independent- neck gets irritated after doing her hair   IADL's Independent Independent- cooking aggravates neck, neck pain with kneeling with clean, no issues with laundry   Ambulation Independent Independent   Transfers Independent Independent   Recreation Independent- niharika, sew, craft Independent   Community Integration Independent Independent   Driving Active  Active    Work Part-Time. 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 Objective:    GENERAL   Pain 3/10 neck, slightly in upper traps   Palpation Tender and tight B upper traps and levator, paraspinals   Sensation intact   Observation    Edema    Accessory Motion          POSTURE     Comments   Forward Head     Rounded Shoulders x    Kyphosis     Lordosis     Lateral Shift     Scoliosis         NECK RANGE OF MOTION   Flexion 25   Extension 50   Rotation Right 58   Rotation Left 63- triggered headache end range   Sidebending Right 28 with stiffness   Sidebending Left 29 with stiffness   Retraction    Protraction    [facility] Range [facility] Motion is [facility]  []     UPPER EXTREMITY RANGE OF MOTION    Left Right Comments   Shoulder Flexion      Shoulder Extension      Shoulder Abduction      Shoulder Adduction      Shoulder External Rotation      Shoulder Internal Rotation      Shoulder Range of Motion is [facility]  [x]      Elbow Flexion      Elbow Extension      Elbow Range of Motion is [facility]  [x]     UPPER EXTREMITY STRENGTH    Left Right Comments   Shoulder Flexion 4- 4-    Shoulder Extension 4 4    Shoulder Abduction 4- 4-    Shoulder Horizontal ABD 3+ 3+    Latts 3+ 3+    Shoulder External Rotation 4 4-    Shoulder Internal Rotation 4 4    []  Shoulder Strength is grossly WFL. Elbow Flexion 5 5    Elbow Extension 4- 4-    Wrist extension 5 5    [] Elbow Strength is grossly WFL.  Strength          SPECIAL TESTS (+/-)    Left Right Comments   Cerv. Compression - -    Cerv. Distraction - -    Cerv.  Alar/Transverse      Vertebral Artery      Spurling's - -    Adson's      Jayshree          TREATMENT   Precautions:    Pain:     \"X in shaded column indicates activity completed today    *\" next to exercise/intervention indicates progression   Modalities Parameters/  Location  Notes                     Manual Therapy Time/Technique  Notes   Traction, MRF upper traps and levators 8 min                 Exercise/Intervention   Notes   Upper trap, levator stretches, upper thoracic stretch   x HEP Supine neck retraction 10  x    Seated neck retraction 10  x                                                       Specific Interventions Next Treatment: headache management, postural and shoulder strengthening, cervical stabilization, modalities    Activity/Treatment Tolerance:  [x]  Patient tolerated treatment well  []  Patient limited by fatigue  []  Patient limited by pain   []  Patient limited by medical complications  []  Other:     Assessment: 63year old presents with chronic neck pain with intermittent radiculopathy but less frequent since injections. Pt demos good ROM but end range rotation triggers headaches. Pt demos tight cervical muscles. Pt weak in neck, shoulder and postural muscles affecting sitting tolerance, cooking, cleaning, and exercise. Pt didn't have a headache at end of session. Pt will benefit from skilled PT to decrease pain and improve strength and posture. Body Structures/Functions/Activity Limitations: impaired strength, pain, and abnormal posture  Prognosis: good    GOALS:  Patient Goal: Decrease pain and headaches    Short Term Goals: 4 weeks  Patient will demonstrate good postural awareness without cues during therapy session to carry over to sitting to niharika, sew, etc.  Patient will improve postural strength to 4-/5 for symptom control and tolerance to sitting on various surfaces. Patient will improve bilateral shoulder strength to 4+/5 for ease lifting/carrying laundry. Long Term Goals: 8 weeks  Patient will report 50% reduction in frequency of headaches to tolerate lying on right side to sleep and look over her shoulder. Patient will be independent and compliant with HEP daily to achieve above goals.        Patient Education:   [x]  HEP/Education Completed: Plan of Care, Goals, continue with stretches and retractions  [employer] Access Code:  []  No new Education completed  []  Reviewed Prior HEP      [x]  Patient verbalized and/or demonstrated understanding of education provided. []  Patient unable to verbalize and/or demonstrate understanding of education provided. Will continue education. []  Barriers to learning:     PLAN:  Treatment Recommendations: Strengthening, Range of Motion, Manual Therapy - Soft Tissue Mobilization, Home Exercise Program, Patient Education, Integrative Dry Needling, and Modalities    [x]  Plan of care initiated. Plan to see patient 2 times per week for 8 weeks to address the treatment planned outlined above.   []  Continue with current plan of care  []  Modify plan of care as follows:    []  Hold pending physician visit  []  Discharge    Time In 0700   Time Out 0745   Timed Code Minutes: 10 min   Total Treatment Time: 45 min       Electronically Signed by: Germaine Torres PT

## 2022-12-13 ENCOUNTER — HOSPITAL ENCOUNTER (OUTPATIENT)
Dept: PHYSICAL THERAPY | Age: 62
Setting detail: THERAPIES SERIES
Discharge: HOME | End: 2022-12-13
Payer: MEDICAID

## 2022-12-13 PROCEDURE — 97140 MANUAL THERAPY 1/> REGIONS: CPT

## 2022-12-13 PROCEDURE — 97110 THERAPEUTIC EXERCISES: CPT

## 2022-12-13 NOTE — PROGRESS NOTES
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 Visit Limit Based on Precert  AQUATIC THERAPY COVERED:   Yes  MODALITIES COVERED:  Yes except for Iontophoresis and Hot/Cold Packs. TELEHEALTH COVERED: Yes   Visit # 2, 2/10 for progress note   Visits Allowed: 30   Recertification Date: 0/10/15   Physician Follow-Up: 23   Physician Orders:    History of Present Illness: Pt presents with chronic neck pain without surgery. Pain aggravated with sitting on certain surfaces. Neck starts to ache. Pt occasaionlly able to apply pressure to neck for relief. Walking helps with pain. Light weight training with UEs. Pt develops headaches that start in neck and wrap up and over to front of head. Lying on right side causing headaches. Pt uses ibuprofent for pain and has muscle relaxers but doesn't use d/t side effect of drowsiness. Pt sleeps with contoured pillow.  Pt alternates use of heat and ice for pain control. Pt received muscle relaxer injection in neck which helped and most recently a steroid injection which has lasted much longer. Pt has minimal radicular symptoms and dizziness since injections Pt has completed PT in the past and continues to do her stretches and retractions. MRI in May showed Overall degenerative changes of the cervical spine, most pronounced at C5-C6 where there are several changes including some stenosis and minimal disc bulging. SUBJECTIVE: Pt reports mild stiffness this morning and denies need for pain medication. States no headaches since initial evaluation. Reports compliance with HEP. TREATMENT   Precautions:    Pain:     \"X in shaded column indicates activity completed today    *\" next to exercise/intervention indicates progression   Modalities Parameters/  Location  Notes                     Manual Therapy Time/Technique  Notes   MRF upper traps and levators 8 min x Bilateral restrictions present R >L                Exercise/Intervention   Notes   Upper trap, levator stretches, upper thoracic stretch    HEP          Supine:       Neck retraction 10  x    Neck rotation, lateral* x10 3 sec x    Scapular retraction* x10 3 sec x    Elbow press into mat table*  x10 3 sec x    Pec minor/major stretch* x3 20 sec x    Chin tuck +shoulder flexion, horizontal abduction*  x10  x Cueing to maintain chin tuck          Seated:       neck retraction 10  x    Retro shoulder rolls* x10  x                                                Specific Interventions Next Treatment: headache management, postural and shoulder strengthening, cervical stabilization, modalities    Activity/Treatment Tolerance:  [x]  Patient tolerated treatment well  []  Patient limited by fatigue  []  Patient limited by pain   []  Patient limited by medical complications  []  Other:     Assessment: Initiated therex as shown above per flow sheet.  Pt tolerated treatment well and denied adverse reactions. Cueing for proper technique needed. Bilateral restrictions present in UT region R>L. Slight reduction upon completion of manual therapy. Pt noted feeling good at end of session. Will continue to progress as tolerated. Body Structures/Functions/Activity Limitations: impaired strength, pain, and abnormal posture  Prognosis: good    GOALS:  Patient Goal: Decrease pain and headaches    Short Term Goals: 4 weeks  Patient will demonstrate good postural awareness without cues during therapy session to carry over to sitting to niharika, sew, etc.  Patient will improve postural strength to 4-/5 for symptom control and tolerance to sitting on various surfaces. Patient will improve bilateral shoulder strength to 4+/5 for ease lifting/carrying laundry. Long Term Goals: 8 weeks  Patient will report 50% reduction in frequency of headaches to tolerate lying on right side to sleep and look over her shoulder. Patient will be independent and compliant with HEP daily to achieve above goals. Patient Education:   []  HEP/Education Completed: Plan of Care, Goals, continue with stretches and retractions  Mindshapes Access Code:  []  No new Education completed  []  Reviewed Prior HEP      [x]  Patient verbalized and/or demonstrated understanding of education provided. []  Patient unable to verbalize and/or demonstrate understanding of education provided. Will continue education. []  Barriers to learning:     PLAN:  Treatment Recommendations: Strengthening, Range of Motion, Manual Therapy - Soft Tissue Mobilization, Home Exercise Program, Patient Education, Integrative Dry Needling, and Modalities    []  Plan of care initiated. Plan to see patient 2 times per week for 8 weeks to address the treatment planned outlined above.   [x]  Continue with current plan of care  []  Modify plan of care as follows:    []  Hold pending physician visit  []  Discharge    Time In 0800   Time Out 0830   Timed Code Minutes: 30 Total Treatment Time: 30       Electronically Signed by: Zahida Simental PTA

## 2022-12-19 ENCOUNTER — OFFICE VISIT (OUTPATIENT)
Dept: FAMILY MEDICINE CLINIC | Age: 62
End: 2022-12-19
Payer: MEDICAID

## 2022-12-19 ENCOUNTER — TELEPHONE (OUTPATIENT)
Dept: FAMILY MEDICINE CLINIC | Age: 62
End: 2022-12-19

## 2022-12-19 VITALS
OXYGEN SATURATION: 98 % | WEIGHT: 113.4 LBS | SYSTOLIC BLOOD PRESSURE: 136 MMHG | RESPIRATION RATE: 12 BRPM | DIASTOLIC BLOOD PRESSURE: 80 MMHG | TEMPERATURE: 97.7 F | BODY MASS INDEX: 18.23 KG/M2 | HEART RATE: 71 BPM | HEIGHT: 66 IN

## 2022-12-19 DIAGNOSIS — J02.9 SORE THROAT: Primary | ICD-10-CM

## 2022-12-19 PROCEDURE — 3017F COLORECTAL CA SCREEN DOC REV: CPT | Performed by: STUDENT IN AN ORGANIZED HEALTH CARE EDUCATION/TRAINING PROGRAM

## 2022-12-19 PROCEDURE — G8484 FLU IMMUNIZE NO ADMIN: HCPCS | Performed by: STUDENT IN AN ORGANIZED HEALTH CARE EDUCATION/TRAINING PROGRAM

## 2022-12-19 PROCEDURE — G8427 DOCREV CUR MEDS BY ELIG CLIN: HCPCS | Performed by: STUDENT IN AN ORGANIZED HEALTH CARE EDUCATION/TRAINING PROGRAM

## 2022-12-19 PROCEDURE — G8419 CALC BMI OUT NRM PARAM NOF/U: HCPCS | Performed by: STUDENT IN AN ORGANIZED HEALTH CARE EDUCATION/TRAINING PROGRAM

## 2022-12-19 PROCEDURE — 99213 OFFICE O/P EST LOW 20 MIN: CPT | Performed by: STUDENT IN AN ORGANIZED HEALTH CARE EDUCATION/TRAINING PROGRAM

## 2022-12-19 PROCEDURE — 87651 STREP A DNA AMP PROBE: CPT | Performed by: STUDENT IN AN ORGANIZED HEALTH CARE EDUCATION/TRAINING PROGRAM

## 2022-12-19 PROCEDURE — 1036F TOBACCO NON-USER: CPT | Performed by: STUDENT IN AN ORGANIZED HEALTH CARE EDUCATION/TRAINING PROGRAM

## 2022-12-19 NOTE — TELEPHONE ENCOUNTER
Pt. Says that if she ends up needing an antibiotic sent in, she wants it to go to the [facility] in [address] [facility] instead of her normal pharmacy.

## 2022-12-19 NOTE — PROGRESS NOTES
Health Maintenance Due   Topic Date Due    COVID-19 Vaccine (1) Never done    Flu vaccine (1) 08/01/2022    Cervical cancer screen  08/01/2022

## 2022-12-19 NOTE — PATIENT INSTRUCTIONS
Thank you   Thank you for trusting us with your healthcare needs. You may receive a survey regarding today's visit. It would help us out if you would take a few moments to provide your feedback. We value your input. Please bring in ALL medications BOTTLES, including inhalers, herbal supplements, over the counter, prescribed & non-prescribed medicine. The office would like actual medication bottles and a list.   Please note our OFFICE POLICIES:   Prior to getting your labs drawn, please check with your insurance company for benefits and eligibility of lab services. Often, insurance companies cover certain tests for preventative visits only. It is patient's responsibility to see what is covered. We are unable to change a diagnosis after the test has been performed. Lab orders will not be re-printed. Please hold onto your original lab orders and take them to your lab to be completed. If you no show your scheduled appointment three times, you will be dismissed from this practice. Reschedules must be completed 24 hours prior to your schedule appointment. If the list below has been completed, PLEASE FAX RECORDS TO OUR OFFICE @ [phone].  Once the records have been received we will update your records at our office:  Health Maintenance Due   Topic Date Due    COVID-19 Vaccine (1) Never done    Flu vaccine (1) 08/01/2022    Cervical cancer screen  08/01/2022

## 2022-12-19 NOTE — PROGRESS NOTES
[address] [facility] [address] [address]  Dept: [phone]  Loc: [phone]      Janine Owens (:  1960) is a 63 y.o. female,Established patient, here for evaluation of the following chief complaint(s):  Pharyngitis (Started 12 days ago)      ASSESSMENT/PLAN:  1. Sore throat  - Acute mild sore throat secondary to URI from a week and a half ago. Exposure to strep throat and grandson, tested negative for strep in office today. - Recommend rest, hydration, warm salt water rinses, honey for sore throat. -     POCT Rapid Strep A DNA (Alere i)    Follow-up as scheduled with Dr. Sharda Wong or sooner if no improvement or worsening of symptoms. Return for As needed, pt instructed to call with any concerns. SUBJECTIVE/OBJECTIVE:  LEVON Ocasio is a 53-year-old female, scheduled the same day for persistent sore throat. Reports cold-like symptoms 1.5 weeks ago with cough, congestion and runny nose. Reports cough and rhinorrhea improved however has mild persistent sore throat. 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 History of tonsillectomy. Review of Systems  Constitutional: Negative for chills, diaphoresis and fever. HENT: Positive for sore throat. Eyes: Negative for redness and visual disturbance. Respiratory: Negative for cough, chest tightness and shortness of breath. Cardiovascular: Negative for palpitations and leg swelling. Gastrointestinal: Negative for abdominal distention, abdominal pain and nausea. Genitourinary: Negative for difficulty urinating and dysuria. Musculoskeletal: Negative for back pain and neck pain. Skin: Negative for color change and pallor. Neurological: Negative for dizziness and light-headedness. Psychiatric/Behavioral: Negative for agitation and confusion.  The patient is not nervous/anxious    Physical Exam  General appearance: No apparent distress, appears stated age and cooperative. HEENT: Mild erythema of the throat, cerumen in ears bilaterally nonedematous with no bulging tympanic membrane. No lymphadenopathy on exam.  Neck: No lymphadenopathy appreciated on exam.  Supple, with full range of motion. No jugular venous distention. Trachea midline. Respiratory:  Normal respiratory effort. Clear to auscultation, bilaterally without Rales/Wheezes/Rhonchi. Cardiovascular: Regular rate and rhythm with normal S1/S2 without murmurs, rubs or gallops. Abdomen: Soft, non-tender, non-distended with normal bowel sounds. Musculoskeletal: passive and active ROM x 4 extremities. Skin: Skin color, texture, turgor normal.  No rashes or lesions. Neurologic:  Neurovascularly intact without any focal sensory/motor deficits. Cranial nerves: II-XII intact, grossly non-focal.  Psychiatric: Alert and oriented, thought content appropriate, normal insight  Capillary Refill: Brisk,< 3 seconds   Peripheral Pulses: +2 palpable, equal bilaterally       Vitals:    12/19/22 1121   BP: 136/80   Site: Right Upper Arm   Position: Sitting   Cuff Size: Medium Adult   Pulse: 71   Resp: 12   Temp: 97.7 °F (36.5 °C)   TempSrc: Temporal   SpO2: 98%   Weight: 113 lb 6.4 oz (51.4 kg)   Height: 5' 6\" (1.676 m)         An electronic signature was used to authenticate this note.     --Dario Ro MD

## 2022-12-19 NOTE — PROGRESS NOTES
S: 63 y.o. female with   Chief Complaint   Patient presents with    Pharyngitis     Started 12 days ago       HPI: please see resident note for HPI and ROS. BP Readings from Last 3 Encounters:   12/19/22 136/80   11/16/22 122/78   10/05/22 128/72     Wt Readings from Last 3 Encounters:   12/19/22 113 lb 6.4 oz (51.4 kg)   11/16/22 115 lb (52.2 kg)   10/05/22 115 lb (52.2 kg)       O: VS:  height is 5' 6\" (1.676 m) and weight is 113 lb 6.4 oz (51.4 kg). Her temporal temperature is 97.7 °F (36.5 °C). Her blood pressure is 136/80 and her pulse is 71. Her respiration is 12 and oxygen saturation is 98%. Physical exam performed by resident physician. Diagnosis Orders   1. Sore throat  POCT Rapid Strep A DNA (Alere i)          Plan:  Please refer to resident note for full plan. 79-year-old female presents the office for concerns of sore throat that started approximately 12 days ago. Patient's symptoms did start post viral upper respiratory tract infection from posterior nasal drainage. Point-of-care rapid strep testing in the office negative. Plan to treat as above. Patient was also educated on conservative measures including rest, hydration, salt water gargles, lozenges. Patient verbalizes understanding of plan and is agreeable to above. Health Maintenance Due   Topic Date Due    COVID-19 Vaccine (1) Never done    Flu vaccine (1) 08/01/2022    Cervical cancer screen  08/01/2022       Attending Physician Statement  I have discussed the case, including pertinent history and exam findings with the resident. I agree with the documented assessment and plan as documented by the resident.   CASSIA Alvares,  12/19/2022 1:19 PM

## 2022-12-22 ENCOUNTER — HOSPITAL ENCOUNTER (OUTPATIENT)
Dept: PHYSICAL THERAPY | Age: 62
Setting detail: THERAPIES SERIES
Discharge: HOME | End: 2022-12-22
Payer: MEDICAID

## 2022-12-22 PROCEDURE — 97110 THERAPEUTIC EXERCISES: CPT

## 2022-12-22 NOTE — PROGRESS NOTES
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 Visit Limit Based on Precert  AQUATIC THERAPY COVERED:   Yes  MODALITIES COVERED:  Yes except for Iontophoresis and Hot/Cold Packs. TELEHEALTH COVERED: Yes   Visit # 3, 3/10 for progress note   Visits Allowed: 30   Recertification Date: 54   Physician Follow-Up: 23   Physician Orders:    History of Present Illness: Pt presents with chronic neck pain without surgery. Pain aggravated with sitting on certain surfaces. Neck starts to ache. Pt occasaionlly able to apply pressure to neck for relief. Walking helps with pain. Light weight training with UEs. Pt develops headaches that start in neck and wrap up and over to front of head. Lying on right side causing headaches. Pt uses ibuprofent for pain and has muscle relaxers but doesn't use d/t side effect of drowsiness. Pt sleeps with contoured pillow.  Pt alternates use of heat and ice for pain control. Pt received muscle relaxer injection in neck which helped and most recently a steroid injection which has lasted much longer. Pt has minimal radicular symptoms and dizziness since injections Pt has completed PT in the past and continues to do her stretches and retractions. MRI in May showed Overall degenerative changes of the cervical spine, most pronounced at C5-C6 where there are several changes including some stenosis and minimal disc bulging. SUBJECTIVE: Pt reports mild neck has felt good since having steroid injections 11/16/22. Pt notes after doing exercise last session, she felt nauseous all day, which is her normal response to exercise of UEs. Pt hasn't felt nauseous at home, but doesn't do repetitive exercise. Pt has only had 1-2 headaches since injections. Pt tolerates walking. Pt compliant with HEP.      OBJECTIVE:  TREATMENT   Precautions:    Pain:     \"X in shaded column indicates activity completed today    *\" next to exercise/intervention indicates progression   Modalities Parameters/  Location  Notes                     Manual Therapy Time/Technique  Notes   MRF upper traps and levators 8 min  Bilateral restrictions present R >L                Exercise/Intervention   Notes   Upper trap, levator stretches, upper thoracic stretch 3x20 sec  x           Supine:       Neck retraction 10      Neck rotation, lateral x10 3 sec     Scapular retraction x10 3 sec     Elbow press into mat table  x10 3 sec     Pec minor and major stretch x3 20 sec eac x    Chin tuck +shoulder flexion, horizontal abduction x10   Cueing to maintain chin tuck          Seated:       neck retraction 10  x    Retro shoulder rolls 10x3 sec  x    Scapular retraction* 10x3 sec  x    Neck rotation ROM* 10x3 sec  x    Posterior capsule shoulder stretch* 3x20 sec  x                           Specific Interventions Next Treatment: headache management, postural and shoulder strengthening, cervical stabilization, modalities    Activity/Treatment Tolerance:  [x]  Patient tolerated treatment well  []  Patient limited by fatigue  []  Patient limited by pain   []  Patient limited by medical complications  []  Other:     Assessment: Focused on ROM and stretching, holding repetitive UE exercises. No adverse effects to session. Due to patient limitation with exercise, plan to discharge next week as long as symptoms remain controlled. GOALS:  Patient Goal: Decrease pain and headaches    Short Term Goals: 4 weeks  Patient will demonstrate good postural awareness without cues during therapy session to carry over to sitting to niharika, sew, etc.  Patient will improve postural strength to 4-/5 for symptom control and tolerance to sitting on various surfaces. Patient will improve bilateral shoulder strength to 4+/5 for ease lifting/carrying laundry. Long Term Goals: 8 weeks  Patient will report 50% reduction in frequency of headaches to tolerate lying on right side to sleep and look over her shoulder. Patient will be independent and compliant with HEP daily to achieve above goals. Patient Education:   [x]  HEP/Education Completed: posterior capsule stretch  Medbridge Access Code:  []  No new Education completed  []  Reviewed Prior HEP      [x]  Patient verbalized and/or demonstrated understanding of education provided. []  Patient unable to verbalize and/or demonstrate understanding of education provided. Will continue education. []  Barriers to learning:     PLAN:  Treatment Recommendations: Strengthening, Range of Motion, Manual Therapy - Soft Tissue Mobilization, Home Exercise Program, Patient Education, Integrative Dry Needling, and Modalities    []  Plan of care initiated. Plan to see patient 2 times per week for 8 weeks to address the treatment planned outlined above.   [x]  Continue with current plan of care  []  Modify plan of care as follows:    []  Hold pending physician visit  [] Discharge    Time In 0740   Time Out 0805   Timed Code Minutes: 25   Total Treatment Time: 25       Electronically Signed by: Asia Judge, PT

## 2022-12-23 ENCOUNTER — APPOINTMENT (OUTPATIENT)
Dept: PHYSICAL THERAPY | Age: 62
End: 2022-12-23
Payer: MEDICAID

## 2022-12-29 ENCOUNTER — HOSPITAL ENCOUNTER (OUTPATIENT)
Dept: PHYSICAL THERAPY | Age: 62
Setting detail: THERAPIES SERIES
Discharge: HOME | End: 2022-12-29
Payer: MEDICAID

## 2022-12-29 PROCEDURE — 97110 THERAPEUTIC EXERCISES: CPT

## 2022-12-29 NOTE — DISCHARGE SUMMARY
[address] [facility]  PHYSICAL THERAPY  [] EVALUATION  [] DAILY NOTE (LAND) [] DAILY NOTE (AQUATIC ) [] PROGRESS NOTE [x] DISCHARGE NOTE    [x] OUTPATIENT REHABILITATION [facility]   [] [place] [address]    [] [facility]   [] Gonzalo Batch    Date: 2022  Patient Name:  Gavin Vargas  : 1960  MRN: [id number]  CSN: [id number]    Referring Practitioner ZAHEER Kennedy   Diagnosis Bulge of cervical disc without myelopathy [M50.30]  Foraminal stenosis of cervical region [M48.02]  Cervical spondylosis [M47.812]  Facet hypertrophy of cervical region [M47.812]  Myofascial pain [M79.18]  Chronic pain syndrome [G89.4]    Treatment Diagnosis Chronic neck pain, shoulder and postural weakness, headaches   Date of Evaluation 22    Additional Pertinent History Arthritis      Functional Outcome Measure Used NDI   Functional Outcome Score =32% (22) 50 (22)      Insurance: Primary: Payor: [employer] /  /  / ,   Secondary:    Authorization Information: INSURANCE THERAPY BENEFIT: No precert until after 77ED visit. Visit Limit Based on Precert  AQUATIC THERAPY COVERED:   Yes  MODALITIES COVERED:  Yes except for Iontophoresis and Hot/Cold Packs. TELEHEALTH COVERED: Yes   Visit # 4, 4/10 for progress note   Visits Allowed: 30   Recertification Date:    Physician Follow-Up: 23   Physician Orders:    History of Present Illness: Pt presents with chronic neck pain without surgery. Pain aggravated with sitting on certain surfaces. Neck starts to ache. Pt occasaionlly able to apply pressure to neck for relief. Walking helps with pain. Light weight training with UEs. Pt develops headaches that start in neck and wrap up and over to front of head. Lying on right side causing headaches. Pt uses ibuprofent for pain and has muscle relaxers but doesn't use d/t side effect of drowsiness. Pt sleeps with contoured pillow.  Pt alternates use of heat and ice for pain control. Pt received muscle relaxer injection in neck which helped and most recently a steroid injection which has lasted much longer. Pt has minimal radicular symptoms and dizziness since injections Pt has completed PT in the past and continues to do her stretches and retractions. MRI in May showed Overall degenerative changes of the cervical spine, most pronounced at C5-C6 where there are several changes including some stenosis and minimal disc bulging. SUBJECTIVE: Patient reports that she was nauseous after all exercises last visit and states that they lasted all day. She states that she used to exercise a lot but has stopped because it makes her very nauseous. She states that she walks a lot and does not get nauseous; however, does sometimes feel off balance. She reports that it makes her sad as she does love to exercise. She denies any headaches since her last visit. She reports that she does have a little bit of neck pain today as she had her head down a lot this morning. She states that her neck has felt very good since getting injections and she follows up with them again in January. Patient reports compliance with HEP. States that if she does start to get pain or nausea then doing chin tucks usually help to reduce her pain.         OBJECTIVE:  TREATMENT   Precautions:    Pain:     \"X in shaded column indicates activity completed today    *\" next to exercise/intervention indicates progression   Modalities Parameters/  Location  Notes                     Manual Therapy Time/Technique  Notes   MRF upper traps and levators 8 min  Bilateral restrictions present R >L                Exercise/Intervention   Notes   Upper trap, levator stretches, upper thoracic stretch 3x20 sec  x           Supine:       Neck retraction 10      Neck rotation, lateral x10 3 sec     Scapular retraction x10 3 sec     Elbow press into mat table  x10 3 sec     Pec minor and major stretch x3 20 sec eac     Chin tuck +shoulder flexion, horizontal abduction x10   Cueing to maintain chin tuck          Seated:       neck retraction 10  x    Retro shoulder rolls 10x3 sec  x    Scapular retraction 10x3 sec  x    Neck rotation ROM 10x3 sec  x    Posterior capsule shoulder stretch 3x20 sec  x           Pec stretch at corner* 3x30 sec  x Cues to keep head in neutral position   I's, Y's and T's* x5  x      Specific Interventions Next Treatment: headache management, postural and shoulder strengthening, cervical stabilization, modalities    Activity/Treatment Tolerance:  [x]  Patient tolerated treatment well  []  Patient limited by fatigue  []  Patient limited by pain   []  Patient limited by medical complications  []  Other:     Assessment: Patient reports significant improvement in both pain levels, headaches, and nausea. 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 She demonstrates cervical AROM WNL at this time. She demonstrates an upright posture and understanding of chin tucks to further reduce symptom occurrence. She continues to have nausea with repetitive exercise. She denied any nausea with exercises today and was provided with updated HEP for further cervical and scapular strengthening. Patient encouraged to discontinue exercises that reproduce symptoms and cause nausea. Patient demonstrates improved bilateral shoulder strength and postural strength at this time and is being discharged per her request as symptoms are well managed. GOALS:  Patient Goal: Decrease pain and headaches    Short Term Goals: 4 weeks  Patient will demonstrate good postural awareness without cues during therapy session to carry over to sitting to niharika, sew, etc. GOAL MET:   .  Discontinue Goal    Patient will improve postural strength to 4-/5 for symptom control and tolerance to sitting on various surfaces.  GOAL MET:   .  Cheli Asencio Goal    Patient will improve bilateral shoulder strength to 4+/5 for ease lifting/carrying laundry. GOAL MET:  4+/5. Discontinue Goal      Long Term Goals: 8 weeks  Patient will report 50% reduction in frequency of headaches to tolerate lying on right side to sleep and look over her shoulder. GOAL MET:  headaches only 2 times in the last month instead of 3 times a week; has been able to lay on her right side twice since injections. Discontinue Goal    Patient will be independent and compliant with HEP daily to achieve above goals. GOAL MET:   .  Discontinue Goal        Patient Education:   [x]  HEP/Education Completed: HEP2go - pec stretch, scap retracts, standing shoulder flexion, scaption, abduction, and horizontal abduction; provided with [facility] Access Code:  []  No new Education completed  [x]  Reviewed Prior HEP      [x]  Patient verbalized and/or demonstrated understanding of education provided. []  Patient unable to verbalize and/or demonstrate understanding of education provided. Will continue education. []  Barriers to learning:     PLAN:  Treatment Recommendations: Strengthening, Range of Motion, Manual Therapy - Soft Tissue Mobilization, Home Exercise Program, Patient Education, Integrative Dry Needling, and Modalities    []  Plan of care initiated. Plan to see patient 2 times per week for 8 weeks to address the treatment planned outlined above.   []  Continue with current plan of care  []  Modify plan of care as follows:    []  Hold pending physician visit  [x]  Discharge    Time In 0745   Time Out 0825   Timed Code Minutes: 40   Total Treatment Time: 40       Electronically Signed by: Brandy Cardoso, PT

## 2022-12-30 ENCOUNTER — APPOINTMENT (OUTPATIENT)
Dept: PHYSICAL THERAPY | Age: 62
End: 2022-12-30
Payer: MEDICAID

## 2023-01-06 ENCOUNTER — TELEPHONE (OUTPATIENT)
Dept: FAMILY MEDICINE CLINIC | Age: 63
End: 2023-01-06

## 2023-01-06 NOTE — TELEPHONE ENCOUNTER
----- Message from Jay Dhillon MD sent at 1/5/2023 10:12 PM EST -----  Please let the patient know her result for Strep is negative.   Thank you,  Electronically signed by Angie Mackey MD on 1/5/2023 at 10:12 PM

## 2023-01-30 ENCOUNTER — OFFICE VISIT (OUTPATIENT)
Dept: PHYSICAL MEDICINE AND REHAB | Age: 63
End: 2023-01-30
Payer: MEDICAID

## 2023-01-30 VITALS
WEIGHT: 113 LBS | DIASTOLIC BLOOD PRESSURE: 64 MMHG | SYSTOLIC BLOOD PRESSURE: 124 MMHG | BODY MASS INDEX: 18.16 KG/M2 | HEIGHT: 66 IN

## 2023-01-30 DIAGNOSIS — M47.812 FACET HYPERTROPHY OF CERVICAL REGION: ICD-10-CM

## 2023-01-30 DIAGNOSIS — G89.4 CHRONIC PAIN SYNDROME: ICD-10-CM

## 2023-01-30 DIAGNOSIS — M79.18 MYOFASCIAL PAIN: Primary | ICD-10-CM

## 2023-01-30 DIAGNOSIS — M47.812 CERVICAL SPONDYLOSIS: ICD-10-CM

## 2023-01-30 DIAGNOSIS — M50.30 BULGE OF CERVICAL DISC WITHOUT MYELOPATHY: ICD-10-CM

## 2023-01-30 DIAGNOSIS — M48.02 FORAMINAL STENOSIS OF CERVICAL REGION: ICD-10-CM

## 2023-01-30 PROCEDURE — G8484 FLU IMMUNIZE NO ADMIN: HCPCS | Performed by: NURSE PRACTITIONER

## 2023-01-30 PROCEDURE — G8427 DOCREV CUR MEDS BY ELIG CLIN: HCPCS | Performed by: NURSE PRACTITIONER

## 2023-01-30 PROCEDURE — 1036F TOBACCO NON-USER: CPT | Performed by: NURSE PRACTITIONER

## 2023-01-30 PROCEDURE — 3017F COLORECTAL CA SCREEN DOC REV: CPT | Performed by: NURSE PRACTITIONER

## 2023-01-30 PROCEDURE — 99213 OFFICE O/P EST LOW 20 MIN: CPT | Performed by: NURSE PRACTITIONER

## 2023-01-30 PROCEDURE — G8419 CALC BMI OUT NRM PARAM NOF/U: HCPCS | Performed by: NURSE PRACTITIONER

## 2023-01-30 NOTE — PROGRESS NOTES
Chronic Pain/PM&R Clinic Note     Encounter Date: 1/30/23    Subjective:   Chief Complaint:   Chief Complaint   Patient presents with    Follow-up       History of Present Illness:   Deshawn Cao is a 63 y.o. female seen in the clinic initially on 10/05/2022 upon request from DHIRAJ Singh for her history of neck pain. PmHX of HLD. Patient complains of bilateral neck pain, that does not radiate into her arms. She states at times will go into the tops of her shoulders, and from her neck to the top of her head into the back of the eyes. She states pain has been occurring for 5 years. She reports that at that time she was crocheting, fell asleep with her chin on her chest, slightly bent for few hours and when she woke up had pain. She denies any accident, injury, fall at that time. She states the pain is debilitating at times, makes her feel off balance and nauseated when it worsens. She does state it is a constant ache. She reports that she has noticed certain positions or movements will make her have a headache and feel nauseated, such as standing on her knees, twisting, crossing her legs at times. She states she will also occasionally get blurred vision when this happens. She states she has massage in the past with great relief but only lasted 2 days. She also reports that if she pushes on a certain spot her pain will be alleviated somewhat. She notices pain is better in the summer worse when it is cold out. She reports she used to get some tingling weakness in her arms but has normalized 5 months. She reports she does feel very tight/stiff. She states she completed physical therapy 2 years ago with no change. She still continues to do her home exercises without relief. Today, 11/16/2022, patient presents for planned follow-up. At previous visit she completed trigger point injections in office with me.  She states it didn't kick in for 6 days, and then she noticed 3 weeks of great relief! She had minimal headaches during that time. She then knocked a bottle out of the fridge and hit her chest, had a sinus cold and seemed like things got worse. Now back to baseline and having headache, tight and aching in the neck, has not been sleeping well. Denies any new numbness, tingling, or loss of vision. Continues with OTC Ibuprofen 200 mg with mild relief. Today, 2023, patient presents for planned follow-up. We completed trigger point injections at previous visit, and ordered physical therapy. She states she did go to physical therapy twice, but each time she felt nauseated and worse afterwards. Therapist decided to wait and only do some exercises at home as they did not want to risk causing increasing pain. She reports that since last visit she has been feeling extremely well, states she feels \" normal\", and has been able to tolerate activities with no pain. She states she only had about 2 headaches since her previous visit. She is overall very pleased. She states she has not made any other changes or medications. She denies any new concerns      History of Interventions:   Surgery: No previous cervical surgeries  Injections: TPI's (10/05/2022) - 3 weeks relief   TPIs (2022) - relief     Current Treatment Medications:   Ibuprofen- mild relief    Historical Treatment Medications:   Aleve- no relief  Muscle relaxer's - no relief, makes her sleepy     Imagin2022  PROCEDURE: MRI CERVICAL SPINE WO CONTRAST       CLINICAL INFORMATION: Cervicogenic headache, Paresthesia of both hands . No known injury. COMPARISON: Cervical spine radiographs dated 2020. TECHNIQUE: Sagittal T1, T2 and STIR sequences were obtained through the cervical spine. Axial fast and echo and gradient echo T2-weighted images were obtained. FINDINGS:   There is anatomic vertebral body height and alignment. Marrow signal is within normal limits. The craniocervical junction appears intact. The cervical spinal cord is normal in caliber without focal area of abnormal T2 signal. Paraspinal soft tissues are    unremarkable. At C2-3 there is no significant spinal canal or neuroforaminal stenosis. C3-4 there is no significant spinal canal or neuroforaminal stenosis. At C4-5 there is no significant spinal canal or neuroforaminal stenosis. At C5-6 there is a minimal disc bulge and posterior ligament flavum thickening without significant spinal canal stenosis. There is moderate left neural foraminal stenosis in association with facet hypertrophy. At C6-7 there is a shallow disc bulge with mild ligament flavum thickening without significant spinal canal or neuroforaminal stenosis. At C7-T1 there is no significant spinal canal or neuroforaminal stenosis. Impression    Overall mild degenerative changes of the cervical spine more focally pronounced at C5-6 where there is moderate left neural foraminal stenosis in association with left facet hypertrophy and ligamentum flavum thickening and minimal disc bulge.        Past Medical History:   Diagnosis Date    Hyperlipidemia 12/2013    [facility]    Migraine 1998    TMJ (dislocation of temporomandibular joint) 10/12/2016    ED visit       Past Surgical History:   Procedure Laterality Date    APPENDECTOMY  1987    COLONOSCOPY      [facility] BREAST BIOPSY LEFT Left 4/6/2021    [facility] LOC BREAST BIOPSY LEFT 4/6/2021 Mine Brooks MD [facility]    [place] COLON CA SCRN NOT  W [address] [place] [address] N/A 7/3/2018    COLONOSCOPY performed by Suleiman Estevez MD at [address] [place] [address]      at 13 yrs old       Family History   Problem Relation Age of Onset    Heart Attack Mother 59        due to medication    Other Brother         collitis    Heart Disease Brother     Heart Disease Brother          Medications & Allergies:   Current Outpatient Medications   Medication Instructions    aspirin 81 mg, Oral, DAILY    Coenzyme Q10 (CO Q 10) 100 MG CAPS 1 capsule, Oral, DAILY    GARLIC PO 1 tablet, Oral, DAILY    lovastatin (MEVACOR) 20 MG tablet TAKE ONE TABLET BY MOUTH NIGHTLY    Multiple Vitamins-Minerals (THERAPEUTIC MULTIVITAMIN-MINERALS) tablet 1 tablet, Oral, DAILY       Allergies   Allergen Reactions    Amoxicillin        Review of Systems:   Constitutional: negative for weight changes or fevers  Genitourinary: negative for bowel/bladder incontinence   Musculoskeletal: positive for neck pain  Neurological: negative for any arm weakness or numbness/tingling  Behavioral/Psych: negative for anxiety/depression   All other systems reviewed and are negative    Objective:     Vitals:    01/30/23 1041   BP: 124/64     Constitutional: Pleasant, no acute distress   Head: Normocephalic, atraumatic   Eyes: Conjunctivae normal   Neck: Supple, symmetrical   Respiration: Non-labored breathing   Cardiovascular: Limbs warm and well perfused   Musculoskeletal: Full cervical ROM in all planes. positive Spurling maneuver bilaterally. Mild increased pain with facet loading. tenderness to palpation in cervical paraspinals or other posterior neck musculature. Neuro: Alert, oriented. CN II-XII appear grossly intact. Motor strength 5/5 SAb, EF, EE, WE, [place]. LT sensation intact in upper limbs. Skin: no skin rashes or lesions noted   Psychological: Cooperative, no exaggerated pain behaviors        Assessment:    Diagnosis Orders   1. Myofascial pain        2. Bulge of cervical disc without myelopathy        3. Foraminal stenosis of cervical region        4. Cervical spondylosis        5. Facet hypertrophy of cervical region        6. Chronic pain syndrome            Nelson Goodwin is a 63 y. o.female presenting to the pain clinic for evaluation of neck pain. She is noted to have significant myofascial pain on examination.   Did discuss with her procedures to assist with pain control such as; bilateral transforaminal cervical epidural steroid injection at C5-6, facet injections at C5-6, occipital nerve block, trigger point injections. Also discussed formal physical therapy with dry needling or cupping. At this time she would like to start with trigger point injections in the office with me today. She is educated to continue over-the-counter's, topicals, ice, heat, stretching, home exercises and I will see her back in 6 weeks to reevaluate. With significant relief from myofascial pain with methocarbamol trigger point junctions at previous visit, did discuss repeat and trial with a steroid but she was agreeable to this. Please see note below. She is also interested in trialing physical therapy with dry needling as well. I have ordered this and I will see her back in 10 weeks to reevaluate. She can continue over-the-counter's, topicals, ice, heat, stretching, home exercises. Discussed with significant relief from trigger point injections, can repeat anytime. She is educated continue over-the-counter's, at least, heat, stretching, home exercises and follow back up in 3 months. She is encouraged to call if pain resumes prior to being seen to move appointment up      Plan: The following treatment recommendations and plan were discussed in detail with Janine Owens. Imaging:   I have reviewed patients imaging of cervical MRI and results were discussed with patient today. Analgesics:   Patient is taking Acetaminophen. Patient informed that the maximum amount of acetaminophen taken on a regular basis should only be 4000 mg per day. Patient is taking ibuprofen. Patient is advised to take as prescribed and not take on an empty stomach.      Adjuvants:   None    Interventions:   None    Anticoagulation/NPO Recommendations:   None    Multidisciplinary Pain Management:   In the presence of complex, chronic, and multi-factorial pain, the importance of a multidisciplinary approach to pain management in the patients management regimen was emphasized and discussed in great detail. The patient was also advised to continue physical therapy and stretching exercises at home and cognitive behavioral and/or group therapy. Referrals:      Prescriptions Written This Visit:   None    Follow-up:   3 months    It was my pleasure to evaluate Creta Show today. I spent over 25 minutes evaluating this patient, reviewing previous notes and images and completing documentation.        Jeris Bernheim, APRN - CNP   Interventional Pain Management/PM&R   [place]

## 2023-03-08 DIAGNOSIS — E78.00 HYPERCHOLESTEROLEMIA: ICD-10-CM

## 2023-03-08 RX ORDER — LOVASTATIN 20 MG/1
TABLET ORAL
Qty: 90 TABLET | Refills: 1 | Status: SHIPPED | OUTPATIENT
Start: 2023-03-08

## 2023-03-08 NOTE — TELEPHONE ENCOUNTER
----- Message from [employer] [employer] sent at 3/8/2023 12:16 PM EST -----  Subject: Refill Request    QUESTIONS  Name of Medication? lovastatin (MEVACOR) 20 MG tablet  Patient-reported dosage and instructions? once a day in pm  How many days do you have left? 6  Preferred Pharmacy? [facility] phone number (if available)? [phone]  Additional Information for Provider? Patient will need this filled. She   was unaware that Geovany Macario was no longer with [address] [facility]. Will Dr. Chloe Fraser   be willing fill this for her?  ---------------------------------------------------------------------------  --------------  CALL BACK INFO  What is the best way for the office to contact you? OK to leave message on   voicemail  Preferred Call Back Phone Number? [phone]  ---------------------------------------------------------------------------  --------------  SCRIPT ANSWERS  Relationship to Patient?  Self

## 2023-03-08 NOTE — TELEPHONE ENCOUNTER
Patient's last appointment was : 9/13/2022  Patient's next appointment is :  7/10/2023  Last refilled: 9/1/2022 for 6 month supply   Pharmacy Verified    Lab Results   Component Value Date    LABA1C 5.3 12/17/2019     Lab Results   Component Value Date    CHOL 186 09/08/2021    TRIG 72 09/08/2021    HDL 70 09/14/2022    LDLCALC 107 09/14/2022     Lab Results   Component Value Date     09/14/2022    K 4.1 09/14/2022     09/14/2022    CO2 26 09/14/2022    BUN 18 09/14/2022    CREATININE 0.5 09/14/2022    GLUCOSE 103 09/14/2022    CALCIUM 9.4 09/14/2022    PROT 7.2 09/14/2022    LABALBU 4.7 09/14/2022    BILITOT 0.3 09/14/2022    ALKPHOS 99 09/14/2022    AST 18 09/14/2022    ALT 14 09/14/2022    LABGLOM >90 09/14/2022    AGRATIO 1.8 05/14/2016     Lab Results   Component Value Date    TSH 1.180 09/08/2021     Lab Results   Component Value Date    WBC 4.8 09/14/2022    HGB 15.0 09/14/2022    HCT 45.2 09/14/2022    MCV 87.4 09/14/2022     09/14/2022

## 2023-03-29 ENCOUNTER — OFFICE VISIT (OUTPATIENT)
Dept: PHYSICAL MEDICINE AND REHAB | Age: 63
End: 2023-03-29
Payer: MEDICAID

## 2023-03-29 VITALS
HEIGHT: 66 IN | DIASTOLIC BLOOD PRESSURE: 66 MMHG | SYSTOLIC BLOOD PRESSURE: 124 MMHG | WEIGHT: 113 LBS | BODY MASS INDEX: 18.16 KG/M2

## 2023-03-29 DIAGNOSIS — M79.18 MYOFASCIAL PAIN: Primary | ICD-10-CM

## 2023-03-29 DIAGNOSIS — M48.02 FORAMINAL STENOSIS OF CERVICAL REGION: ICD-10-CM

## 2023-03-29 DIAGNOSIS — G89.4 CHRONIC PAIN SYNDROME: ICD-10-CM

## 2023-03-29 DIAGNOSIS — M50.30 BULGE OF CERVICAL DISC WITHOUT MYELOPATHY: ICD-10-CM

## 2023-03-29 DIAGNOSIS — M47.812 CERVICAL SPONDYLOSIS: ICD-10-CM

## 2023-03-29 DIAGNOSIS — M47.812 FACET HYPERTROPHY OF CERVICAL REGION: ICD-10-CM

## 2023-03-29 PROCEDURE — G8419 CALC BMI OUT NRM PARAM NOF/U: HCPCS | Performed by: NURSE PRACTITIONER

## 2023-03-29 PROCEDURE — 20553 NJX 1/MLT TRIGGER POINTS 3/>: CPT | Performed by: NURSE PRACTITIONER

## 2023-03-29 PROCEDURE — 99213 OFFICE O/P EST LOW 20 MIN: CPT | Performed by: NURSE PRACTITIONER

## 2023-03-29 PROCEDURE — 3017F COLORECTAL CA SCREEN DOC REV: CPT | Performed by: NURSE PRACTITIONER

## 2023-03-29 PROCEDURE — G8427 DOCREV CUR MEDS BY ELIG CLIN: HCPCS | Performed by: NURSE PRACTITIONER

## 2023-03-29 PROCEDURE — 1036F TOBACCO NON-USER: CPT | Performed by: NURSE PRACTITIONER

## 2023-03-29 PROCEDURE — G8484 FLU IMMUNIZE NO ADMIN: HCPCS | Performed by: NURSE PRACTITIONER

## 2023-03-29 RX ORDER — TRIAMCINOLONE ACETONIDE 40 MG/ML
40 INJECTION, SUSPENSION INTRA-ARTICULAR; INTRAMUSCULAR ONCE
Status: COMPLETED | OUTPATIENT
Start: 2023-03-29 | End: 2023-03-29

## 2023-03-29 RX ADMIN — TRIAMCINOLONE ACETONIDE 40 MG: 40 INJECTION, SUSPENSION INTRA-ARTICULAR; INTRAMUSCULAR at 13:53

## 2023-03-29 NOTE — PROGRESS NOTES
Chronic Pain/PM&R Clinic Note     Encounter Date: 3/29/23    Subjective:   Chief Complaint:   Chief Complaint   Patient presents with    Follow-up     Pt. Reports pain returning in neck. History of Present Illness:   Caitlin Cm is a 63 y.o. female seen in the clinic initially on 10/05/2022 upon request from DHIRAJ Odell for her history of neck pain. PmHX of HLD. Patient complains of bilateral neck pain, that does not radiate into her arms. She states at times will go into the tops of her shoulders, and from her neck to the top of her head into the back of the eyes. She states pain has been occurring for 5 years. She reports that at that time she was crocheting, fell asleep with her chin on her chest, slightly bent for few hours and when she woke up had pain. She denies any accident, injury, fall at that time. She states the pain is debilitating at times, makes her feel off balance and nauseated when it worsens. She does state it is a constant ache. She reports that she has noticed certain positions or movements will make her have a headache and feel nauseated, such as standing on her knees, twisting, crossing her legs at times. She states she will also occasionally get blurred vision when this happens. She states she has massage in the past with great relief but only lasted 2 days. She also reports that if she pushes on a certain spot her pain will be alleviated somewhat. She notices pain is better in the summer worse when it is cold out. She reports she used to get some tingling weakness in her arms but has normalized 5 months. She reports she does feel very tight/stiff. She states she completed physical therapy 2 years ago with no change. She still continues to do her home exercises without relief. Today, 3/29/2023, patient presents for requested follow up. She reports that she noticed her neck pain and headaches were returning within the last week.  Still more tolerable

## 2023-05-10 ENCOUNTER — OFFICE VISIT (OUTPATIENT)
Dept: FAMILY MEDICINE CLINIC | Age: 63
End: 2023-05-10
Payer: MEDICAID

## 2023-05-10 VITALS
SYSTOLIC BLOOD PRESSURE: 122 MMHG | TEMPERATURE: 98.3 F | DIASTOLIC BLOOD PRESSURE: 74 MMHG | WEIGHT: 115.8 LBS | HEIGHT: 66 IN | BODY MASS INDEX: 18.61 KG/M2

## 2023-05-10 DIAGNOSIS — Z12.31 ENCOUNTER FOR SCREENING MAMMOGRAM FOR MALIGNANT NEOPLASM OF BREAST: ICD-10-CM

## 2023-05-10 DIAGNOSIS — Z00.00 WELL ADULT EXAM: Primary | ICD-10-CM

## 2023-05-10 DIAGNOSIS — Z79.899 ON STATIN THERAPY: ICD-10-CM

## 2023-05-10 DIAGNOSIS — E78.00 HYPERCHOLESTEROLEMIA: ICD-10-CM

## 2023-05-10 PROCEDURE — 99396 PREV VISIT EST AGE 40-64: CPT | Performed by: NURSE PRACTITIONER

## 2023-05-11 ASSESSMENT — ENCOUNTER SYMPTOMS
WHEEZING: 0
COUGH: 0
CHEST TIGHTNESS: 0
SHORTNESS OF BREATH: 0
BACK PAIN: 0
ABDOMINAL DISTENTION: 0
ABDOMINAL PAIN: 0

## 2023-05-11 NOTE — PROGRESS NOTES
[address] [facility] [address]  Dept: [phone]  Dept Fax: [phone]  Loc: [phone]  PROGRESS NOTE      VisitDate: 5/10/2023    Prosper Barrera is a 63 y.o. female who presents today for:     Chief Complaint   Patient presents with    Annual Exam     Patient here for yearly exam. No concerns, just annual check-up         Subjective:  Presents for annual wellness exam.  Without complaint or concern. History of hyperlipidemia migraines TMJ. Taking medications as prescribed. Denies any fever illness otalgia headaches dizziness syncope chest pain shortness of breath abdominal pain numbness or edema. Reports regular bowel movements. Denies any dysuria flank pain hematuria. Review of Systems   Constitutional:  Negative for activity change, appetite change, chills, fatigue and fever. Eyes:  Negative for visual disturbance. Respiratory:  Negative for cough, chest tightness, shortness of breath and wheezing. Cardiovascular:  Negative for chest pain, palpitations and leg swelling. Gastrointestinal:  Negative for abdominal distention and abdominal pain. Genitourinary:  Negative for dysuria. Musculoskeletal:  Negative for arthralgias, back pain and neck pain. Skin: Negative. Negative for rash. Neurological:  Negative for dizziness, light-headedness and headaches. Hematological:  Negative for adenopathy. Psychiatric/Behavioral:  Negative for decreased concentration and dysphoric mood. All other systems reviewed and are negative.   Past Medical History:   Diagnosis Date    Hyperlipidemia 12/2013    [facility]    Migraine 1998    TMJ (dislocation of temporomandibular joint) 10/12/2016    ED visit      Past Surgical History:   Procedure Laterality Date    APPENDECTOMY  1987    COLONOSCOPY      [facility] LOC BREAST BIOPSY LEFT Left 4/6/2021    [facility] LOC BREAST BIOPSY LEFT 4/6/2021 Timur Dumont

## 2023-05-13 ENCOUNTER — NURSE ONLY (OUTPATIENT)
Dept: LAB | Age: 63
End: 2023-05-13

## 2023-05-13 DIAGNOSIS — Z00.00 WELL ADULT EXAM: ICD-10-CM

## 2023-05-13 DIAGNOSIS — E78.00 HYPERCHOLESTEROLEMIA: ICD-10-CM

## 2023-05-13 LAB
ALBUMIN SERPL BCG-MCNC: 4.4 G/DL (ref 3.5–5.1)
ALP SERPL-CCNC: 87 U/L (ref 38–126)
ALT SERPL W/O P-5'-P-CCNC: 16 U/L (ref 11–66)
ANION GAP SERPL CALC-SCNC: 9 MEQ/L (ref 8–16)
AST SERPL-CCNC: 18 U/L (ref 5–40)
BILIRUB SERPL-MCNC: 0.4 MG/DL (ref 0.3–1.2)
BUN SERPL-MCNC: 14 MG/DL (ref 7–22)
CALCIUM SERPL-MCNC: 9.1 MG/DL (ref 8.5–10.5)
CHLORIDE SERPL-SCNC: 100 MEQ/L (ref 98–111)
CHOLEST SERPL-MCNC: 190 MG/DL (ref 100–199)
CO2 SERPL-SCNC: 29 MEQ/L (ref 23–33)
CREAT SERPL-MCNC: 0.4 MG/DL (ref 0.4–1.2)
GFR SERPL CREATININE-BSD FRML MDRD: > 60 ML/MIN/1.73M2
GLUCOSE SERPL-MCNC: 98 MG/DL (ref 70–108)
HDLC SERPL-MCNC: 84 MG/DL
LDLC SERPL CALC-MCNC: 95 MG/DL
POTASSIUM SERPL-SCNC: 3.8 MEQ/L (ref 3.5–5.2)
PROT SERPL-MCNC: 7 G/DL (ref 6.1–8)
SODIUM SERPL-SCNC: 138 MEQ/L (ref 135–145)
TRIGL SERPL-MCNC: 53 MG/DL (ref 0–199)

## 2023-08-24 ENCOUNTER — HOSPITAL ENCOUNTER (OUTPATIENT)
Dept: WOMENS IMAGING | Age: 63
Discharge: HOME | End: 2023-08-24
Payer: MEDICAID

## 2023-08-24 VITALS — WEIGHT: 115 LBS | HEIGHT: 65 IN | BODY MASS INDEX: 19.16 KG/M2

## 2023-08-24 DIAGNOSIS — Z12.31 ENCOUNTER FOR SCREENING MAMMOGRAM FOR MALIGNANT NEOPLASM OF BREAST: ICD-10-CM

## 2023-08-24 PROCEDURE — 77063 BREAST TOMOSYNTHESIS BI: CPT

## 2023-08-25 DIAGNOSIS — E78.00 HYPERCHOLESTEROLEMIA: ICD-10-CM

## 2023-08-25 RX ORDER — LOVASTATIN 20 MG/1
TABLET ORAL
Qty: 90 TABLET | Refills: 2 | Status: SHIPPED | OUTPATIENT
Start: 2023-08-25

## 2023-08-29 DIAGNOSIS — E78.00 HYPERCHOLESTEROLEMIA: ICD-10-CM

## 2023-08-29 RX ORDER — LOVASTATIN 20 MG/1
TABLET ORAL
Qty: 90 TABLET | Refills: 2 | OUTPATIENT
Start: 2023-08-29

## 2023-09-25 ENCOUNTER — TELEPHONE (OUTPATIENT)
Dept: FAMILY MEDICINE CLINIC | Age: 63
End: 2023-09-25

## 2023-09-25 DIAGNOSIS — Z01.419 PAP SMEAR, AS PART OF ROUTINE GYNECOLOGICAL EXAMINATION: Primary | ICD-10-CM

## 2023-09-25 NOTE — TELEPHONE ENCOUNTER
----- Message from Paresh Church sent at 9/25/2023 10:59 AM EDT -----  Subject: Message to Provider    QUESTIONS  Information for Provider? Spoke with patient, she is needing to know based   on the information she received in her mychart, if she is needing an   referral to be seen for a cervical cancer screening. She states that the   last pap she received was in 2017. Please return her call to address this   concerns, thank you  ---------------------------------------------------------------------------  --------------  [address]  [phone]; OK to leave message on voicemail  ---------------------------------------------------------------------------  --------------  SCRIPT ANSWERS  Relationship to Patient?  Self

## 2023-10-17 ENCOUNTER — OFFICE VISIT (OUTPATIENT)
Dept: FAMILY MEDICINE CLINIC | Age: 63
End: 2023-10-17
Payer: MEDICAID

## 2023-10-17 VITALS
OXYGEN SATURATION: 98 % | SYSTOLIC BLOOD PRESSURE: 118 MMHG | HEART RATE: 68 BPM | BODY MASS INDEX: 19.3 KG/M2 | WEIGHT: 116 LBS | DIASTOLIC BLOOD PRESSURE: 64 MMHG | TEMPERATURE: 98.5 F | RESPIRATION RATE: 16 BRPM

## 2023-10-17 DIAGNOSIS — R35.0 FREQUENCY OF URINATION: Primary | ICD-10-CM

## 2023-10-17 DIAGNOSIS — L82.1 SEBORRHEIC KERATOSIS: ICD-10-CM

## 2023-10-17 LAB
BILIRUBIN, POC: NORMAL
BLOOD URINE, POC: NORMAL
CLARITY, POC: CLEAR
COLOR, POC: YELLOW
GLUCOSE URINE, POC: NORMAL
KETONES, POC: NORMAL
LEUKOCYTE EST, POC: NORMAL
NITRITE, POC: NORMAL
PH, POC: 6.5
PROTEIN, POC: NORMAL
SPECIFIC GRAVITY, POC: 1.02
UROBILINOGEN, POC: 0.2

## 2023-10-17 PROCEDURE — 1036F TOBACCO NON-USER: CPT | Performed by: NURSE PRACTITIONER

## 2023-10-17 PROCEDURE — 81003 URINALYSIS AUTO W/O SCOPE: CPT | Performed by: NURSE PRACTITIONER

## 2023-10-17 PROCEDURE — 99214 OFFICE O/P EST MOD 30 MIN: CPT | Performed by: NURSE PRACTITIONER

## 2023-10-17 PROCEDURE — G8420 CALC BMI NORM PARAMETERS: HCPCS | Performed by: NURSE PRACTITIONER

## 2023-10-17 PROCEDURE — G8482 FLU IMMUNIZE ORDER/ADMIN: HCPCS | Performed by: NURSE PRACTITIONER

## 2023-10-17 PROCEDURE — 3017F COLORECTAL CA SCREEN DOC REV: CPT | Performed by: NURSE PRACTITIONER

## 2023-10-17 PROCEDURE — G8427 DOCREV CUR MEDS BY ELIG CLIN: HCPCS | Performed by: NURSE PRACTITIONER

## 2023-10-17 ASSESSMENT — ENCOUNTER SYMPTOMS
TROUBLE SWALLOWING: 0
EYE DISCHARGE: 0
COUGH: 0
WHEEZING: 0
CONSTIPATION: 0
NAUSEA: 0
VOMITING: 0
BACK PAIN: 0
SORE THROAT: 0
ABDOMINAL PAIN: 0
SHORTNESS OF BREATH: 0
EYE REDNESS: 0
RHINORRHEA: 0
ALLERGIC/IMMUNOLOGIC NEGATIVE: 1
EYE PAIN: 0
DIARRHEA: 0

## 2023-10-17 NOTE — PROGRESS NOTES
less than 2 seconds. Coloration: Skin is not pale. Findings: No erythema or rash. Comments: 5 to 7 mm seborrheic keratosis raised   Neurological:      Mental Status: She is alert and oriented to person, place, and time. Coordination: Coordination normal.   Psychiatric:         Behavior: Behavior normal.         Thought Content: Thought content normal.         Judgment: Judgment normal.         Assessment/Plan:      Thiago Webber was seen today for urinary tract infection and skin problem. Diagnoses and all orders for this visit:    Frequency of urination  -     POCT Urinalysis No Micro (Auto)    Seborrheic keratosis  -     AFL - Isabella Morton DO, Dermatology, [facility]    Urinalysis does not indicate UTI. Discussed with the patient she should slow her water intake down to see if the urinary frequency and urgency is persistent. Referred to dermatology for possible removal of the seborrheic keratosis. Return if symptoms worsen or fail to improve. Patient instructions given elizabeth.         Electronically signed by DHIRAJ Lehman CNP on 10/17/2023 at 3:23 PM

## 2023-11-04 ENCOUNTER — HOSPITAL ENCOUNTER (EMERGENCY)
Age: 63
Discharge: HOME | End: 2023-11-04
Attending: EMERGENCY MEDICINE
Payer: MEDICAID

## 2023-11-04 VITALS
SYSTOLIC BLOOD PRESSURE: 173 MMHG | TEMPERATURE: 98.1 F | BODY MASS INDEX: 19.16 KG/M2 | HEIGHT: 65 IN | OXYGEN SATURATION: 97 % | DIASTOLIC BLOOD PRESSURE: 72 MMHG | RESPIRATION RATE: 16 BRPM | HEART RATE: 84 BPM | WEIGHT: 115 LBS

## 2023-11-04 DIAGNOSIS — N32.81 OAB (OVERACTIVE BLADDER): Primary | ICD-10-CM

## 2023-11-04 LAB
ANION GAP SERPL CALC-SCNC: 8 MEQ/L (ref 8–16)
BASOPHILS ABSOLUTE: 0 THOU/MM3 (ref 0–0.1)
BASOPHILS NFR BLD AUTO: 1 %
BILIRUB UR QL STRIP.AUTO: NEGATIVE
BUN SERPL-MCNC: 21 MG/DL (ref 7–22)
CALCIUM SERPL-MCNC: 9.2 MG/DL (ref 8.5–10.5)
CHARACTER UR: CLEAR
CHLORIDE SERPL-SCNC: 103 MEQ/L (ref 98–111)
CO2 SERPL-SCNC: 29 MEQ/L (ref 23–33)
COLOR: YELLOW
CREAT SERPL-MCNC: 0.5 MG/DL (ref 0.4–1.2)
DEPRECATED RDW RBC AUTO: 41.1 FL (ref 35–45)
EOSINOPHIL NFR BLD AUTO: 4.4 %
EOSINOPHILS ABSOLUTE: 0.2 THOU/MM3 (ref 0–0.4)
ERYTHROCYTE [DISTWIDTH] IN BLOOD BY AUTOMATED COUNT: 12.6 % (ref 11.5–14.5)
GFR SERPL CREATININE-BSD FRML MDRD: > 60 ML/MIN/1.73M2
GLUCOSE SERPL-MCNC: 100 MG/DL (ref 70–108)
GLUCOSE UR QL STRIP.AUTO: NEGATIVE MG/DL
HCT VFR BLD AUTO: 41 % (ref 37–47)
HGB BLD-MCNC: 13.6 GM/DL (ref 12–16)
HGB UR QL STRIP.AUTO: NEGATIVE
IMM GRANULOCYTES # BLD AUTO: 0 THOU/MM3 (ref 0–0.07)
IMM GRANULOCYTES NFR BLD AUTO: 0 %
KETONES UR QL STRIP.AUTO: NEGATIVE
LYMPHOCYTES ABSOLUTE: 1.5 THOU/MM3 (ref 1–4.8)
LYMPHOCYTES NFR BLD AUTO: 36.9 %
MCH RBC QN AUTO: 29.6 PG (ref 26–33)
MCHC RBC AUTO-ENTMCNC: 33.2 GM/DL (ref 32.2–35.5)
MCV RBC AUTO: 89.1 FL (ref 81–99)
MONOCYTES ABSOLUTE: 0.5 THOU/MM3 (ref 0.4–1.3)
MONOCYTES NFR BLD AUTO: 11.7 %
NEUTROPHILS NFR BLD AUTO: 46 %
NITRITE UR QL STRIP: NEGATIVE
NRBC BLD AUTO-RTO: 0 /100 WBC
OSMOLALITY SERPL CALC.SUM OF ELEC: 282.5 MOSMOL/KG (ref 275–300)
PH UR STRIP.AUTO: 7.5 [PH] (ref 5–9)
PLATELET # BLD AUTO: 214 THOU/MM3 (ref 130–400)
PMV BLD AUTO: 10.1 FL (ref 9.4–12.4)
POTASSIUM SERPL-SCNC: 3.7 MEQ/L (ref 3.5–5.2)
PROT UR STRIP.AUTO-MCNC: NEGATIVE MG/DL
RBC # BLD AUTO: 4.6 MILL/MM3 (ref 4.2–5.4)
SEGMENTED NEUTROPHILS ABSOLUTE COUNT: 1.9 THOU/MM3 (ref 1.8–7.7)
SODIUM SERPL-SCNC: 140 MEQ/L (ref 135–145)
SP GR UR REFRACT.AUTO: 1.01 (ref 1–1.03)
UROBILINOGEN, URINE: 0.2 EU/DL (ref 0–1)
WBC # BLD AUTO: 4.1 THOU/MM3 (ref 4.8–10.8)
WBC #/AREA URNS HPF: NEGATIVE /[HPF]

## 2023-11-04 PROCEDURE — 80048 BASIC METABOLIC PNL TOTAL CA: CPT

## 2023-11-04 PROCEDURE — 85025 COMPLETE CBC W/AUTO DIFF WBC: CPT

## 2023-11-04 PROCEDURE — 36415 COLL VENOUS BLD VENIPUNCTURE: CPT

## 2023-11-04 PROCEDURE — 99283 EMERGENCY DEPT VISIT LOW MDM: CPT

## 2023-11-04 PROCEDURE — 81003 URINALYSIS AUTO W/O SCOPE: CPT

## 2023-11-04 ASSESSMENT — PAIN DESCRIPTION - DESCRIPTORS: DESCRIPTORS: PRESSURE

## 2023-11-04 ASSESSMENT — PAIN DESCRIPTION - LOCATION: LOCATION: ABDOMEN

## 2023-11-04 ASSESSMENT — PAIN - FUNCTIONAL ASSESSMENT: PAIN_FUNCTIONAL_ASSESSMENT: NONE - DENIES PAIN

## 2023-11-05 NOTE — ED TRIAGE NOTES
Pt from lobby with c/o pain and burning with urination and increase urinary frequency, feeling she has to urinate every 2 hours. Bladder scan reveals 16ml post void. Pt c/o pressure to lower abdomen. Pt denies flank pain. Pt had pelvic exam and urinalysis yesterday at [facility] [place].

## 2023-11-08 ENCOUNTER — OFFICE VISIT (OUTPATIENT)
Dept: UROLOGY | Age: 63
End: 2023-11-08

## 2023-11-08 VITALS — RESPIRATION RATE: 16 BRPM | WEIGHT: 115 LBS | HEIGHT: 65 IN | BODY MASS INDEX: 19.16 KG/M2

## 2023-11-08 DIAGNOSIS — R35.0 URINARY FREQUENCY: ICD-10-CM

## 2023-11-08 DIAGNOSIS — R35.0 FREQUENCY OF URINATION: Primary | ICD-10-CM

## 2023-11-08 DIAGNOSIS — N32.81 OVERACTIVE BLADDER: ICD-10-CM

## 2023-11-08 DIAGNOSIS — R39.15 URINARY URGENCY: ICD-10-CM

## 2023-11-08 LAB
BILIRUBIN URINE: NEGATIVE
BLOOD URINE, POC: NEGATIVE
CHARACTER, URINE: CLEAR
COLOR, URINE: YELLOW
GLUCOSE URINE: NEGATIVE MG/DL
KETONES, URINE: NEGATIVE
LEUKOCYTE CLUMPS, URINE: NEGATIVE
NITRITE, URINE: NEGATIVE
PH, URINE: 7 (ref 5–9)
POST VOID RESIDUAL (PVR): 55 ML
PROTEIN, URINE: NEGATIVE MG/DL
SPECIFIC GRAVITY, URINE: 1.01 (ref 1–1.03)
UROBILINOGEN, URINE: 0.2 EU/DL (ref 0–1)

## 2023-11-08 RX ORDER — OXYBUTYNIN CHLORIDE 10 MG/1
10 TABLET, EXTENDED RELEASE ORAL DAILY
Qty: 30 TABLET | Refills: 2 | Status: SHIPPED | OUTPATIENT
Start: 2023-11-08

## 2023-11-08 ASSESSMENT — ENCOUNTER SYMPTOMS
ABDOMINAL PAIN: 0
COUGH: 0
SHORTNESS OF BREATH: 0

## 2023-11-08 NOTE — PATIENT INSTRUCTIONS
Bladder irritants    For some people, certain foods may irritate the bladder, causing or making bladder symptoms worse. If symptoms are related to diet, avoiding highly acidic or spicy foods, alcohol, or carbonated drinks should bring relief after approximately 10 days. Once the symptoms have improved on a restricted diet, patients can gradually add these foods back into the diet. If one specific food does cause symptoms, it can be easily identified and avoided.      Foods that should be avoided:       Apples     Plums  Apple juice    Strawberries  [place]    Tea  Carbonated beverages   Tomatoes  Chilies/spicy foods   Vinegar  Chocolate    Vitamin B complex  Citrus fruits    Bananas  Coffee (including decaffeinated)  Saccharin sweetners (Sweet'n low)  Cranberries    Soy sauce  Grapes     Alcohol  Guava     Processed meat and fish  Nutrasweet    Tofu  Mayonnaise    Yogurt  Peaches    Pineapple

## 2023-11-08 NOTE — PROGRESS NOTES
[address] [place] [address],[address] [address]  Dept: [phone]  Loc: [phone]     Patient:  Jeevan Wood  YOB: 1960  Date: 11/8/2023    HISTORY OF PRESENT ILLNESS:   The patient is a 66 y.o. female who presents today for evaluation of the following problems: ED referral for urinary frequency     1. Frequency of urination    2. Overactive bladder    3. Urinary urgency    4. Urinary frequency       Pt states that approximately 3 weeks ago she began having urinary symptoms freq and urgency and mild suprapubic pain 1-2/10 and suspected UTI but went to PCP and UA negative. Symptoms persisted and she presented to ED 11-4-23 and had negative UA at that time and was given myrabetriq for OAB. PVR in ED was 17ml. She denies back pain. She was drinking 1 cup tea a day and has only had sips over past week or so. .  After discussion she did eat a large amount tomatoes 3 weeks ago which may have correlated with onset symptoms. Overall the problem(s) : are improving since starting myrabetriq 4 days prior and cutting back on tea 2 weeks ago. She was urinating every 30 minutes and now it is every 1 to 2 hours which is better but still bothersome and not her normal.   Associated Symptoms: No dysuria, gross hematuria. Pain Severity:  1-2 suprapubic pain    PVR 55ml today  UA negative. Was drinking 10 to 12 glasses water a day, now 8 glasses a day. Onset 3 weeks ago, sudden onset  Severity is described as mild. The course of symptoms over time is acute and sudden. Alleviating factors: lower pain improves when urinates and with myrabetriq  Worsening factors: tea.  Maybe tomatoes  Lower urinary tract symptoms: urgency, frequency, hesitancy, decreased urinary stream, and nocturia, 1 times per night, mild bloating    Family hx bladder cancer:no  Personal hx tobacco use:yes - former quit in 40 years ago, smoked for 3 years

## 2023-11-10 LAB
BACTERIA UR CULT: ABNORMAL
ORGANISM: ABNORMAL

## 2023-11-15 ENCOUNTER — OFFICE VISIT (OUTPATIENT)
Dept: FAMILY MEDICINE CLINIC | Age: 63
End: 2023-11-15
Payer: MEDICAID

## 2023-11-15 VITALS
TEMPERATURE: 98 F | BODY MASS INDEX: 18.97 KG/M2 | SYSTOLIC BLOOD PRESSURE: 118 MMHG | RESPIRATION RATE: 16 BRPM | DIASTOLIC BLOOD PRESSURE: 60 MMHG | WEIGHT: 114 LBS | HEART RATE: 72 BPM | OXYGEN SATURATION: 98 %

## 2023-11-15 DIAGNOSIS — N32.81 OAB (OVERACTIVE BLADDER): Primary | ICD-10-CM

## 2023-11-15 PROCEDURE — G8482 FLU IMMUNIZE ORDER/ADMIN: HCPCS | Performed by: NURSE PRACTITIONER

## 2023-11-15 PROCEDURE — 1036F TOBACCO NON-USER: CPT | Performed by: NURSE PRACTITIONER

## 2023-11-15 PROCEDURE — 3017F COLORECTAL CA SCREEN DOC REV: CPT | Performed by: NURSE PRACTITIONER

## 2023-11-15 PROCEDURE — 99213 OFFICE O/P EST LOW 20 MIN: CPT | Performed by: NURSE PRACTITIONER

## 2023-11-15 PROCEDURE — G8427 DOCREV CUR MEDS BY ELIG CLIN: HCPCS | Performed by: NURSE PRACTITIONER

## 2023-11-15 PROCEDURE — G8420 CALC BMI NORM PARAMETERS: HCPCS | Performed by: NURSE PRACTITIONER

## 2023-11-16 NOTE — PROGRESS NOTES
[address]  [address] [address]  Dept: [phone]  Dept Fax: [phone]  Loc: [phone]  PROGRESS NOTE      VisitDate: 11/15/2023    Hank Burnham is a 66 y.o. female who presents today for:     Chief Complaint   Patient presents with    ED Follow-up     [facility] 23, OAB, doing much better denies frequency and urgency, some bloating-urology changed med to ditropan         Subjective:  ED follow-up 2023 overactive bladder and dysuria. Reports that significant improvements of her symptoms. Initially was placed on Myrbetriq but then switched per urology to Ditropan. History of migraines hyperlipidemia.           Review of Systems  Past Medical History:   Diagnosis Date    Hyperlipidemia 2013    Serena Segura    Migraine     TMJ (dislocation of temporomandibular joint) 10/12/2016    ED visit      Past Surgical History:   Procedure Laterality Date    APPENDECTOMY      COLONOSCOPY      [facility] STEROTACTIC LOC BREAST BIOPSY LEFT Left 2021    [facility] STEROTACTIC LOC BREAST BIOPSY LEFT 2021 Tae Padron MD [address] [place] [address]    [place] [address] [place] [address] IND N/A 7/3/2018    COLONOSCOPY performed by Aleyda Fontanez MD at [facility]      at 13 yrs old     Family History   Problem Relation Age of Onset    Heart Attack Mother 59        due to medication    Other Brother         collitis    Heart Disease Brother     Heart Disease Brother      Social History     Tobacco Use    Smoking status: Former     Packs/day: 0.25     Years: 5.00     Additional pack years: 0.00     Total pack years: 1.25     Types: Cigarettes     Quit date: 10/12/1985     Years since quittin.1    Smokeless tobacco: Never   Substance Use Topics    Alcohol use: No      Current Outpatient Medications   Medication Sig Dispense Refill    oxybutynin (DITROPAN XL) 10 MG extended release tablet Take 1 tablet by mouth daily 30

## 2023-12-06 ENCOUNTER — OFFICE VISIT (OUTPATIENT)
Dept: UROLOGY | Age: 63
End: 2023-12-06
Payer: MEDICAID

## 2023-12-06 VITALS
HEIGHT: 65 IN | BODY MASS INDEX: 18.99 KG/M2 | WEIGHT: 114 LBS | SYSTOLIC BLOOD PRESSURE: 134 MMHG | DIASTOLIC BLOOD PRESSURE: 76 MMHG

## 2023-12-06 DIAGNOSIS — R10.2 SUPRAPUBIC PAIN: ICD-10-CM

## 2023-12-06 DIAGNOSIS — N32.81 OVERACTIVE BLADDER: ICD-10-CM

## 2023-12-06 DIAGNOSIS — R39.15 URINARY URGENCY: ICD-10-CM

## 2023-12-06 DIAGNOSIS — R35.0 FREQUENCY OF URINATION: Primary | ICD-10-CM

## 2023-12-06 LAB
BILIRUBIN URINE: NEGATIVE
BLOOD URINE, POC: NEGATIVE
CHARACTER, URINE: CLEAR
COLOR, URINE: YELLOW
GLUCOSE URINE: NEGATIVE MG/DL
KETONES, URINE: NEGATIVE
LEUKOCYTE CLUMPS, URINE: NEGATIVE
NITRITE, URINE: NEGATIVE
PH, URINE: 7 (ref 5–9)
POST VOID RESIDUAL (PVR): 0 ML
PROTEIN, URINE: NEGATIVE MG/DL
SPECIFIC GRAVITY, URINE: 1.02 (ref 1–1.03)
UROBILINOGEN, URINE: 0.2 EU/DL (ref 0–1)

## 2023-12-06 PROCEDURE — 99214 OFFICE O/P EST MOD 30 MIN: CPT | Performed by: NURSE PRACTITIONER

## 2023-12-06 PROCEDURE — G8420 CALC BMI NORM PARAMETERS: HCPCS | Performed by: NURSE PRACTITIONER

## 2023-12-06 PROCEDURE — G8482 FLU IMMUNIZE ORDER/ADMIN: HCPCS | Performed by: NURSE PRACTITIONER

## 2023-12-06 PROCEDURE — 51798 US URINE CAPACITY MEASURE: CPT | Performed by: NURSE PRACTITIONER

## 2023-12-06 PROCEDURE — 3017F COLORECTAL CA SCREEN DOC REV: CPT | Performed by: NURSE PRACTITIONER

## 2023-12-06 PROCEDURE — G8427 DOCREV CUR MEDS BY ELIG CLIN: HCPCS | Performed by: NURSE PRACTITIONER

## 2023-12-06 PROCEDURE — 1036F TOBACCO NON-USER: CPT | Performed by: NURSE PRACTITIONER

## 2023-12-06 PROCEDURE — 81003 URINALYSIS AUTO W/O SCOPE: CPT | Performed by: NURSE PRACTITIONER

## 2023-12-06 ASSESSMENT — ENCOUNTER SYMPTOMS
ABDOMINAL PAIN: 0
COUGH: 0
SHORTNESS OF BREATH: 0

## 2023-12-06 NOTE — PROGRESS NOTES
[address]  [address] [address]  Dept: [phone]  Loc: [phone]    Visit Date: 12/6/2023        HPI:   HISTORY OF PRESENT ILLNESS:   The patient is a 66 y.o. female who presents today for follow up of OAB with frequency and urgency. She was seen as ER consult on 11/8/23 for urinary frequency. Approximately 3 weeks prior she began having urinary symptoms freq and urgency and mild suprapubic pain 1-2/10 and suspected UTI but went to PCP and UA negative. Symptoms persisted and she presented to ED 11-4-23 and had negative UA at that time and was given myrabetriq for OAB. PVR in ED was 17ml. She denied back pain. She was drinking 1 cup tea a day and has only had sips over past week or so. After discussion she did eat a large amount tomatoes 3 weeks prior which may have correlated with onset symptoms. Symptoms were improving with myrabetriq started 4 days prior in Er but medication was expensive and changed to oxybutynin 10 mg daily at that time. She was still urinating every 30 minutes and it had improved to every 1 to 2 hours. PVR 11-8-23 55ml and negative UA. Was drinking 10 to 12 glasses water a day, now 8 glasses a day. 12-6-23 seen today  UA today 12-6-23 negative. PVR today 12-6-23 0ml. Stopped oxybutynin after 3 weeks about 10 days ago due to dizziness and elevated BP. And Dry mouth. However symptoms are doing well off this as well. Quit drinking tea, none since last seen. Avoiding triggers  Frequency and urgency is completely resolved. Denies dysuria, hematuria. Urinates about every 3 to 4 hours. Sleeps 7 hours before wakes to urinate  Denies constipation, Bm 1/day. Soft, without straining. Suprapubic pain, mild int. Worse with crossing legs and sitting leaning forward. However this is gradually improving.   Saw OB/GYN in Nov 2023, they did pelvic exam and was normal.       Uro Hx  Family hx

## 2024-02-05 DIAGNOSIS — E78.00 HYPERCHOLESTEROLEMIA: ICD-10-CM

## 2024-02-07 RX ORDER — LOVASTATIN 20 MG/1
TABLET ORAL
Qty: 90 TABLET | Refills: 2 | OUTPATIENT
Start: 2024-02-07

## 2024-02-14 ENCOUNTER — OFFICE VISIT (OUTPATIENT)
Dept: FAMILY MEDICINE CLINIC | Age: 64
End: 2024-02-14
Payer: MEDICAID

## 2024-02-14 ENCOUNTER — NURSE ONLY (OUTPATIENT)
Dept: LAB | Age: 64
End: 2024-02-14

## 2024-02-14 VITALS
RESPIRATION RATE: 16 BRPM | SYSTOLIC BLOOD PRESSURE: 130 MMHG | DIASTOLIC BLOOD PRESSURE: 68 MMHG | OXYGEN SATURATION: 99 % | HEART RATE: 69 BPM | TEMPERATURE: 98 F | HEIGHT: 65 IN | WEIGHT: 114 LBS | BODY MASS INDEX: 18.99 KG/M2

## 2024-02-14 DIAGNOSIS — Z00.00 ENCOUNTER FOR WELL ADULT EXAM WITHOUT ABNORMAL FINDINGS: ICD-10-CM

## 2024-02-14 DIAGNOSIS — Z00.00 ENCOUNTER FOR WELL ADULT EXAM WITHOUT ABNORMAL FINDINGS: Primary | ICD-10-CM

## 2024-02-14 PROCEDURE — G8482 FLU IMMUNIZE ORDER/ADMIN: HCPCS | Performed by: NURSE PRACTITIONER

## 2024-02-14 PROCEDURE — 99396 PREV VISIT EST AGE 40-64: CPT | Performed by: NURSE PRACTITIONER

## 2024-02-14 NOTE — PROGRESS NOTES
Vaccine  Aged Out         Objective:     Physical Exam  Vitals and nursing note reviewed.   Constitutional:       Appearance: Normal appearance. She is well-developed and normal weight.   HENT:      Head: Normocephalic.      Right Ear: Tympanic membrane and ear canal normal.      Left Ear: Tympanic membrane and ear canal normal.      Nose: Nose normal.      Mouth/Throat:      Pharynx: Oropharynx is clear.   Eyes:      Extraocular Movements: Extraocular movements intact.      Conjunctiva/sclera: Conjunctivae normal.   Cardiovascular:      Rate and Rhythm: Normal rate and regular rhythm.      Pulses: Normal pulses.      Heart sounds: Normal heart sounds.   Pulmonary:      Effort: Pulmonary effort is normal.      Breath sounds: Normal breath sounds.   Abdominal:      General: Bowel sounds are normal.      Palpations: Abdomen is soft.   Musculoskeletal:         General: Normal range of motion.      Cervical back: Neck supple.   Skin:     General: Skin is warm and dry.   Neurological:      General: No focal deficit present.      Mental Status: She is alert and oriented to person, place, and time.   Psychiatric:         Mood and Affect: Mood normal.         Thought Content: Thought content normal.         Judgment: Judgment normal.       /68 (Site: Right Upper Arm)   Pulse 69   Temp 98 °F (36.7 °C) (Oral)   Resp 16   Ht 1.651 m (5' 5\")   Wt 51.7 kg (114 lb)   SpO2 99%   BMI 18.97 kg/m²       Impression/Plan:  1. Encounter for well adult exam without abnormal findings      Requested Prescriptions      No prescriptions requested or ordered in this encounter     Orders Placed This Encounter   Procedures    Rubella Antibody, IgG     Standing Status:   Future     Standing Expiration Date:   2/14/2025    Mumps Antibody, IgG     Standing Status:   Future     Standing Expiration Date:   2/14/2025     Seen today for wellness visit.  Discussed the importance of a healthy life style.  Balanced diet, nutrition, physical

## 2024-02-16 LAB — MUV IGG TITR SER: 1.29 {TITER}

## 2024-02-17 LAB — RUBV IGG SER-ACNC: 8.3 IU/ML

## 2024-02-19 ENCOUNTER — TELEPHONE (OUTPATIENT)
Dept: FAMILY MEDICINE CLINIC | Age: 64
End: 2024-02-19

## 2024-02-19 NOTE — TELEPHONE ENCOUNTER
----- Message from DHIRAJ Garcia CNP sent at 2/19/2024  8:26 AM EST -----  Rubella titer indicates lack of immunity.  Recommend MMR at health department.  Mumps immunity detected.

## 2024-03-28 ENCOUNTER — OFFICE VISIT (OUTPATIENT)
Dept: FAMILY MEDICINE CLINIC | Age: 64
End: 2024-03-28
Payer: MEDICAID

## 2024-03-28 VITALS
DIASTOLIC BLOOD PRESSURE: 72 MMHG | TEMPERATURE: 98.4 F | BODY MASS INDEX: 19.07 KG/M2 | WEIGHT: 114.6 LBS | SYSTOLIC BLOOD PRESSURE: 128 MMHG | OXYGEN SATURATION: 99 % | HEART RATE: 62 BPM

## 2024-03-28 DIAGNOSIS — R35.0 FREQUENCY OF URINATION: ICD-10-CM

## 2024-03-28 DIAGNOSIS — R10.30 LOWER ABDOMINAL PAIN: Primary | ICD-10-CM

## 2024-03-28 DIAGNOSIS — S00.33XA CONTUSION OF NOSE, INITIAL ENCOUNTER: ICD-10-CM

## 2024-03-28 LAB
BILIRUBIN, POC: NORMAL
BLOOD URINE, POC: NORMAL
CLARITY, POC: CLEAR
COLOR, POC: NORMAL
GLUCOSE URINE, POC: NORMAL
KETONES, POC: NORMAL
LEUKOCYTE EST, POC: NORMAL
NITRITE, POC: NORMAL
PH, POC: 6.5
PROTEIN, POC: NORMAL
SPECIFIC GRAVITY, POC: 1.01
UROBILINOGEN, POC: 0.2

## 2024-03-28 PROCEDURE — 3017F COLORECTAL CA SCREEN DOC REV: CPT | Performed by: NURSE PRACTITIONER

## 2024-03-28 PROCEDURE — G8420 CALC BMI NORM PARAMETERS: HCPCS | Performed by: NURSE PRACTITIONER

## 2024-03-28 PROCEDURE — 81003 URINALYSIS AUTO W/O SCOPE: CPT | Performed by: NURSE PRACTITIONER

## 2024-03-28 PROCEDURE — 99214 OFFICE O/P EST MOD 30 MIN: CPT | Performed by: NURSE PRACTITIONER

## 2024-03-28 PROCEDURE — 1036F TOBACCO NON-USER: CPT | Performed by: NURSE PRACTITIONER

## 2024-03-28 PROCEDURE — G8482 FLU IMMUNIZE ORDER/ADMIN: HCPCS | Performed by: NURSE PRACTITIONER

## 2024-03-28 PROCEDURE — G8427 DOCREV CUR MEDS BY ELIG CLIN: HCPCS | Performed by: NURSE PRACTITIONER

## 2024-03-28 ASSESSMENT — ENCOUNTER SYMPTOMS
ABDOMINAL PAIN: 1
ABDOMINAL DISTENTION: 0
BLOOD IN STOOL: 0
CONSTIPATION: 0
NAUSEA: 0
BACK PAIN: 0
WHEEZING: 0
COUGH: 0
VOMITING: 0
SHORTNESS OF BREATH: 0
CHEST TIGHTNESS: 0

## 2024-03-28 NOTE — PROGRESS NOTES
Allergen Reactions    Amoxicillin Shortness Of Breath, Swelling and Rash     Health Maintenance   Topic Date Due    COVID-19 Vaccine (1) Never done    Cervical cancer screen  08/01/2022    Respiratory Syncytial Virus (RSV) Pregnant or age 60 yrs+ (1 - 1-dose 60+ series) Never done    Lipids  05/13/2024    Depression Screen  02/14/2025    DTaP/Tdap/Td vaccine (2 - Td or Tdap) 04/28/2025    Breast cancer screen  08/24/2025    Colorectal Cancer Screen  07/03/2028    Flu vaccine  Completed    Shingles vaccine  Completed    Hepatitis C screen  Completed    HIV screen  Completed    Hepatitis A vaccine  Aged Out    Hepatitis B vaccine  Aged Out    Hib vaccine  Aged Out    Polio vaccine  Aged Out    Meningococcal (ACWY) vaccine  Aged Out    Pneumococcal 0-64 years Vaccine  Aged Out         Objective:     Physical Exam  Vitals and nursing note reviewed.   Constitutional:       Appearance: Normal appearance. She is well-developed and normal weight.   HENT:      Head: Normocephalic.      Right Ear: Tympanic membrane and ear canal normal.      Left Ear: Tympanic membrane and ear canal normal.      Nose: Nose normal.        Comments: Area of ecchymosis noted to the bridge of nose.  No obvious deformity.     Mouth/Throat:      Pharynx: Oropharynx is clear.   Eyes:      Extraocular Movements: Extraocular movements intact.      Conjunctiva/sclera: Conjunctivae normal.   Cardiovascular:      Rate and Rhythm: Normal rate and regular rhythm.      Pulses: Normal pulses.      Heart sounds: Normal heart sounds.   Pulmonary:      Effort: Pulmonary effort is normal.      Breath sounds: Normal breath sounds.   Abdominal:      General: Bowel sounds are normal.      Palpations: Abdomen is soft.      Tenderness: There is abdominal tenderness in the right lower quadrant, suprapubic area and left lower quadrant.       Musculoskeletal:         General: Normal range of motion.      Cervical back: Neck supple.   Skin:     General: Skin is warm

## 2024-03-29 ENCOUNTER — NURSE ONLY (OUTPATIENT)
Dept: LAB | Age: 64
End: 2024-03-29

## 2024-03-29 DIAGNOSIS — R10.30 LOWER ABDOMINAL PAIN: ICD-10-CM

## 2024-03-29 LAB
ALBUMIN SERPL BCG-MCNC: 4.7 G/DL (ref 3.5–5.1)
ALP SERPL-CCNC: 102 U/L (ref 38–126)
ALT SERPL W/O P-5'-P-CCNC: 17 U/L (ref 11–66)
ANION GAP SERPL CALC-SCNC: 8 MEQ/L (ref 8–16)
AST SERPL-CCNC: 20 U/L (ref 5–40)
BASOPHILS ABSOLUTE: 0.1 THOU/MM3 (ref 0–0.1)
BASOPHILS NFR BLD AUTO: 1 %
BILIRUB SERPL-MCNC: 0.4 MG/DL (ref 0.3–1.2)
BUN SERPL-MCNC: 14 MG/DL (ref 7–22)
CALCIUM SERPL-MCNC: 9.3 MG/DL (ref 8.5–10.5)
CHLORIDE SERPL-SCNC: 102 MEQ/L (ref 98–111)
CO2 SERPL-SCNC: 30 MEQ/L (ref 23–33)
CREAT SERPL-MCNC: 0.5 MG/DL (ref 0.4–1.2)
DEPRECATED RDW RBC AUTO: 42.1 FL (ref 35–45)
EOSINOPHIL NFR BLD AUTO: 2.7 %
EOSINOPHILS ABSOLUTE: 0.1 THOU/MM3 (ref 0–0.4)
ERYTHROCYTE [DISTWIDTH] IN BLOOD BY AUTOMATED COUNT: 12.8 % (ref 11.5–14.5)
GFR SERPL CREATININE-BSD FRML MDRD: > 90 ML/MIN/1.73M2
GLUCOSE SERPL-MCNC: 97 MG/DL (ref 70–108)
HCT VFR BLD AUTO: 45.1 % (ref 37–47)
HGB BLD-MCNC: 14.8 GM/DL (ref 12–16)
IMM GRANULOCYTES # BLD AUTO: 0.01 THOU/MM3 (ref 0–0.07)
IMM GRANULOCYTES NFR BLD AUTO: 0.2 %
LYMPHOCYTES ABSOLUTE: 1.4 THOU/MM3 (ref 1–4.8)
LYMPHOCYTES NFR BLD AUTO: 28.4 %
MCH RBC QN AUTO: 29.4 PG (ref 26–33)
MCHC RBC AUTO-ENTMCNC: 32.8 GM/DL (ref 32.2–35.5)
MCV RBC AUTO: 89.5 FL (ref 81–99)
MONOCYTES ABSOLUTE: 0.4 THOU/MM3 (ref 0.4–1.3)
MONOCYTES NFR BLD AUTO: 8.4 %
NEUTROPHILS NFR BLD AUTO: 59.3 %
NRBC BLD AUTO-RTO: 0 /100 WBC
PLATELET # BLD AUTO: 260 THOU/MM3 (ref 130–400)
PMV BLD AUTO: 10.1 FL (ref 9.4–12.4)
POTASSIUM SERPL-SCNC: 4.3 MEQ/L (ref 3.5–5.2)
PROT SERPL-MCNC: 7.4 G/DL (ref 6.1–8)
RBC # BLD AUTO: 5.04 MILL/MM3 (ref 4.2–5.4)
SEGMENTED NEUTROPHILS ABSOLUTE COUNT: 3 THOU/MM3 (ref 1.8–7.7)
SODIUM SERPL-SCNC: 140 MEQ/L (ref 135–145)
WBC # BLD AUTO: 5.1 THOU/MM3 (ref 4.8–10.8)

## 2024-04-22 ENCOUNTER — HOSPITAL ENCOUNTER (OUTPATIENT)
Dept: CT IMAGING | Age: 64
Discharge: HOME | End: 2024-04-22
Payer: MEDICAID

## 2024-04-22 DIAGNOSIS — R10.30 LOWER ABDOMINAL PAIN: ICD-10-CM

## 2024-04-22 PROCEDURE — 6360000004 HC RX CONTRAST MEDICATION: Performed by: NURSE PRACTITIONER

## 2024-04-22 PROCEDURE — 74177 CT ABD & PELVIS W/CONTRAST: CPT

## 2024-04-22 RX ADMIN — IOPAMIDOL 80 ML: 755 INJECTION, SOLUTION INTRAVENOUS at 07:36

## 2024-06-02 DIAGNOSIS — E78.00 HYPERCHOLESTEROLEMIA: ICD-10-CM

## 2024-06-03 RX ORDER — LOVASTATIN 20 MG/1
TABLET ORAL
Qty: 90 TABLET | Refills: 2 | Status: SHIPPED | OUTPATIENT
Start: 2024-06-03

## 2024-06-03 RX ORDER — LOVASTATIN 20 MG/1
TABLET ORAL
Qty: 90 TABLET | Refills: 0 | Status: SHIPPED | OUTPATIENT
Start: 2024-06-03

## 2024-08-27 DIAGNOSIS — E78.00 HYPERCHOLESTEROLEMIA: ICD-10-CM

## 2024-08-28 ENCOUNTER — OFFICE VISIT (OUTPATIENT)
Dept: FAMILY MEDICINE CLINIC | Age: 64
End: 2024-08-28
Payer: MEDICAID

## 2024-08-28 VITALS
SYSTOLIC BLOOD PRESSURE: 120 MMHG | RESPIRATION RATE: 16 BRPM | DIASTOLIC BLOOD PRESSURE: 66 MMHG | BODY MASS INDEX: 18.64 KG/M2 | TEMPERATURE: 98.3 F | OXYGEN SATURATION: 98 % | WEIGHT: 112 LBS | HEART RATE: 64 BPM

## 2024-08-28 DIAGNOSIS — Z00.00 WELL ADULT EXAM: Primary | ICD-10-CM

## 2024-08-28 DIAGNOSIS — Z12.31 ENCOUNTER FOR SCREENING MAMMOGRAM FOR MALIGNANT NEOPLASM OF BREAST: ICD-10-CM

## 2024-08-28 DIAGNOSIS — E78.00 HYPERCHOLESTEROLEMIA: ICD-10-CM

## 2024-08-28 DIAGNOSIS — E78.2 MIXED HYPERLIPIDEMIA: ICD-10-CM

## 2024-08-28 PROCEDURE — 99396 PREV VISIT EST AGE 40-64: CPT | Performed by: NURSE PRACTITIONER

## 2024-08-28 RX ORDER — LOVASTATIN 20 MG
TABLET ORAL
Qty: 90 TABLET | Refills: 2 | Status: SHIPPED | OUTPATIENT
Start: 2024-08-28

## 2024-08-28 SDOH — ECONOMIC STABILITY: INCOME INSECURITY: HOW HARD IS IT FOR YOU TO PAY FOR THE VERY BASICS LIKE FOOD, HOUSING, MEDICAL CARE, AND HEATING?: NOT HARD AT ALL

## 2024-08-28 SDOH — ECONOMIC STABILITY: FOOD INSECURITY: WITHIN THE PAST 12 MONTHS, YOU WORRIED THAT YOUR FOOD WOULD RUN OUT BEFORE YOU GOT MONEY TO BUY MORE.: NEVER TRUE

## 2024-08-28 SDOH — ECONOMIC STABILITY: FOOD INSECURITY: WITHIN THE PAST 12 MONTHS, THE FOOD YOU BOUGHT JUST DIDN'T LAST AND YOU DIDN'T HAVE MONEY TO GET MORE.: NEVER TRUE

## 2024-08-28 ASSESSMENT — ENCOUNTER SYMPTOMS
COUGH: 0
CHEST TIGHTNESS: 0
WHEEZING: 0
ABDOMINAL PAIN: 0
SHORTNESS OF BREATH: 0
BACK PAIN: 0
ABDOMINAL DISTENTION: 0

## 2024-08-28 NOTE — PROGRESS NOTES
[facility] PROFESSIONAL SERVS  [facility]  [address] [address]  Dept: [phone]  Dept Fax: [phone]  Loc: [phone]  PROGRESS NOTE      VisitDate: 8/28/2024    Dakota Yan is a 63 y.o. female who presents today for:     Chief Complaint   Patient presents with    Annual Exam         Subjective:  Annual wellness exam.  Patient without complaint or concern.  History of hyperlipidemia.  Denies any headache dizziness chest pain shortness of breath abdominal pain numbness rash edema.  Regular bowel movements no blood or melena.  Denies any dysuria flank pain hematuria.  Taking medications as prescribed          Review of Systems   Constitutional:  Negative for activity change, appetite change, chills, fatigue and fever.   Eyes:  Negative for visual disturbance.   Respiratory:  Negative for cough, chest tightness, shortness of breath and wheezing.    Cardiovascular:  Negative for chest pain, palpitations and leg swelling.   Gastrointestinal:  Negative for abdominal distention and abdominal pain.   Genitourinary:  Negative for dysuria.   Musculoskeletal:  Negative for arthralgias, back pain and neck pain.   Skin: Negative.  Negative for rash.   Neurological:  Negative for dizziness, light-headedness and headaches.   Hematological:  Negative for adenopathy.   Psychiatric/Behavioral:  Negative for decreased concentration and dysphoric mood.    All other systems reviewed and are negative.    Past Medical History:   Diagnosis Date    Hyperlipidemia 12/2013    Deb Sharp    Migraine 1998    TMJ (dislocation of temporomandibular joint) 10/12/2016    ED visit      Past Surgical History:   Procedure Laterality Date    APPENDECTOMY  1987    COLONOSCOPY      [facility] STEROT[facility] LOC BREAST BIOPSY LEFT Left 4/6/2021    [facility] STEROTACTIC LOC BREAST BIOPSY LEFT 4/6/2021 Candace Chang MD [facility]    [place] COLON CA SCRN NOT HI [place] IND N/A 7/3/2018    COLONOSCOPY performed  Polio vaccine  Aged Out    Meningococcal (ACWY) vaccine  Aged Out    Pneumococcal 0-64 years Vaccine  Aged Out         Objective:  Assessment & Plan   Physical Exam  Vitals and nursing note reviewed.   Constitutional:       Appearance: Normal appearance. She is well-developed and normal weight.   HENT:      Head: Normocephalic.      Right Ear: Tympanic membrane and ear canal normal.      Left Ear: Tympanic membrane and ear canal normal.      Nose: Nose normal.      Mouth/Throat:      Pharynx: Oropharynx is clear.   Eyes:      Extraocular Movements: Extraocular movements intact.      Conjunctiva/sclera: Conjunctivae normal.   Cardiovascular:      Rate and Rhythm: Normal rate and regular rhythm.      Pulses: Normal pulses.      Heart sounds: Normal heart sounds.   Pulmonary:      Effort: Pulmonary effort is normal.      Breath sounds: Normal breath sounds.   Abdominal:      General: Bowel sounds are normal.      Palpations: Abdomen is soft.   Musculoskeletal:         General: Normal range of motion.      Cervical back: Neck supple.   Skin:     General: Skin is warm and dry.   Neurological:      General: No focal deficit present.      Mental Status: She is alert and oriented to person, place, and time.   Psychiatric:         Mood and Affect: Mood normal.         Thought Content: Thought content normal.         Judgment: Judgment normal.       /66   Pulse 64   Temp 98.3 °F (36.8 °C) (Oral)   Resp 16   Wt 50.8 kg (112 lb)   SpO2 98%   BMI 18.64 kg/m²       Impression/Plan:  1. Well adult exam    2. Hypercholesterolemia    3. Mixed hyperlipidemia    4. Encounter for screening mammogram for malignant neoplasm of breast      Requested Prescriptions      No prescriptions requested or ordered in this encounter     Orders Placed This Encounter   Procedures    [facility] DIGITAL SCREEN W OR WO CAD BILATERAL     Standing Status:   Future     Standing Expiration Date:   8/28/2025    Comprehensive Metabolic Panel     Standing

## 2024-09-04 ENCOUNTER — LAB (OUTPATIENT)
Dept: LAB | Age: 64
End: 2024-09-04

## 2024-09-04 DIAGNOSIS — E78.2 MIXED HYPERLIPIDEMIA: ICD-10-CM

## 2024-09-04 LAB
ALBUMIN SERPL BCG-MCNC: 4.6 G/DL (ref 3.5–5.1)
ALP SERPL-CCNC: 98 U/L (ref 38–126)
ALT SERPL W/O P-5'-P-CCNC: 14 U/L (ref 11–66)
ANION GAP SERPL CALC-SCNC: 12 MEQ/L (ref 8–16)
AST SERPL-CCNC: 18 U/L (ref 5–40)
BILIRUB SERPL-MCNC: 0.4 MG/DL (ref 0.3–1.2)
BUN SERPL-MCNC: 16 MG/DL (ref 7–22)
CALCIUM SERPL-MCNC: 9.3 MG/DL (ref 8.5–10.5)
CHLORIDE SERPL-SCNC: 102 MEQ/L (ref 98–111)
CHOLEST SERPL-MCNC: 187 MG/DL (ref 100–199)
CO2 SERPL-SCNC: 28 MEQ/L (ref 23–33)
CREAT SERPL-MCNC: 0.5 MG/DL (ref 0.4–1.2)
GFR SERPL CREATININE-BSD FRML MDRD: > 90 ML/MIN/1.73M2
GLUCOSE SERPL-MCNC: 99 MG/DL (ref 70–108)
HDLC SERPL-MCNC: 79 MG/DL
LDLC SERPL CALC-MCNC: 98 MG/DL
POTASSIUM SERPL-SCNC: 4.5 MEQ/L (ref 3.5–5.2)
PROT SERPL-MCNC: 7.3 G/DL (ref 6.1–8)
SODIUM SERPL-SCNC: 142 MEQ/L (ref 135–145)
TRIGL SERPL-MCNC: 51 MG/DL (ref 0–199)

## 2024-09-23 ENCOUNTER — HOSPITAL ENCOUNTER (OUTPATIENT)
Dept: MAMMOGRAPHY | Age: 64
Discharge: HOME | End: 2024-09-23
Payer: MEDICAID

## 2024-09-23 DIAGNOSIS — Z12.31 ENCOUNTER FOR SCREENING MAMMOGRAM FOR MALIGNANT NEOPLASM OF BREAST: ICD-10-CM

## 2024-09-23 PROCEDURE — 77063 BREAST TOMOSYNTHESIS BI: CPT

## 2024-09-27 ENCOUNTER — OFFICE VISIT (OUTPATIENT)
Dept: FAMILY MEDICINE CLINIC | Age: 64
End: 2024-09-27
Payer: MEDICAID

## 2024-09-27 VITALS
SYSTOLIC BLOOD PRESSURE: 136 MMHG | WEIGHT: 114 LBS | BODY MASS INDEX: 17.89 KG/M2 | HEIGHT: 67 IN | DIASTOLIC BLOOD PRESSURE: 76 MMHG | HEART RATE: 88 BPM | RESPIRATION RATE: 24 BRPM

## 2024-09-27 DIAGNOSIS — R42 VERTIGO: Primary | ICD-10-CM

## 2024-09-27 PROCEDURE — 99213 OFFICE O/P EST LOW 20 MIN: CPT | Performed by: NURSE PRACTITIONER

## 2024-09-27 RX ORDER — AZITHROMYCIN 250 MG/1
TABLET, FILM COATED ORAL
Qty: 6 TABLET | Refills: 0 | Status: SHIPPED | OUTPATIENT
Start: 2024-09-27 | End: 2024-10-07

## 2024-09-27 RX ORDER — MECLIZINE HCL 12.5 MG 12.5 MG/1
12.5 TABLET ORAL 3 TIMES DAILY PRN
Qty: 60 TABLET | Refills: 0 | Status: SHIPPED | OUTPATIENT
Start: 2024-09-27 | End: 2024-10-27

## 2024-09-27 RX ORDER — PREDNISONE 10 MG/1
10 TABLET ORAL 2 TIMES DAILY
Qty: 10 TABLET | Refills: 0 | Status: SHIPPED | OUTPATIENT
Start: 2024-09-27 | End: 2024-10-02

## 2024-09-27 ASSESSMENT — ENCOUNTER SYMPTOMS
WHEEZING: 0
BACK PAIN: 0
ABDOMINAL DISTENTION: 0
COUGH: 0
SHORTNESS OF BREATH: 0
ABDOMINAL PAIN: 0
CHEST TIGHTNESS: 0

## 2024-10-03 ENCOUNTER — OFFICE VISIT (OUTPATIENT)
Dept: FAMILY MEDICINE CLINIC | Age: 64
End: 2024-10-03

## 2024-10-03 VITALS
DIASTOLIC BLOOD PRESSURE: 68 MMHG | SYSTOLIC BLOOD PRESSURE: 128 MMHG | BODY MASS INDEX: 18.32 KG/M2 | OXYGEN SATURATION: 99 % | TEMPERATURE: 98.3 F | WEIGHT: 115.2 LBS | HEART RATE: 70 BPM

## 2024-10-03 DIAGNOSIS — H69.91 ETD (EUSTACHIAN TUBE DYSFUNCTION), RIGHT: ICD-10-CM

## 2024-10-03 DIAGNOSIS — M62.838 MUSCLE SPASMS OF NECK: ICD-10-CM

## 2024-10-03 DIAGNOSIS — H61.21 IMPACTED CERUMEN OF RIGHT EAR: ICD-10-CM

## 2024-10-03 DIAGNOSIS — R42 DIZZINESS: Primary | ICD-10-CM

## 2024-10-03 RX ORDER — PSEUDOEPHEDRINE HCL 30 MG
30 TABLET ORAL EVERY 6 HOURS PRN
Qty: 20 TABLET | Refills: 0 | Status: SHIPPED | OUTPATIENT
Start: 2024-10-03

## 2024-10-03 RX ORDER — CYCLOBENZAPRINE HCL 10 MG
10 TABLET ORAL 3 TIMES DAILY PRN
Qty: 15 TABLET | Refills: 0 | Status: SHIPPED | OUTPATIENT
Start: 2024-10-03 | End: 2024-10-08

## 2024-10-03 RX ORDER — MECLIZINE HCL 12.5 MG 12.5 MG/1
12.5 TABLET ORAL 3 TIMES DAILY PRN
Qty: 60 TABLET | Refills: 0 | Status: SHIPPED | OUTPATIENT
Start: 2024-10-03 | End: 2024-11-02

## 2024-10-03 RX ORDER — MECLIZINE HYDROCHLORIDE 25 MG/1
25 TABLET ORAL 3 TIMES DAILY PRN
Qty: 30 TABLET | Refills: 0 | Status: SHIPPED | OUTPATIENT
Start: 2024-10-03 | End: 2024-10-03

## 2024-10-03 ASSESSMENT — ENCOUNTER SYMPTOMS
ALLERGIC/IMMUNOLOGIC NEGATIVE: 1
VOMITING: 0
EYE PAIN: 0
NAUSEA: 0
BACK PAIN: 0
TROUBLE SWALLOWING: 0
RHINORRHEA: 0
EYE DISCHARGE: 0
COUGH: 0
SHORTNESS OF BREATH: 0
CONSTIPATION: 0
DIARRHEA: 0
WHEEZING: 0
EYE REDNESS: 0
SORE THROAT: 0
ABDOMINAL PAIN: 0

## 2024-10-03 NOTE — PROGRESS NOTES
[facility] PROFESSIONAL SERVS  [facility]  [address] [id number]  Dept: [phone]  Dept Fax: [phone]  Loc: [phone]     Visit Date:  10/3/2024      Patient:  Dakota Yan  YOB: 1960    HPI:     Chief Complaint   Patient presents with    Dizziness    Headache     Patient seen 9/27 for vertigo, feels slight improvement but still very dizzy and getting headaches as well. 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 Does report a stiff neck        Chief Complaint  Patient presents with  · Dizziness  · Headache    Patient seen 9/27 for vertigo, feels slight improvement but still very dizzy and getting headaches as well. 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 Does report a stiff neck.  Patient has a history of this and sometimes smokes relaxers will help relax her neck.        Medications    Current Outpatient Medications:     cyclobenzaprine (FLEXERIL) 10 MG tablet, Take 1 tablet by mouth 3 times daily as needed for Muscle spasms, Disp: 15 tablet, Rfl: 0    meclizine (ANTIVERT) 12.5 MG tablet, Take 1 tablet by mouth 3 times daily as needed for Dizziness, Disp: 60 tablet, Rfl: 0    pseudoephedrine (DECONGESTANT) 30 MG tablet, Take 1 tablet by mouth every 6 hours as needed for Congestion, Disp: 20 tablet, Rfl: 0    azithromycin (ZITHROMAX Z-CHINA) 250 MG tablet, 2 pills orally for 1 day, then 1 pill orally for 4 days, Disp: 6 tablet, Rfl: 0    lovastatin (MEVACOR) 20 MG tablet, Take 1 tablet by mouth nightly, Disp: 90 tablet, Rfl: 2    Multiple Vitamins-Minerals (MULTIVITAMIN ADULT EXTRA C PO), Take 1 tablet by mouth Every Day, Disp: , Rfl:     VITAMIN D PO, Take by mouth, Disp: , Rfl:     GARLIC PO, Take 1

## 2025-06-02 DIAGNOSIS — E78.00 HYPERCHOLESTEROLEMIA: ICD-10-CM

## 2025-06-03 RX ORDER — LOVASTATIN 20 MG/1
TABLET ORAL
Qty: 90 TABLET | Refills: 2 | Status: SHIPPED | OUTPATIENT
Start: 2025-06-03

## (undated) DEVICE — TUBING IV STOPCOCK 48 CM 3 W

## (undated) DEVICE — LINER SUCT CANSTR 1500CC SEMI RIG W/ POR HYDROPHOBIC SHUT

## (undated) DEVICE — SET LNR RED GRN W/ BASE CLEANASCOPE

## (undated) DEVICE — ENDO KIT: Brand: MEDLINE INDUSTRIES, INC.

## (undated) DEVICE — IV START KIT: Brand: MEDLINE INDUSTRIES, INC.

## (undated) DEVICE — CONNECTOR TBNG AUX H2O JET DISP FOR OLY 160/180 SER

## (undated) DEVICE — CATHETER ETER IV 22GA L1IN POLYUR STR RADPQ INTROCAN SFTY

## (undated) DEVICE — SOLUTION IV 1000ML 0.45% SOD CHL PH 5 INJ USP VIAFLX PLAS

## (undated) DEVICE — SET ADMIN 25ML L117IN PMP MOD CK VLV RLER CLMP 2 SMRTSITE